# Patient Record
Sex: FEMALE | Race: WHITE | Employment: OTHER | URBAN - METROPOLITAN AREA
[De-identification: names, ages, dates, MRNs, and addresses within clinical notes are randomized per-mention and may not be internally consistent; named-entity substitution may affect disease eponyms.]

---

## 2022-12-30 ENCOUNTER — PROCEDURE VISIT (OUTPATIENT)
Dept: FAMILY MEDICINE CLINIC | Age: 67
End: 2022-12-30

## 2022-12-30 VITALS
HEART RATE: 94 BPM | WEIGHT: 194.6 LBS | BODY MASS INDEX: 36.74 KG/M2 | DIASTOLIC BLOOD PRESSURE: 82 MMHG | SYSTOLIC BLOOD PRESSURE: 134 MMHG | HEIGHT: 61 IN | OXYGEN SATURATION: 91 % | RESPIRATION RATE: 16 BRPM

## 2022-12-30 DIAGNOSIS — L98.9 SKIN LESION: Primary | ICD-10-CM

## 2022-12-30 RX ORDER — OMEPRAZOLE 40 MG/1
CAPSULE, DELAYED RELEASE ORAL
COMMUNITY

## 2022-12-30 RX ORDER — LOSARTAN POTASSIUM AND HYDROCHLOROTHIAZIDE 12.5; 1 MG/1; MG/1
TABLET ORAL
COMMUNITY

## 2022-12-30 RX ORDER — ATORVASTATIN CALCIUM 40 MG/1
TABLET, FILM COATED ORAL
COMMUNITY

## 2022-12-30 RX ORDER — LEVOTHYROXINE AND LIOTHYRONINE 38; 9 UG/1; UG/1
60 TABLET ORAL DAILY
COMMUNITY

## 2022-12-30 RX ORDER — LEVOTHYROXINE AND LIOTHYRONINE 19; 4.5 UG/1; UG/1
TABLET ORAL
COMMUNITY
Start: 2017-07-26

## 2022-12-30 ASSESSMENT — ENCOUNTER SYMPTOMS
RECTAL PAIN: 0
COUGH: 0
COLOR CHANGE: 1
ABDOMINAL PAIN: 1
ABDOMINAL DISTENTION: 0
VOMITING: 0
FACIAL SWELLING: 0

## 2022-12-30 ASSESSMENT — PATIENT HEALTH QUESTIONNAIRE - PHQ9
SUM OF ALL RESPONSES TO PHQ9 QUESTIONS 1 & 2: 0
6. FEELING BAD ABOUT YOURSELF - OR THAT YOU ARE A FAILURE OR HAVE LET YOURSELF OR YOUR FAMILY DOWN: 0
7. TROUBLE CONCENTRATING ON THINGS, SUCH AS READING THE NEWSPAPER OR WATCHING TELEVISION: 0
SUM OF ALL RESPONSES TO PHQ QUESTIONS 1-9: 0
2. FEELING DOWN, DEPRESSED OR HOPELESS: 0
10. IF YOU CHECKED OFF ANY PROBLEMS, HOW DIFFICULT HAVE THESE PROBLEMS MADE IT FOR YOU TO DO YOUR WORK, TAKE CARE OF THINGS AT HOME, OR GET ALONG WITH OTHER PEOPLE: 0
SUM OF ALL RESPONSES TO PHQ QUESTIONS 1-9: 0
1. LITTLE INTEREST OR PLEASURE IN DOING THINGS: 0
3. TROUBLE FALLING OR STAYING ASLEEP: 0
4. FEELING TIRED OR HAVING LITTLE ENERGY: 0
SUM OF ALL RESPONSES TO PHQ QUESTIONS 1-9: 0
8. MOVING OR SPEAKING SO SLOWLY THAT OTHER PEOPLE COULD HAVE NOTICED. OR THE OPPOSITE, BEING SO FIGETY OR RESTLESS THAT YOU HAVE BEEN MOVING AROUND A LOT MORE THAN USUAL: 0
9. THOUGHTS THAT YOU WOULD BE BETTER OFF DEAD, OR OF HURTING YOURSELF: 0
5. POOR APPETITE OR OVEREATING: 0
SUM OF ALL RESPONSES TO PHQ QUESTIONS 1-9: 0

## 2022-12-30 NOTE — PROGRESS NOTES
12/30/2022    This is a 79 y.o. female who presents for  Chief Complaint   Patient presents with    Procedure     Pt is here today for a skin biopsy        HPI:     Patient is here presents to the office as a new patient for an abnormal skin lesion. She states that she noticed a skin lesion on her top of her head that is changed in shape and color over the last few weeks. No past medical history on file. No past surgical history on file. Social History     Socioeconomic History    Marital status:      Spouse name: Not on file    Number of children: Not on file    Years of education: Not on file    Highest education level: Not on file   Occupational History    Not on file   Tobacco Use    Smoking status: Never    Smokeless tobacco: Never   Vaping Use    Vaping Use: Never used   Substance and Sexual Activity    Alcohol use: Not Currently    Drug use: Never    Sexual activity: Not on file   Other Topics Concern    Not on file   Social History Narrative    Not on file     Social Determinants of Health     Financial Resource Strain: Not on file   Food Insecurity: Not on file   Transportation Needs: Not on file   Physical Activity: Not on file   Stress: Not on file   Social Connections: Not on file   Intimate Partner Violence: Not on file   Housing Stability: Not on file       No family history on file.     Current Outpatient Medications   Medication Sig Dispense Refill    atorvastatin (LIPITOR) 40 MG tablet atorvastatin 40 mg tablet      thyroid (ARMOUR) 30 MG tablet Take one tablet on Monday, Weednesday, and Friday      losartan-hydroCHLOROthiazide (HYZAAR) 100-12.5 MG per tablet losartan 100 mg-hydrochlorothiazide 12.5 mg tablet      omeprazole (PRILOSEC) 40 MG delayed release capsule omeprazole 40 mg capsule,delayed release      thyroid (ARMOUR) 60 MG tablet Take 60 mg by mouth daily 1 tablet on Sunday, Tuesday, Thursday and Saturday      metoprolol tartrate (LOPRESSOR) 25 MG tablet Take 25 mg by mouth 2 times daily       No current facility-administered medications for this visit. There is no immunization history on file for this patient. Allergies   Allergen Reactions    Hydrocodone     Propoxyphene     Penicillin G Rash       Review of Systems   Constitutional:  Negative for activity change, appetite change, chills, diaphoresis, fatigue and fever. HENT:  Negative for facial swelling. Respiratory:  Negative for cough. Cardiovascular:  Negative for chest pain and leg swelling. Gastrointestinal:  Positive for abdominal pain. Negative for abdominal distention, rectal pain and vomiting. Endocrine: Negative for cold intolerance, heat intolerance, polydipsia, polyphagia and polyuria. Genitourinary:  Negative for difficulty urinating and dyspareunia. Skin:  Positive for color change. Allergic/Immunologic: Negative for environmental allergies and food allergies. Neurological:  Negative for dizziness, facial asymmetry and light-headedness. Psychiatric/Behavioral:  Negative for behavioral problems and confusion. /82 (Site: Right Upper Arm, Position: Sitting)   Pulse 94   Resp 16   Ht 5' 1\" (1.549 m)   Wt 194 lb 9.6 oz (88.3 kg)   SpO2 91%   BMI 36.77 kg/m²     Physical Exam  Constitutional:       General: She is not in acute distress. Appearance: Normal appearance. HENT:      Right Ear: Tympanic membrane normal.      Left Ear: Tympanic membrane normal.   Eyes:      Pupils: Pupils are equal, round, and reactive to light. Cardiovascular:      Rate and Rhythm: Normal rate and regular rhythm. Pulmonary:      Effort: Pulmonary effort is normal.      Breath sounds: Normal breath sounds. Musculoskeletal:         General: Normal range of motion. Skin:     General: Skin is warm. Comments: 2 cm x 2 cm area round somewhat irregular lesion patient reports that is changed in shape and color. Neurological:      General: No focal deficit present. Mental Status: She is alert. Psychiatric:         Mood and Affect: Mood normal.         Behavior: Behavior normal.         Judgment: Judgment normal.       Shave Biopsy Procedure Note    Pre-operative Diagnosis: Actinic keratosis    Post-operative Diagnosis: same    Locations:middle  top of the scalp    Indications: growth, color change, inflammation, irritation, bleeding    Anesthesia: Lidocaine 1% without epinephrine     Procedure Details   History of allergy to iodine: no  History of allergy to lidocaine: no    Patient informed of the risks (including bleeding and infection) and benefits of the   procedure and Verbal informed consent obtained. The lesion and surrounding area were given a sterile prep using alcohol and draped in the usual sterile fashion. A razor was used to shave an area of skin approximately 1cm by 1cm. Hemostasis achieved with pressure . Antibiotic ointment and a sterile dressing applied. The specimen was sent for pathologic examination. The patient tolerated the procedure well. Findings:  Per above    Condition:  Stable    Complications:  none. Plan:  1. Instructed to keep the wound dry and covered for 24-48h and clean thereafter. 2. Warning signs of infection were reviewed. 3. Recommended that the patient use OTC analgesics as needed for pain. Wound care discussed in detail. Keep area dry for 24-48 hours. Cleanse wound BID with warm water and unfragranced soap. Apply topical triple antibiotics and clean dressing with every wound cleaning. Can leave open to the air once area is covered with a scab. Can apply Vaseline nightly to assist with the healing process. While assessing care for this patient, I have reviewed all pertinent lab work/imaging/ specialist notes and care in reference to those problems addressed above in detail. Appropriate medical decision making was based on this.      Please note that portions of this note may have been completed with a voice recognition program. Efforts were made to edit the dictations but occasionally words are mis-transcribed. No follow-ups on file.

## 2023-02-13 ENCOUNTER — HOSPITAL ENCOUNTER (INPATIENT)
Age: 68
LOS: 9 days | Discharge: HOME HEALTH | DRG: 987 | End: 2023-02-22
Attending: EMERGENCY MEDICINE | Admitting: INTERNAL MEDICINE
Payer: COMMERCIAL

## 2023-02-13 ENCOUNTER — OFFICE VISIT (OUTPATIENT)
Dept: FAMILY MEDICINE CLINIC | Age: 68
End: 2023-02-13

## 2023-02-13 ENCOUNTER — APPOINTMENT (OUTPATIENT)
Dept: GENERAL RADIOLOGY | Age: 68
DRG: 987 | End: 2023-02-13
Payer: COMMERCIAL

## 2023-02-13 ENCOUNTER — APPOINTMENT (OUTPATIENT)
Dept: VASCULAR LAB | Age: 68
DRG: 987 | End: 2023-02-13
Payer: COMMERCIAL

## 2023-02-13 ENCOUNTER — APPOINTMENT (OUTPATIENT)
Dept: CT IMAGING | Age: 68
DRG: 987 | End: 2023-02-13
Payer: COMMERCIAL

## 2023-02-13 VITALS
BODY MASS INDEX: 36.21 KG/M2 | SYSTOLIC BLOOD PRESSURE: 100 MMHG | DIASTOLIC BLOOD PRESSURE: 70 MMHG | HEART RATE: 115 BPM | HEIGHT: 61 IN | RESPIRATION RATE: 16 BRPM | OXYGEN SATURATION: 88 % | WEIGHT: 191.8 LBS

## 2023-02-13 DIAGNOSIS — R79.89 ELEVATED BRAIN NATRIURETIC PEPTIDE (BNP) LEVEL: ICD-10-CM

## 2023-02-13 DIAGNOSIS — I31.39 PERICARDIAL EFFUSION: ICD-10-CM

## 2023-02-13 DIAGNOSIS — I26.99 BILATERAL PULMONARY EMBOLISM (HCC): Primary | ICD-10-CM

## 2023-02-13 DIAGNOSIS — R91.8 LUNG MASS: ICD-10-CM

## 2023-02-13 DIAGNOSIS — R06.02 SHORTNESS OF BREATH: ICD-10-CM

## 2023-02-13 DIAGNOSIS — K76.9 LIVER LESION: ICD-10-CM

## 2023-02-13 DIAGNOSIS — R09.02 HYPOXIA: ICD-10-CM

## 2023-02-13 DIAGNOSIS — J96.01 ACUTE RESPIRATORY FAILURE WITH HYPOXIA (HCC): Primary | ICD-10-CM

## 2023-02-13 DIAGNOSIS — U07.1 COVID: ICD-10-CM

## 2023-02-13 DIAGNOSIS — J90 PLEURAL EFFUSION: ICD-10-CM

## 2023-02-13 DIAGNOSIS — I82.4Y2 ACUTE DEEP VEIN THROMBOSIS (DVT) OF PROXIMAL VEIN OF LEFT LOWER EXTREMITY (HCC): ICD-10-CM

## 2023-02-13 DIAGNOSIS — R77.8 ELEVATED TROPONIN: ICD-10-CM

## 2023-02-13 PROBLEM — J96.00 ACUTE RESPIRATORY FAILURE (HCC): Status: ACTIVE | Noted: 2023-02-13

## 2023-02-13 LAB
A/G RATIO: 1.3 (ref 1.1–2.2)
ALBUMIN SERPL-MCNC: 4 G/DL (ref 3.4–5)
ALP BLD-CCNC: 351 U/L (ref 40–129)
ALT SERPL-CCNC: 21 U/L (ref 10–40)
ANION GAP SERPL CALCULATED.3IONS-SCNC: 16 MMOL/L (ref 3–16)
ANTI-XA UNFRAC HEPARIN: 0.5 IU/ML (ref 0.3–0.7)
APTT: 26.5 SEC (ref 23–34.3)
AST SERPL-CCNC: 34 U/L (ref 15–37)
BASE EXCESS VENOUS: 1.2 MMOL/L (ref -3–3)
BASOPHILS ABSOLUTE: 0 K/UL (ref 0–0.2)
BASOPHILS RELATIVE PERCENT: 0.2 %
BILIRUB SERPL-MCNC: 0.9 MG/DL (ref 0–1)
BUN BLDV-MCNC: 22 MG/DL (ref 7–20)
CALCIUM SERPL-MCNC: 10.4 MG/DL (ref 8.3–10.6)
CARBOXYHEMOGLOBIN: 1.5 % (ref 0–1.5)
CHLORIDE BLD-SCNC: 101 MMOL/L (ref 99–110)
CO2: 24 MMOL/L (ref 21–32)
CREAT SERPL-MCNC: 0.8 MG/DL (ref 0.6–1.2)
EKG ATRIAL RATE: 106 BPM
EKG DIAGNOSIS: NORMAL
EKG P AXIS: 61 DEGREES
EKG P-R INTERVAL: 146 MS
EKG Q-T INTERVAL: 344 MS
EKG QRS DURATION: 86 MS
EKG QTC CALCULATION (BAZETT): 456 MS
EKG R AXIS: 55 DEGREES
EKG T AXIS: 58 DEGREES
EKG VENTRICULAR RATE: 106 BPM
EOSINOPHILS ABSOLUTE: 0 K/UL (ref 0–0.6)
EOSINOPHILS RELATIVE PERCENT: 0.1 %
GFR SERPL CREATININE-BSD FRML MDRD: >60 ML/MIN/{1.73_M2}
GLUCOSE BLD-MCNC: 131 MG/DL (ref 70–99)
HCO3 VENOUS: 25.1 MMOL/L (ref 23–29)
HCT VFR BLD CALC: 39.7 % (ref 36–48)
HEMOGLOBIN: 12.9 G/DL (ref 12–16)
INFLUENZA A: NOT DETECTED
INFLUENZA B: NOT DETECTED
LACTIC ACID: 3.7 MMOL/L (ref 0.4–2)
LYMPHOCYTES ABSOLUTE: 0.8 K/UL (ref 1–5.1)
LYMPHOCYTES RELATIVE PERCENT: 4.7 %
MCH RBC QN AUTO: 27.5 PG (ref 26–34)
MCHC RBC AUTO-ENTMCNC: 32.4 G/DL (ref 31–36)
MCV RBC AUTO: 84.6 FL (ref 80–100)
METHEMOGLOBIN VENOUS: 0.4 %
MONOCYTES ABSOLUTE: 0.6 K/UL (ref 0–1.3)
MONOCYTES RELATIVE PERCENT: 3.2 %
NEUTROPHILS ABSOLUTE: 16.4 K/UL (ref 1.7–7.7)
NEUTROPHILS RELATIVE PERCENT: 91.8 %
O2 SAT, VEN: 76 %
O2 THERAPY: ABNORMAL
PCO2, VEN: 37.7 MMHG (ref 40–50)
PDW BLD-RTO: 17.3 % (ref 12.4–15.4)
PH VENOUS: 7.44 (ref 7.35–7.45)
PLATELET # BLD: 133 K/UL (ref 135–450)
PMV BLD AUTO: 8.9 FL (ref 5–10.5)
PO2, VEN: 41.3 MMHG (ref 25–40)
POTASSIUM SERPL-SCNC: 3.3 MMOL/L (ref 3.5–5.1)
PRO-BNP: 2645 PG/ML (ref 0–124)
RBC # BLD: 4.69 M/UL (ref 4–5.2)
SARS-COV-2 RNA, RT PCR: DETECTED
SODIUM BLD-SCNC: 141 MMOL/L (ref 136–145)
SPECIMEN STATUS: NORMAL
SPECIMEN STATUS: NORMAL
TCO2 CALC VENOUS: 26 MMOL/L
TOTAL PROTEIN: 7.2 G/DL (ref 6.4–8.2)
TROPONIN: 0.05 NG/ML
WBC # BLD: 17.8 K/UL (ref 4–11)

## 2023-02-13 PROCEDURE — 2580000003 HC RX 258: Performed by: INTERNAL MEDICINE

## 2023-02-13 PROCEDURE — 6360000004 HC RX CONTRAST MEDICATION: Performed by: PHYSICIAN ASSISTANT

## 2023-02-13 PROCEDURE — 71260 CT THORAX DX C+: CPT | Performed by: PHYSICIAN ASSISTANT

## 2023-02-13 PROCEDURE — 96374 THER/PROPH/DIAG INJ IV PUSH: CPT

## 2023-02-13 PROCEDURE — 93971 EXTREMITY STUDY: CPT

## 2023-02-13 PROCEDURE — 80053 COMPREHEN METABOLIC PANEL: CPT

## 2023-02-13 PROCEDURE — 2580000003 HC RX 258: Performed by: PHYSICIAN ASSISTANT

## 2023-02-13 PROCEDURE — 2500000003 HC RX 250 WO HCPCS: Performed by: PHYSICIAN ASSISTANT

## 2023-02-13 PROCEDURE — 71045 X-RAY EXAM CHEST 1 VIEW: CPT

## 2023-02-13 PROCEDURE — 83605 ASSAY OF LACTIC ACID: CPT

## 2023-02-13 PROCEDURE — 82803 BLOOD GASES ANY COMBINATION: CPT

## 2023-02-13 PROCEDURE — 6360000002 HC RX W HCPCS: Performed by: PHYSICIAN ASSISTANT

## 2023-02-13 PROCEDURE — 94761 N-INVAS EAR/PLS OXIMETRY MLT: CPT

## 2023-02-13 PROCEDURE — 84484 ASSAY OF TROPONIN QUANT: CPT

## 2023-02-13 PROCEDURE — 87040 BLOOD CULTURE FOR BACTERIA: CPT

## 2023-02-13 PROCEDURE — 85730 THROMBOPLASTIN TIME PARTIAL: CPT

## 2023-02-13 PROCEDURE — 1200000000 HC SEMI PRIVATE

## 2023-02-13 PROCEDURE — 6370000000 HC RX 637 (ALT 250 FOR IP): Performed by: PHYSICIAN ASSISTANT

## 2023-02-13 PROCEDURE — 85025 COMPLETE CBC W/AUTO DIFF WBC: CPT

## 2023-02-13 PROCEDURE — 85520 HEPARIN ASSAY: CPT

## 2023-02-13 PROCEDURE — 99285 EMERGENCY DEPT VISIT HI MDM: CPT

## 2023-02-13 PROCEDURE — 93010 ELECTROCARDIOGRAM REPORT: CPT | Performed by: INTERNAL MEDICINE

## 2023-02-13 PROCEDURE — 6370000000 HC RX 637 (ALT 250 FOR IP): Performed by: INTERNAL MEDICINE

## 2023-02-13 PROCEDURE — 83880 ASSAY OF NATRIURETIC PEPTIDE: CPT

## 2023-02-13 PROCEDURE — 87636 SARSCOV2 & INF A&B AMP PRB: CPT

## 2023-02-13 PROCEDURE — 2700000000 HC OXYGEN THERAPY PER DAY

## 2023-02-13 PROCEDURE — 93005 ELECTROCARDIOGRAM TRACING: CPT | Performed by: PHYSICIAN ASSISTANT

## 2023-02-13 RX ORDER — PANTOPRAZOLE SODIUM 40 MG/1
40 TABLET, DELAYED RELEASE ORAL EVERY MORNING
Status: DISCONTINUED | OUTPATIENT
Start: 2023-02-14 | End: 2023-02-14

## 2023-02-13 RX ORDER — ASPIRIN 81 MG/1
324 TABLET, CHEWABLE ORAL ONCE
Status: COMPLETED | OUTPATIENT
Start: 2023-02-13 | End: 2023-02-13

## 2023-02-13 RX ORDER — MELATONIN 5 MG
5 TABLET,CHEWABLE ORAL NIGHTLY
COMMUNITY

## 2023-02-13 RX ORDER — SODIUM CHLORIDE 9 MG/ML
INJECTION, SOLUTION INTRAVENOUS PRN
Status: DISCONTINUED | OUTPATIENT
Start: 2023-02-13 | End: 2023-02-22 | Stop reason: HOSPADM

## 2023-02-13 RX ORDER — SODIUM CHLORIDE 0.9 % (FLUSH) 0.9 %
5-40 SYRINGE (ML) INJECTION EVERY 12 HOURS SCHEDULED
Status: DISCONTINUED | OUTPATIENT
Start: 2023-02-13 | End: 2023-02-22 | Stop reason: HOSPADM

## 2023-02-13 RX ORDER — IPRATROPIUM BROMIDE AND ALBUTEROL SULFATE 2.5; .5 MG/3ML; MG/3ML
1 SOLUTION RESPIRATORY (INHALATION) EVERY 4 HOURS PRN
Status: DISCONTINUED | OUTPATIENT
Start: 2023-02-13 | End: 2023-02-22 | Stop reason: HOSPADM

## 2023-02-13 RX ORDER — ATORVASTATIN CALCIUM 40 MG/1
40 TABLET, FILM COATED ORAL NIGHTLY
Status: DISCONTINUED | OUTPATIENT
Start: 2023-02-13 | End: 2023-02-22 | Stop reason: HOSPADM

## 2023-02-13 RX ORDER — SODIUM CHLORIDE 9 MG/ML
1000 INJECTION, SOLUTION INTRAVENOUS CONTINUOUS
Status: DISCONTINUED | OUTPATIENT
Start: 2023-02-13 | End: 2023-02-22

## 2023-02-13 RX ORDER — ACETAMINOPHEN 650 MG/1
650 SUPPOSITORY RECTAL EVERY 6 HOURS PRN
Status: DISCONTINUED | OUTPATIENT
Start: 2023-02-13 | End: 2023-02-22 | Stop reason: HOSPADM

## 2023-02-13 RX ORDER — ACETAMINOPHEN 325 MG/1
650 TABLET ORAL EVERY 6 HOURS PRN
Status: DISCONTINUED | OUTPATIENT
Start: 2023-02-13 | End: 2023-02-22 | Stop reason: HOSPADM

## 2023-02-13 RX ORDER — POLYETHYLENE GLYCOL 3350 17 G/17G
17 POWDER, FOR SOLUTION ORAL DAILY PRN
Status: DISCONTINUED | OUTPATIENT
Start: 2023-02-13 | End: 2023-02-22 | Stop reason: HOSPADM

## 2023-02-13 RX ORDER — ONDANSETRON 2 MG/ML
4 INJECTION INTRAMUSCULAR; INTRAVENOUS EVERY 6 HOURS PRN
Status: DISCONTINUED | OUTPATIENT
Start: 2023-02-13 | End: 2023-02-22 | Stop reason: HOSPADM

## 2023-02-13 RX ORDER — LEVOTHYROXINE AND LIOTHYRONINE 19; 4.5 UG/1; UG/1
30 TABLET ORAL
Status: DISCONTINUED | OUTPATIENT
Start: 2023-02-15 | End: 2023-02-22 | Stop reason: HOSPADM

## 2023-02-13 RX ORDER — LOSARTAN POTASSIUM AND HYDROCHLOROTHIAZIDE 12.5; 1 MG/1; MG/1
0.5 TABLET ORAL 2 TIMES DAILY
Status: CANCELLED | OUTPATIENT
Start: 2023-02-14

## 2023-02-13 RX ORDER — LEVOTHYROXINE AND LIOTHYRONINE 19; 4.5 UG/1; UG/1
60 TABLET ORAL
Status: DISCONTINUED | OUTPATIENT
Start: 2023-02-14 | End: 2023-02-22 | Stop reason: HOSPADM

## 2023-02-13 RX ORDER — HEPARIN SODIUM 10000 [USP'U]/100ML
1380 INJECTION, SOLUTION INTRAVENOUS CONTINUOUS
Status: DISCONTINUED | OUTPATIENT
Start: 2023-02-13 | End: 2023-02-22

## 2023-02-13 RX ORDER — SODIUM CHLORIDE 0.9 % (FLUSH) 0.9 %
5-40 SYRINGE (ML) INJECTION PRN
Status: DISCONTINUED | OUTPATIENT
Start: 2023-02-13 | End: 2023-02-22 | Stop reason: HOSPADM

## 2023-02-13 RX ORDER — MAGNESIUM L-LACTATE 84 MG
TABLET, EXTENDED RELEASE ORAL NIGHTLY
COMMUNITY

## 2023-02-13 RX ORDER — HEPARIN SODIUM 1000 [USP'U]/ML
2500 INJECTION, SOLUTION INTRAVENOUS; SUBCUTANEOUS PRN
Status: DISCONTINUED | OUTPATIENT
Start: 2023-02-13 | End: 2023-02-22 | Stop reason: HOSPADM

## 2023-02-13 RX ORDER — HEPARIN SODIUM 1000 [USP'U]/ML
5000 INJECTION, SOLUTION INTRAVENOUS; SUBCUTANEOUS ONCE
Status: COMPLETED | OUTPATIENT
Start: 2023-02-13 | End: 2023-02-13

## 2023-02-13 RX ORDER — HEPARIN SODIUM 1000 [USP'U]/ML
5000 INJECTION, SOLUTION INTRAVENOUS; SUBCUTANEOUS PRN
Status: DISCONTINUED | OUTPATIENT
Start: 2023-02-13 | End: 2023-02-22 | Stop reason: HOSPADM

## 2023-02-13 RX ORDER — BIOTIN 1 MG
10000 TABLET ORAL NIGHTLY
COMMUNITY

## 2023-02-13 RX ORDER — ONDANSETRON 4 MG/1
4 TABLET, ORALLY DISINTEGRATING ORAL EVERY 8 HOURS PRN
Status: DISCONTINUED | OUTPATIENT
Start: 2023-02-13 | End: 2023-02-22 | Stop reason: HOSPADM

## 2023-02-13 RX ADMIN — HEPARIN SODIUM 5000 UNITS: 1000 INJECTION INTRAVENOUS; SUBCUTANEOUS at 13:03

## 2023-02-13 RX ADMIN — HEPARIN SODIUM 1130 UNITS/HR: 10000 INJECTION, SOLUTION INTRAVENOUS at 13:03

## 2023-02-13 RX ADMIN — SODIUM CHLORIDE, PRESERVATIVE FREE 10 ML: 5 INJECTION INTRAVENOUS at 22:18

## 2023-02-13 RX ADMIN — ATORVASTATIN CALCIUM 40 MG: 40 TABLET, FILM COATED ORAL at 22:18

## 2023-02-13 RX ADMIN — CEFTRIAXONE SODIUM 1000 MG: 1 INJECTION, POWDER, FOR SOLUTION INTRAMUSCULAR; INTRAVENOUS at 15:08

## 2023-02-13 RX ADMIN — ASPIRIN 81 MG 324 MG: 81 TABLET ORAL at 13:04

## 2023-02-13 RX ADMIN — AZITHROMYCIN MONOHYDRATE 500 MG: 500 INJECTION, POWDER, LYOPHILIZED, FOR SOLUTION INTRAVENOUS at 15:48

## 2023-02-13 RX ADMIN — SODIUM CHLORIDE 1000 ML: 9 INJECTION, SOLUTION INTRAVENOUS at 15:06

## 2023-02-13 RX ADMIN — IOPAMIDOL 75 ML: 755 INJECTION, SOLUTION INTRAVENOUS at 12:40

## 2023-02-13 ASSESSMENT — PATIENT HEALTH QUESTIONNAIRE - PHQ9
8. MOVING OR SPEAKING SO SLOWLY THAT OTHER PEOPLE COULD HAVE NOTICED. OR THE OPPOSITE, BEING SO FIGETY OR RESTLESS THAT YOU HAVE BEEN MOVING AROUND A LOT MORE THAN USUAL: 1
5. POOR APPETITE OR OVEREATING: 0
7. TROUBLE CONCENTRATING ON THINGS, SUCH AS READING THE NEWSPAPER OR WATCHING TELEVISION: 1
3. TROUBLE FALLING OR STAYING ASLEEP: 1
SUM OF ALL RESPONSES TO PHQ QUESTIONS 1-9: 10
SUM OF ALL RESPONSES TO PHQ QUESTIONS 1-9: 10
1. LITTLE INTEREST OR PLEASURE IN DOING THINGS: 2
4. FEELING TIRED OR HAVING LITTLE ENERGY: 3
6. FEELING BAD ABOUT YOURSELF - OR THAT YOU ARE A FAILURE OR HAVE LET YOURSELF OR YOUR FAMILY DOWN: 0
9. THOUGHTS THAT YOU WOULD BE BETTER OFF DEAD, OR OF HURTING YOURSELF: 0
SUM OF ALL RESPONSES TO PHQ QUESTIONS 1-9: 10
2. FEELING DOWN, DEPRESSED OR HOPELESS: 2
SUM OF ALL RESPONSES TO PHQ QUESTIONS 1-9: 10
10. IF YOU CHECKED OFF ANY PROBLEMS, HOW DIFFICULT HAVE THESE PROBLEMS MADE IT FOR YOU TO DO YOUR WORK, TAKE CARE OF THINGS AT HOME, OR GET ALONG WITH OTHER PEOPLE: 1
SUM OF ALL RESPONSES TO PHQ9 QUESTIONS 1 & 2: 4

## 2023-02-13 ASSESSMENT — COLUMBIA-SUICIDE SEVERITY RATING SCALE - C-SSRS
2. HAVE YOU ACTUALLY HAD ANY THOUGHTS OF KILLING YOURSELF?: NO
1. WITHIN THE PAST MONTH, HAVE YOU WISHED YOU WERE DEAD OR WISHED YOU COULD GO TO SLEEP AND NOT WAKE UP?: NO
6. HAVE YOU EVER DONE ANYTHING, STARTED TO DO ANYTHING, OR PREPARED TO DO ANYTHING TO END YOUR LIFE?: NO

## 2023-02-13 ASSESSMENT — ENCOUNTER SYMPTOMS
SHORTNESS OF BREATH: 1
COLOR CHANGE: 0
BLOOD IN STOOL: 0
WHEEZING: 0
DIARRHEA: 0
SORE THROAT: 0
COUGH: 1
CONSTIPATION: 0
ABDOMINAL PAIN: 0

## 2023-02-13 ASSESSMENT — PAIN - FUNCTIONAL ASSESSMENT: PAIN_FUNCTIONAL_ASSESSMENT: NONE - DENIES PAIN

## 2023-02-13 NOTE — CONSULTS
Pharmacy to Manage Heparin Infusion per Hospital Policy    Dx: (VTE/DVT/PE)  Pt wt = 63 kg (adjusted)  Baseline aPTT = pending    Heparin (weight-based) Infusion: VTE/DVT/PE  Heparin 80 units/kg IVP bolus (max 10,000 units) followed by Heparin infusion at 18 units/kg/hr (recommended max initial rate: 2100 units/hr). Recheck anti-Xa (unless aPTT being used) in 6 hours. Goal anti-Xa 0.3-0.7 IU/mL  Goal aPTT =  seconds. Pharmacy to manage Heparin - contact for questions. Heparin 80 units/kg IV x 1 (max 10,000 units), then 18 units/kg/hr (recommended max  initial rate 2100 units/hr). Adjust infusion rate based off anti-Xa results below. anti-Xa < 0.1  Heparin 80 units/kg bolus  Increase infusion by 4 units/kg/hr  anti-Xa 0.1-0.29    Heparin 40 units/kg bolus Increase infusion by 2 units/kg/hr  anti-Xa 0.3-0.7  No bolus No change   anti-Xa 0.71-0.8    No bolus Decrease infusion by 1 units/kg/hr  anti-Xa 0.81-0.99   No bolus Decrease infusion by 2 units/kg/hr  anti-Xa 1 or more  Hold heparin for 1 hour Decrease infusion by 3 units/kg/hr    Obtain anti-Xa 6 hours after bolus and 6 hours after any dose change until two consecutive therapeutic anti-Xa are achieved- then daily.     Alanna Coreas, PharmD 2/13/2023 12:36 PM      2/13 @ 1933  Anti-Xa - 0.5  Continue heparin infusion at 1130 units/hr  Next anti-xa in 6 hours  Jinny Manzo PharmD 2/13/2023 8:15 PM     ----------------------------------------------------  2/14 0227  High-Dose Heparin Drip  Current Rate: 1130 units/hr  2/14 @ 0136 Anti-Xa Level= 0.40  Plan: Per pharmacy dosing, we will not make any changes at this time and move to daily Anti-Xa level monitoring    Next Anti-Xa Level: 2/15 @ 0600  Beronica Barbosa PharmD 2/14/2023 2:27 AM    ----------------------------------------------------------  2/15 0657  High-Dose Heparin Drip  Current Rate: 1130 units/hr  2/15 @ 0601 Anti-Xa Level= 0.38  Plan: Per pharmacy dosing, we will not make any changes at this time    Next Anti-Xa Level: 2/16 @ 0600  Claude Bowling, PharmD 2/15/2023 6:57 AM    2/16/2023 at 9891  Anti-Xa Unfrac Heparin   0.48   IU/mL  - No change to Heparin at this time, continue Heparin infusion at _1130_ units/hr. - Recheck Anti-Xa daily, next lab 2/17. Javid Awad, PharmD    2/16/2023 8:53 AM      2/17/2023 at 0600  Anti-Xa Unfrac Heparin   0.50   IU/mL  - No change to Heparin at this time, continue Heparin infusion at _1130_ units/hr. - Recheck Anti-Xa daily, next lab 2/18. Javid Awad PharmD    2/17/2023 9:43 AM    2/18 0628  Anti-Xa - 0.55   Continue heparin infusion at 1130 units/hr  Next anti-Xa 2/19  Willard Mejia PharmD 2/18/2023 11:13 AM      2/19 @ 0629  Anti-Xa 0.47  No change to Heparin at this time, continue Heparin infusion at 1130 units/hr. Recheck anti-Xa tomorrow AM    Mary Ellen Diehl PharmD 2/19/2023 8:46 AM    ----------------------------------------------------    2/20/2023  anti-Xa level = < 0.10 IU/mL at 0644  Heparin was stopped at 0300 for liver biopsy  Requested RN to contact Rx when Heparin restarted  Will restart @ same rate of 1130 units/hr & anti-Xa 6 hours from restarting  Tisha Barnes PharmD  2/20/2023 7:57 AM      -------------------------------------------------------    2/20/2023  Heparin restarting now   Restart Heparin infusion rate at 1130 units/hr   Recheck level in 6 hours at 2100  TishaAbdirashid OharaLE  2/20/2023 3:08 PM    2/21 0448  Xa = 0.63  Continue current rate. Next Xa Klever Munoz Pharm D.2/21/2023 5:58 AM    Anti-Xa = 0.16 at 2052. Give 5000units bolus and increase drip to 1380units/hr. Next Anti-Xa at 0400 2/21  Misa Nichols, [address] [place] [address]  2/20/2023 at 9:30 PM  Toma MendenhallD, BCPS   2/21/2023 8:45 AM    --------------------------------------    2/21/2023  anti-Xa level = 0.19 IU/mL at 1027  Per RN notes, \"IV heparin site began leaking\"; which may account for subtherapeutic anti-Xa  Will hold bolus for now  Increase Heparin infusion rate to 1510 units/hr  Recheck level in 6 hours at [address], PharmD  2/21/2023 12:09 PM    2/21 1950  Anti-Xa - 0.89  Decrease heparin infusion to 1380 units/hr  Next anti-Xa at [employer], PharmD 2/21/2023 8:31 PM    2/22 0203  Anti-Xa - 0.52  Rate = 1380 units/hr  Next anti-Xa at 0800  [facility], Pharm D.2/22/2023 3:22 AM    2/22 @ 0816  Anti-Xa > 1.10   Hold Heparin for 1 hour then resume at 1190 units/hr.   Recheck anti-Xa in 6 hours @ Jimmy Gray PharmD 2/22/2023 9:34 AM

## 2023-02-13 NOTE — PROGRESS NOTES
Mathew Nathan (:  1955) is a 79 y.o. female,Established patient, here for evaluation of the following chief complaint(s):  Asthma (Has had shortness of breath since the middle of December, )         ASSESSMENT/PLAN:  1. Acute respiratory failure with hypoxia ([address]  -In office today patient does have some clear acute respiratory distress with some hypoxia. Oxygen was administered in office today. Patient's O2 lowest reading after exertion was in the low 80s. At rest without oxygen was at 86.  2 L of oxygen was administered in office today patient's oxygen after administration of oxygen was 96. Patient was still having labored breathing. Patient did have diminished and reduced lung sounds on the left side. At this time given patient's presentation it is recommended that she be seen in the ER for further evaluation. 2. Shortness of breath  -     EKG 12 lead; Future  -Patient did have some concerning findings on EKG. There were nondiagnostic but without an EKG to compare to his best the patient be seen in the ER to further evaluate reasons for shortness of breath. - Patient also did travel from [facility]) to [address] [place] [address] Dr hamilton. Patient is a likelihood of blood clot patient is at high risk for blood clots following her travel. Subjective   SUBJECTIVE/OBJECTIVE:  HPI  Patient presents the office today for ongoing shortness of breath. Patient endorses that she has not been feeling well since the end of December. Patient states that she has seen her normal PCP who prescribed her prednisone 5 mg for 30 days. She is also been using an inhaler. She often states that she is very short of breath with exertion things have worsened over the last 2 days. Ultimately her daughter picked her up from [place] she is currently staying. Patient is originally from [place]. Over the last few months patient has been on steroids inhalers as well as antibiotics with no relief of any of her symptoms.   Patient presents the office today feeling the worst that she has ever felt. She states that she cannot catch her breath even at rest.  Patient denies any fever COVID or flulike symptoms at this time. Patient does have a history of hypertension and thyroid disease. Patient has no other acute complaints at this time. Review of Systems   HENT:  Negative for congestion and sore throat. Respiratory:  Positive for cough and shortness of breath. Negative for wheezing. Cardiovascular:  Negative for chest pain and leg swelling. Gastrointestinal:  Negative for abdominal pain, blood in stool, constipation and diarrhea. Endocrine: Negative for cold intolerance and heat intolerance. Genitourinary:  Negative for difficulty urinating, hematuria and urgency. Musculoskeletal:  Negative for arthralgias and gait problem. Skin:  Negative for color change. Neurological:  Negative for dizziness, seizures, light-headedness and headaches. Psychiatric/Behavioral:  Negative for agitation and confusion. The patient is not nervous/anxious. Objective   Physical Exam  Vitals reviewed. Constitutional:       General: She is not in acute distress. Appearance: Normal appearance. She is obese. HENT:      Right Ear: Tympanic membrane normal.      Left Ear: Tympanic membrane normal.   Eyes:      Pupils: Pupils are equal, round, and reactive to light. Cardiovascular:      Rate and Rhythm: Normal rate and regular rhythm. Pulmonary:      Effort: Pulmonary effort is normal. Tachypnea present. Breath sounds: Decreased air movement present. Examination of the left-middle field reveals decreased breath sounds. Examination of the left-lower field reveals decreased breath sounds. Decreased breath sounds present. Abdominal:      General: Abdomen is flat. Bowel sounds are normal.      Palpations: Abdomen is soft. Musculoskeletal:         General: Normal range of motion. Skin:     General: Skin is warm. Neurological:      General: No focal deficit present. Mental Status: She is alert. Psychiatric:         Mood and Affect: Mood normal.         Behavior: Behavior normal.         Judgment: Judgment normal.          This note was generated completely or in part utilizing Dragon dictation speech recognition software. Occasionally, words are mistranscribed and despite editing, the text may contain inaccuracies due to incorrect word recognition. If further clarification is needed please contact the office at [phone]. An electronic signature was used to authenticate this note.     --YANN Velasquez - CNP

## 2023-02-13 NOTE — ED PROVIDER NOTES
[facility]  ED  Emergency Department Encounter    Patient Name: Jo Ann Santiago  MRN: [id number]  [place]: 1955  Date of Evaluation: 2/13/2023  Provider: YANN Moeller CNP  Note Started: 10:53 AM EST 2/13/23    CHIEF COMPLAINT  Shortness of Breath (Was sent by PCP for hypoxia. Sent on 2 L (was 88% on room air). Pt 86% room air on arrival. Hx asthma. Has felt worsening SOB since 1/15/2023.)    SHARED SERVICE VISIT  I have seen and evaluated this patient with my supervising physician, Dr. Anat Galvan. HISTORY OF PRESENT ILLNESS  Jo Ann Santiago is a 79 y.o. female who presents to the ED with progressively worsening cough and shortness of breath. Patient brought in by family for evaluation. Patient states that symptoms have been ongoing now for 1 month. Progressively worsening. She has developed cough. Nonproductive. No mopped assist.  Non-smoker. Is on diuretic. Reports 2 to 3 weeks ago had some bruising and swelling in the left leg without pain. Attributed to taking ibuprofen. Patient denies associated chest pain. No pain with deep breaths. Denies headaches or dizziness. No nasal congestion sore throat. No abdominal pain. No nausea or vomiting. No other complaints, modifying factors or associated symptoms. Nursing notes reviewed were all reviewed and agreed with or any disagreements were addressed in the HPI. PMH:  Past Medical History:   Diagnosis Date    Asthma      Surgical History:  History reviewed. No pertinent surgical history.   Family History:  Family History   Problem Relation Age of Onset    Cancer Paternal Grandmother     Cancer Paternal Grandfather      Social History:  Social History     Socioeconomic History    Marital status:      Spouse name: Not on file    Number of children: Not on file    Years of education: Not on file    Highest education level: Not on file   Occupational History    Not on file   Tobacco Use    Smoking status: Never    Smokeless tobacco: Never   Vaping Use    Vaping Use: Never used   Substance and Sexual Activity    Alcohol use: Not Currently    Drug use: Never    Sexual activity: Not on file   Other Topics Concern    Not on file   Social History Narrative    Not on file     Social Determinants of Health     Financial Resource Strain: Not on file   Food Insecurity: Not on file   Transportation Needs: Not on file   Physical Activity: Not on file   Stress: Not on file   Social Connections: Not on file   Intimate Partner Violence: Not on file   Housing Stability: Not on file     Current Medications:  Current Facility-Administered Medications   Medication Dose Route Frequency Provider Last Rate Last Admin    heparin (porcine) injection 5,000 Units  5,000 Units IntraVENous PRN FADIA Hickey        heparin (porcine) injection 2,500 Units  2,500 Units IntraVENous PRN FADIA Hickey        heparin 25,000 unit in sodium chloride 0.45% 250 mL (premix) infusion  1,130 Units/hr IntraVENous Continuous FADIA Hickey 11.3 mL/hr at 02/13/23 1303 1,130 Units/hr at 02/13/23 1303    0.9 % sodium chloride infusion  1,000 mL IntraVENous Continuous FADIA Hickey 100 mL/hr at 02/13/23 1506 1,000 mL at 02/13/23 1506     Current Outpatient Medications   Medication Sig Dispense Refill    atorvastatin (LIPITOR) 40 MG tablet atorvastatin 40 mg tablet      thyroid (ARMOUR) 30 MG tablet Take one tablet on Monday, Weednesday, and Friday      losartan-hydroCHLOROthiazide (HYZAAR) 100-12.5 MG per tablet losartan 100 mg-hydrochlorothiazide 12.5 mg tablet      omeprazole (PRILOSEC) 40 MG delayed release capsule omeprazole 40 mg capsule,delayed release      thyroid (ARMOUR) 60 MG tablet Take 60 mg by mouth daily 1 tablet on Sunday, Tuesday, Thursday and Saturday       Allergies:  Allergies   Allergen Reactions    Hydrocodone     Propoxyphene     Penicillin G Rash     Screenings:     [place] Coma Scale  Eye Opening: Spontaneous  Best Verbal  Response: Oriented  Best Motor Response: Obeys commands  [place] Coma Scale Score: 15           [facility] Assessment  BP: (!) 159/85  Heart Rate: (!) 115          REVIEW OF SYSTEMS  Positives and Pertinent Negatives as per HPI. PHYSICAL EXAM  BP (!) 159/85   Pulse (!) 115   Temp 98.6 °F (37 °C) (Oral)   Resp 24   SpO2 97%     GENERAL APPEARANCE: Awake and alert. Cooperative. No acute distress. HEAD: Normocephalic. Atraumatic. EYES:  EOM's grossly intact. ENT: Mucous membranes are moist.   NECK: Supple. No JVD. No tracheal tenderness or deviation. No crepitus. HEART: Tachycardic. No murmurs, rubs, or gallops. LUNGS: CTA B. No wheezing, rhonchi, rales. Mild conversational dyspnea. Respirations unlabored. Good air exchange. ABDOMEN: Soft. Non-distended. Non-tender. No midline pulsatile mass. EXTREMITIES: No peripheral edema. No unilateral calf pain, redness or swelling. No palpable cords or masses. Moves all extremities equally. All extremities neurovascularly intact. SKIN: Warm and dry. No acute rashes. NEUROLOGICAL: Alert and oriented. No gross facial drooping. Strength 5/5, sensation intact. EKG  When ordered, EKG's are interpreted by the ED Physician in the absence of a Cardiologist.  Please see their note for interpretation of EKG.     LABS  Labs Reviewed   COVID-19 & INFLUENZA COMBO - Abnormal; Notable for the following components:       Result Value    SARS-CoV-2 RNA, RT PCR DETECTED (*)     All other components within normal limits   CBC WITH AUTO DIFFERENTIAL - Abnormal; Notable for the following components:    WBC 17.8 (*)     RDW 17.3 (*)     Platelets 819 (*)     Neutrophils Absolute 16.4 (*)     Lymphocytes Absolute 0.8 (*)     All other components within normal limits   COMPREHENSIVE METABOLIC PANEL - Abnormal; Notable for the following components:    Potassium 3.3 (*)     Glucose 131 (*)     BUN 22 (*)     Alkaline Phosphatase 351 (*)     All other components within normal limits   TROPONIN - Abnormal; Notable for the following components:    Troponin 0.05 (*)     All other components within normal limits   BRAIN NATRIURETIC PEPTIDE - Abnormal; Notable for the following components:    Pro-BNP 2,645 (*)     All other components within normal limits   BLOOD GAS, VENOUS - Abnormal; Notable for the following components:    pCO2, Nader 37.7 (*)     pO2, Nader 41.3 (*)     All other components within normal limits   LACTIC ACID - Abnormal; Notable for the following components:    Lactic Acid 3.7 (*)     All other components within normal limits   CULTURE, BLOOD 2   CULTURE, BLOOD 1   SAMPLE POSSIBLE BLOOD BANK TESTING   APTT   SAMPLE POSSIBLE BLOOD BANK TESTING   ANTI-XA, UNFRACTIONATED HEPARIN     When ordered, only abnormal lab results are displayed. All other labs were within normal range or not returned as of this dictation. RADIOLOGY  Non-plain film images such as CT, U/S, and MRI are read by the radiologist.  Plain radiographic images are visualized and preliminarily interpreted by the ED Provider with the below findings:     Questionable abnormality in left upper lobe. Diffuse infiltrates bilaterally. Interpretation per the Radiologist below, if available at the time of this note:  CT CHEST PULMONARY EMBOLISM W CONTRAST   Final Result   1. Acute bilateral pulmonary embolism is confirmed. 2. No significant right ventricular strain. 3. 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 Pulmonary follow-up is recommended. 4. Mediastinal and bilateral hilar lymphadenopathy. 5. Multiple small pulmonary nodules bilaterally. Pulmonary metastasis cannot   be excluded. 6. Suspected malignant left pleural effusion. 7. Suspected hepatic metastasis. 8. Mosaic attenuation throughout the lungs likely in association with known   pulmonary emboli. 9. Small pericardial effusion noted incidentally.    Findings were discussed with the emergency room clinician at 1:15 pm on   2/13/2023. VL Extremity Venous Left   Final Result      XR CHEST PORTABLE   Final Result   1. Prominence of the left superior mediastinal border. It is unclear if   this represents a paramediastinal mass or lymphadenopathy. Recommend further   evaluation with a CT chest as there are no prior studies for comparison. 2.  Diffuse airspace infiltrates. These are indeterminate this could   represent pneumonia or edema. PROCEDURES  Unless otherwise noted below, none. ED COURSE/DDx/MDM  History obtained from:  None available    Vitals:  Vitals:    02/13/23 1622 02/13/23 1652 02/13/23 1753 02/13/23 1923   BP: (!) 159/84 (!) 146/71 (!) 149/80 (!) 159/85   Pulse: (!) 113 (!) 113 (!) 115 (!) 115   Resp: 17 19 13 24   Temp:       TempSrc:       SpO2: 94% 98% 96% 97%     Patient received following medications in ED:  Medications   heparin (porcine) injection 5,000 Units (has no administration in time range)   heparin (porcine) injection 2,500 Units (has no administration in time range)   heparin 25,000 unit in sodium chloride 0.45% 250 mL (premix) infusion (1,130 Units/hr IntraVENous New Bag 2/13/23 1303)   0.9 % sodium chloride infusion (1,000 mLs IntraVENous New Bag 2/13/23 1506)   aspirin chewable tablet 324 mg (324 mg Oral Given 2/13/23 1304)   iopamidol (ISOVUE-370) 76 % injection 75 mL (75 mLs IntraVENous Given 2/13/23 1240)   heparin (porcine) injection 5,000 Units (5,000 Units IntraVENous Given 2/13/23 1303)   azithromycin (ZITHROMAX) 500 mg in 250 mL addavial (0 mg IntraVENous Stopped 2/13/23 1648)   cefTRIAXone (ROCEPHIN) 1,000 mg in sodium chloride 0.9 % 50 mL IVPB (mini-bag) (0 mg IntraVENous Stopped 2/13/23 1538)     Sepsis Consideration:  Is this patient to be included in the SEP-1 Core Measure due to severe sepsis or septic shock? No   Exclusion criteria - the patient is NOT to be included for SEP-1 Core Measure due to:   Alternative explanation for abnormal labs/vitals that do not relate to sepsis, see MDM for further explanation    Records Reviewed:   Patient    Chronic conditions affecting care:   CHF. has a past medical history of Asthma. Social Determinants:   Patient is visiting from [address] [place]:   Case was discussed with hospitalist, Dr. June Balbuena, regarding admission for bilateral pulmonary emboli and concern for lung mass. Admission orders have been placed    Reassessment:      Patient remains without significant complaints on reevaluation. Tachycardia has been fairly persistent around 1 teens. Blood pressure stable. Patient placed on 2 L nasal cannula for her hypoxia with increased to mid and high 90s. MDM/Disposition Considerations:   Briefly Jennifer Goodrich presents for progressively worsening shortness of breath. Given left leg complaints did obtain ultrasound of the lower extremity consistent with multivessel blood clot. This discovered as patient was on her way to the CT scanner and high-dose heparin ordered. CT does confirm bilateral PEs without saddle emboli or significant heart strain. The left perihilar abnormality noted on chest x-ray suspected to be bronchogenic carcinoma. Bilateral pulmonary nodules and concern for hepatic metastasis also identified. CBC did show a leukocytosis at 17.8. Lactate 3.7. She was initiated on IV infusion. I did at this time avoid a 30 mL/kg IV fluid bolus given questionable pulmonary edema with elevated BNP. Her troponin was 0.05. Patient did test positive for COVID. Her VBG was unremarkable. Despite tachycardia which I feel is secondary to PEs as well as leukocytosis which may also be related to patient's recent COVID  diagnosis as well as concern for cancer while patient does meet criteria for sepsis there is no obvious source. She was initiated on azithromycin and Zosyn for potential postobstructive pneumonia. Blood pressure stable.   Given significant ED findings we are at this time recommending admission for further treatment and work-up. Admission orders placed. Critical Care Time:   60 Minutes of critical care time spent not including separately billable procedures. FINAL IMPRESSION  1. Bilateral pulmonary embolism ([address]    2. Acute deep vein thrombosis (DVT) of proximal vein of left lower extremity (HCC)    3. Lung mass    4. Liver lesion    5. Pericardial effusion    6. Pleural effusion    7. Elevated troponin    8. Elevated brain natriuretic peptide (BNP) level    9. COVID    10. Hypoxia      Based on emergency department evaluation do not feel that additional emergent treatment/work-up indicated at this time. Based on limitations of ongoing treatment and ruling out additional medical conditions from the emergency department, I do feel that admission indicated. Recommendations for admission have been discussed with Meghan Ribeiro and/or surrogate who are in agreement with plan at this time. DISPOSITION:  Patient was admitted to the hospital in stable condition. (Please note that portions of this note were completed with a voice recognition program.  Efforts were made to edit the dictations but occasionally words are mis-transcribed).          Chau Ortiz, [id number]  02/13/23 1956

## 2023-02-13 NOTE — H&P
Hospital Medicine History & Physical      PCP: YANN Rosen - CNP    Date of Admission: 2/13/2023    Date of Service: Pt seen/examined on 2/13/23 and Admitted to Inpatient with expected LOS greater than two midnights due to medical therapy. Chief Complaint:  SOB    History Of Present Illness:     79 y.o. female with hx of asthma who presented to [facility] with worsening sob. Pt recalls developing some sob/wheezing/chest tightness approx 1 month ago. Pt (originally from [place]) was doing [employer] work in [place] She recalls visiting family after [place] for approx 10 days and returned to [place] and recalls getting worse. She went to urgent care and given inhalers. She still worsened and went back to different urgent care and given prednisone(with sight improvement). Son-in-law called pt on 2/11 and noted her to be sob with talking and told pt to come back to [place] for evaluation.  -she saw PCP today and noted sats at 86% on RA. No f/chills and no weight loss/night sweats.    -She recalls some LLE bruising 1.5 weeks ago but 3 weeks ago noted some pain/swelling  -No cp  -she had some nasuea for past 3 days  -she denies viral symptoms (no myalgias/arthralgias/sore throat)    Past Medical History:          Diagnosis Date    Asthma        Past Surgical History:      History reviewed. No pertinent surgical history. Medications Prior to Admission:      Prior to Admission medications    Medication Sig Start Date End Date Taking?  Authorizing Provider   atorvastatin (LIPITOR) 40 MG tablet atorvastatin 40 mg tablet    Historical Provider, MD   thyroid (ARMOUR) 30 MG tablet Take one tablet on Monday, Weednesday, and Friday 7/26/17   Historical Provider, MD   losartan-hydroCHLOROthiazide (HYZAAR) 100-12.5 MG per tablet losartan 100 mg-hydrochlorothiazide 12.5 mg tablet    Historical Provider, MD   omeprazole (PRILOSEC) 40 MG delayed release capsule omeprazole 40 mg capsule,delayed release    Historical Provider, MD   thyroid (ARMOUR) 60 MG tablet Take 60 mg by mouth daily 1 tablet on Sunday, Tuesday, Thursday and Saturday    Historical Provider, MD       Allergies:  Hydrocodone, Propoxyphene, and Penicillin g    Social History:      The patient currently lives independently    TOBACCO:   reports that she has never smoked. She has never used smokeless tobacco.  ETOH:   reports that she does not currently use alcohol. E-cigarette/Vaping       Questions Responses    E-cigarette/Vaping Use Never User    Start Date     Passive Exposure     Quit Date     Counseling Given     Comments               Family History:       Reviewed and negative in regards to presenting illness/complaint. Problem Relation Age of Onset    Cancer Paternal Grandmother     Cancer Paternal Grandfather        REVIEW OF SYSTEMS COMPLETED:   Pertinent positives as noted in the HPI. All other systems reviewed and negative. PHYSICAL EXAM PERFORMED:    BP (!) 159/85   Pulse (!) 115   Temp 98.6 °F (37 °C) (Oral)   Resp 24   SpO2 97%     General appearance:  No apparent distress, appears stated age and cooperative. HEENT:  Normal cephalic, atraumatic without obvious deformity. Pupils equal, round, and reactive to light. Extra ocular muscles intact. Conjunctivae/corneas clear. Neck: Supple, with full range of motion. No jugular venous distention. Trachea midline. Respiratory:  Normal respiratory effort. Clear to auscultation, bilaterally without Rales/Wheezes/Rhonchi. Cardiovascular:  Regular rate and rhythm with normal S1/S2 without murmurs, rubs or gallops. Abdomen: Soft, non-tender, non-distended with normal bowel sounds. Musculoskeletal:  No clubbing, cyanosis or edema bilaterally. Full range of motion without deformity. Skin: Skin color, texture, turgor normal.  No rashes or lesions. Neurologic:  Neurovascularly intact without any focal sensory/motor deficits.  Cranial nerves: II-XII intact, grossly non-focal.  Psychiatric: Alert and oriented, thought content appropriate, normal insight  Capillary Refill: Brisk,3 seconds, normal  Peripheral Pulses: +2 palpable, equal bilaterally       Labs:     Recent Labs     02/13/23  1112   WBC 17.8*   HGB 12.9   HCT 39.7   *     Recent Labs     02/13/23  1112      K 3.3*      CO2 24   BUN 22*   CREATININE 0.8   CALCIUM 10.4     Recent Labs     02/13/23  1112   AST 34   ALT 21   BILITOT 0.9   ALKPHOS 351*     No results for input(s): INR in the last 72 hours. Recent Labs     02/13/23  1112   TROPONINI 0.05*       Urinalysis:    No results found for: Melvena Fridge, BACTERIA, RBCUA, BLOODU, [address], GLUCOSEU    Radiology:     EKG:  I have reviewed the EKG with the following interpretation: sinus tach, rate of 106, nl axis, no gross ischemic changes noted    CT CHEST PULMONARY EMBOLISM W CONTRAST   Final Result   1. Acute bilateral pulmonary embolism is confirmed. 2. No significant right ventricular strain. 3. 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 Pulmonary follow-up is recommended. 4. Mediastinal and bilateral hilar lymphadenopathy. 5. Multiple small pulmonary nodules bilaterally. Pulmonary metastasis cannot   be excluded. 6. Suspected malignant left pleural effusion. 7. Suspected hepatic metastasis. 8. Mosaic attenuation throughout the lungs likely in association with known   pulmonary emboli. 9. Small pericardial effusion noted incidentally. Findings were discussed with the emergency room clinician at 1:15 pm on   2/13/2023. VL Extremity Venous Left   Final Result      XR CHEST PORTABLE   Final Result   1. Prominence of the left superior mediastinal border. It is unclear if   this represents a paramediastinal mass or lymphadenopathy. Recommend further   evaluation with a CT chest as there are no prior studies for comparison. 2.  Diffuse airspace infiltrates.   These are indeterminate this could   represent pneumonia or edema. Consults:    IP CONSULT TO CARDIOLOGY  IP CONSULT TO PULMONOLOGY    ASSESSMENT:    Active Hospital Problems    Diagnosis Date Noted    Acute respiratory failure ([facility] [address] [J96.00] 02/13/2023     Priority: Medium         PLAN:    Acute hypoxic resp failure- likely multifactorial(pna, PE, ?new lung mass)  -tx underlying isuses  -supp ox, weaned as tolerated  -pulm consulted, apprec recs    Possible pna- ?related to COVID  -Iv rocephin/azithro continued  -precautions  -Bc pending  -Check procal    VTE- with new PE and DVT, POA  -heparin ggt started    Elevated BNP/troponin - ?related to above  -Asa given  -Echo  -Cards consulted  -trended angel    Htn-held hctz/arb for now    Hld- on statin    Thyroid- on armour home dosing    Gerd- on ppi      DVT Prophylaxis: heparin ggt  Diet: General, NPO mn  Code Status: FULL    PT/OT Eval Status: not ordered    Dispo - pending workup       Wander Noonan MD    Thank you YANN Acuna - MELANIA for the opportunity to be involved in this patient's care. If you have any questions or concerns please feel free to contact me at [phone].

## 2023-02-13 NOTE — ED PROVIDER NOTES
Emergency Department Attending Provider Note  Location: [facility]  ED  2/13/2023   Note Started: 12:08 PM EST 2/13/23       Patient Identification  Mercedes Lazo is a 79 y.o. female      Marisol Labs was evaluated in the Emergency Department for breath. Patient arrives into the emergency department tachycardic. Mild hypoxia noted. Symptoms have been progressive over the last couple of months. Patient reports dyspnea especially with exertion. No fevers, chills, or sweats noted. Bilateral lower extremity edema reported. Although initial history and physical exam information was obtained by GABY/NPP/MD/DO (who also dictated a record of this visit), I personally saw the patient and performed a substantive portion of the visit including all aspects of the medical decision making. PHYSICAL EXAM:  Heart: Tachycardic. Normal S1-S2. Lungs: Clear to auscultation bilaterally. No wheezes, rales, or rhonchi. Mild tachypnea noted. Abdomen: Soft. Extremities: 2+ bilateral lower extremity edema. EKG Interpretation    Sinus tachycardia with a rate of 106. Normal axis. Normal intervals and durations. No ST or T wave changes appreciated. No acute signs of ischemia. No previous EKG available for review. Patient seen and evaluated. Relevant records reviewed. MDM: Patient presents to the emergency department with shortness of breath over the last 2 months. Patient's labs are concerning for elevated troponin. CT reveals evidence of multiple pulmonary emboli as well as perihilar mass. Patient was started on heparin bolus and drip in the emergency department. Family/patient were informed and patient will be admitted for further evaluation and treatment. CLINICAL IMPRESSION  1. Bilateral pulmonary embolism ([address]    2. Acute deep vein thrombosis (DVT) of proximal vein of left lower extremity (HCC)    3. Lung mass    4. Liver lesion    5. Pericardial effusion    6.  Pleural effusion 7. Elevated troponin    8. Elevated brain natriuretic peptide (BNP) level    9. COVID    10. Hypoxia          I, Linda Francisco DO, am the primary clinician of record. I personally saw the patient and independently provided 35 minutes of non-concurrent critical care out of the total shared critical care time provided. This chart was generated in part by using Dragon Dictation system and may contain errors related to that system including errors in grammar, punctuation, and spelling, as well as words and phrases that may be inappropriate. If there are any questions or concerns please feel free to contact the dictating provider for clarification.      Shyla Ho DO   [employer]       Shyla Ho DO  02/13/23 [id number]

## 2023-02-14 PROBLEM — R77.8 TROPONIN LEVEL ELEVATED: Status: ACTIVE | Noted: 2023-02-14

## 2023-02-14 PROBLEM — I26.99 BILATERAL PULMONARY EMBOLISM (HCC): Status: ACTIVE | Noted: 2023-02-14

## 2023-02-14 PROBLEM — I82.409 DEEP VENOUS THROMBOSIS (HCC): Status: ACTIVE | Noted: 2023-02-14

## 2023-02-14 PROBLEM — I82.4Y2 ACUTE DEEP VEIN THROMBOSIS (DVT) OF PROXIMAL VEIN OF LEFT LOWER EXTREMITY (HCC): Status: ACTIVE | Noted: 2023-02-14

## 2023-02-14 PROBLEM — I31.39 PERICARDIAL EFFUSION: Status: ACTIVE | Noted: 2023-02-14

## 2023-02-14 PROBLEM — J90 PLEURAL EFFUSION: Status: ACTIVE | Noted: 2023-02-14

## 2023-02-14 LAB
ANION GAP SERPL CALCULATED.3IONS-SCNC: 12 MMOL/L (ref 3–16)
ANTI-XA UNFRAC HEPARIN: 0.4 IU/ML (ref 0.3–0.7)
BASOPHILS ABSOLUTE: 0.1 K/UL (ref 0–0.2)
BASOPHILS RELATIVE PERCENT: 0.4 %
BUN BLDV-MCNC: 20 MG/DL (ref 7–20)
CALCIUM SERPL-MCNC: 9.1 MG/DL (ref 8.3–10.6)
CHLORIDE BLD-SCNC: 106 MMOL/L (ref 99–110)
CO2: 23 MMOL/L (ref 21–32)
CREAT SERPL-MCNC: 0.7 MG/DL (ref 0.6–1.2)
EOSINOPHILS ABSOLUTE: 0.3 K/UL (ref 0–0.6)
EOSINOPHILS RELATIVE PERCENT: 1.5 %
GFR SERPL CREATININE-BSD FRML MDRD: >60 ML/MIN/{1.73_M2}
GLUCOSE BLD-MCNC: 97 MG/DL (ref 70–99)
HCT VFR BLD CALC: 34.1 % (ref 36–48)
HEMOGLOBIN: 11.2 G/DL (ref 12–16)
LV EF: 56 %
LVEF MODALITY: NORMAL
LYMPHOCYTES ABSOLUTE: 2.2 K/UL (ref 1–5.1)
LYMPHOCYTES RELATIVE PERCENT: 12.9 %
MAGNESIUM: 2.3 MG/DL (ref 1.8–2.4)
MCH RBC QN AUTO: 27.6 PG (ref 26–34)
MCHC RBC AUTO-ENTMCNC: 32.7 G/DL (ref 31–36)
MCV RBC AUTO: 84.2 FL (ref 80–100)
MONOCYTES ABSOLUTE: 1.1 K/UL (ref 0–1.3)
MONOCYTES RELATIVE PERCENT: 6.3 %
NEUTROPHILS ABSOLUTE: 13.6 K/UL (ref 1.7–7.7)
NEUTROPHILS RELATIVE PERCENT: 78.9 %
PDW BLD-RTO: 17.7 % (ref 12.4–15.4)
PLATELET # BLD: 150 K/UL (ref 135–450)
PMV BLD AUTO: 9.1 FL (ref 5–10.5)
POTASSIUM REFLEX MAGNESIUM: 3.4 MMOL/L (ref 3.5–5.1)
PROCALCITONIN: 0.03 NG/ML (ref 0–0.15)
RBC # BLD: 4.05 M/UL (ref 4–5.2)
SODIUM BLD-SCNC: 141 MMOL/L (ref 136–145)
TROPONIN: 0.07 NG/ML
TROPONIN: 0.08 NG/ML
TROPONIN: 0.08 NG/ML
WBC # BLD: 17.3 K/UL (ref 4–11)

## 2023-02-14 PROCEDURE — 2580000003 HC RX 258: Performed by: INTERNAL MEDICINE

## 2023-02-14 PROCEDURE — 99223 1ST HOSP IP/OBS HIGH 75: CPT | Performed by: INTERNAL MEDICINE

## 2023-02-14 PROCEDURE — 85025 COMPLETE CBC W/AUTO DIFF WBC: CPT

## 2023-02-14 PROCEDURE — 94761 N-INVAS EAR/PLS OXIMETRY MLT: CPT

## 2023-02-14 PROCEDURE — 2700000000 HC OXYGEN THERAPY PER DAY

## 2023-02-14 PROCEDURE — 93306 TTE W/DOPPLER COMPLETE: CPT

## 2023-02-14 PROCEDURE — 1200000000 HC SEMI PRIVATE

## 2023-02-14 PROCEDURE — 80048 BASIC METABOLIC PNL TOTAL CA: CPT

## 2023-02-14 PROCEDURE — 85520 HEPARIN ASSAY: CPT

## 2023-02-14 PROCEDURE — 2500000003 HC RX 250 WO HCPCS: Performed by: INTERNAL MEDICINE

## 2023-02-14 PROCEDURE — 6360000002 HC RX W HCPCS: Performed by: INTERNAL MEDICINE

## 2023-02-14 PROCEDURE — 84484 ASSAY OF TROPONIN QUANT: CPT

## 2023-02-14 PROCEDURE — 83735 ASSAY OF MAGNESIUM: CPT

## 2023-02-14 PROCEDURE — 6370000000 HC RX 637 (ALT 250 FOR IP): Performed by: NURSE PRACTITIONER

## 2023-02-14 PROCEDURE — 6370000000 HC RX 637 (ALT 250 FOR IP): Performed by: INTERNAL MEDICINE

## 2023-02-14 PROCEDURE — 84145 PROCALCITONIN (PCT): CPT

## 2023-02-14 PROCEDURE — 36415 COLL VENOUS BLD VENIPUNCTURE: CPT

## 2023-02-14 RX ORDER — PANTOPRAZOLE SODIUM 40 MG/1
40 TABLET, DELAYED RELEASE ORAL
Status: DISCONTINUED | OUTPATIENT
Start: 2023-02-14 | End: 2023-02-14 | Stop reason: SDUPTHER

## 2023-02-14 RX ORDER — HYDROCHLOROTHIAZIDE 12.5 MG/1
12.5 CAPSULE, GELATIN COATED ORAL DAILY
Status: DISCONTINUED | OUTPATIENT
Start: 2023-02-14 | End: 2023-02-22 | Stop reason: HOSPADM

## 2023-02-14 RX ORDER — ASPIRIN 81 MG/1
81 TABLET, CHEWABLE ORAL DAILY
Status: DISCONTINUED | OUTPATIENT
Start: 2023-02-14 | End: 2023-02-16

## 2023-02-14 RX ORDER — DIPHENHYDRAMINE HCL 25 MG
25 TABLET ORAL EVERY 6 HOURS PRN
Status: DISCONTINUED | OUTPATIENT
Start: 2023-02-14 | End: 2023-02-22 | Stop reason: HOSPADM

## 2023-02-14 RX ORDER — MECOBALAMIN 5000 MCG
5 TABLET,DISINTEGRATING ORAL NIGHTLY
Status: DISCONTINUED | OUTPATIENT
Start: 2023-02-14 | End: 2023-02-19

## 2023-02-14 RX ORDER — DEXAMETHASONE SODIUM PHOSPHATE 4 MG/ML
6 INJECTION, SOLUTION INTRA-ARTICULAR; INTRALESIONAL; INTRAMUSCULAR; INTRAVENOUS; SOFT TISSUE EVERY 24 HOURS
Status: DISCONTINUED | OUTPATIENT
Start: 2023-02-14 | End: 2023-02-18

## 2023-02-14 RX ORDER — PANTOPRAZOLE SODIUM 40 MG/1
40 TABLET, DELAYED RELEASE ORAL
Status: DISCONTINUED | OUTPATIENT
Start: 2023-02-14 | End: 2023-02-22 | Stop reason: HOSPADM

## 2023-02-14 RX ORDER — LOSARTAN POTASSIUM 25 MG/1
50 TABLET ORAL 2 TIMES DAILY
Status: DISCONTINUED | OUTPATIENT
Start: 2023-02-14 | End: 2023-02-22 | Stop reason: HOSPADM

## 2023-02-14 RX ADMIN — CEFTRIAXONE SODIUM 1000 MG: 1 INJECTION, POWDER, FOR SOLUTION INTRAMUSCULAR; INTRAVENOUS at 16:03

## 2023-02-14 RX ADMIN — DEXAMETHASONE SODIUM PHOSPHATE 6 MG: 4 INJECTION, SOLUTION INTRAMUSCULAR; INTRAVENOUS at 08:12

## 2023-02-14 RX ADMIN — THYROID, PORCINE 60 MG: 30 TABLET ORAL at 05:55

## 2023-02-14 RX ADMIN — AZITHROMYCIN MONOHYDRATE 250 MG: 500 INJECTION, POWDER, LYOPHILIZED, FOR SOLUTION INTRAVENOUS at 16:59

## 2023-02-14 RX ADMIN — ATORVASTATIN CALCIUM 40 MG: 40 TABLET, FILM COATED ORAL at 20:58

## 2023-02-14 RX ADMIN — SODIUM CHLORIDE 1000 ML: 9 INJECTION, SOLUTION INTRAVENOUS at 22:49

## 2023-02-14 RX ADMIN — HYDROCHLOROTHIAZIDE 12.5 MG: 12.5 CAPSULE ORAL at 08:12

## 2023-02-14 RX ADMIN — ACETAMINOPHEN 650 MG: 325 TABLET ORAL at 20:58

## 2023-02-14 RX ADMIN — LOSARTAN POTASSIUM 50 MG: 25 TABLET, FILM COATED ORAL at 20:58

## 2023-02-14 RX ADMIN — SODIUM CHLORIDE, PRESERVATIVE FREE 10 ML: 5 INJECTION INTRAVENOUS at 08:13

## 2023-02-14 RX ADMIN — LOSARTAN POTASSIUM 50 MG: 25 TABLET, FILM COATED ORAL at 08:12

## 2023-02-14 RX ADMIN — Medication 5 MG: at 22:49

## 2023-02-14 RX ADMIN — ASPIRIN 81 MG 81 MG: 81 TABLET ORAL at 10:36

## 2023-02-14 RX ADMIN — SODIUM CHLORIDE 1000 ML: 9 INJECTION, SOLUTION INTRAVENOUS at 10:31

## 2023-02-14 RX ADMIN — DIPHENHYDRAMINE HCL 25 MG: 25 TABLET ORAL at 22:49

## 2023-02-14 RX ADMIN — HEPARIN SODIUM 1130 UNITS/HR: 10000 INJECTION, SOLUTION INTRAVENOUS at 12:15

## 2023-02-14 RX ADMIN — PANTOPRAZOLE SODIUM 40 MG: 40 TABLET, DELAYED RELEASE ORAL at 05:55

## 2023-02-14 ASSESSMENT — PAIN SCALES - GENERAL: PAINLEVEL_OUTOF10: 3

## 2023-02-14 ASSESSMENT — PAIN DESCRIPTION - LOCATION: LOCATION: BACK;BUTTOCKS

## 2023-02-14 NOTE — PROGRESS NOTES
02/13/23 2207   RT Protocol   History Pulmonary Disease 1   Respiratory pattern 2   Breath sounds 0   Cough 0   Indications for Bronchodilator Therapy None   Bronchodilator Assessment Score 3

## 2023-02-14 NOTE — CARE COORDINATION
Case Management Assessment  Initial Evaluation    Date/Time of Evaluation: 2/14/2023 10:25 AM  Assessment Completed by: Álvaro Banda RN    If patient is discharged prior to next notation, then this note serves as note for discharge by case management. Patient Name: Yuliet Reason                   YOB: 1955  Diagnosis: Acute respiratory failure ([facility] [address] [J96.00]  Pericardial effusion [I31.39]  Lung mass [R91.8]  Pleural effusion [J90]  Hypoxia [R09.02]  Elevated troponin [R77.8]  Liver lesion [K76.9]  Bilateral pulmonary embolism (HCC) [I26.99]  Elevated brain natriuretic peptide (BNP) level [R79.89]  Acute deep vein thrombosis (DVT) of proximal vein of left lower extremity ([facility] [address] [I82.4Y2]  COVID [U07.1]                   Date / Time: 2/13/2023 10:44 AM    Patient Admission Status: Inpatient   Readmission Risk (Low < 19, Mod (19-27), High > 27): Readmission Risk Score: 13.1    Current PCP: YANN Daniels CNP  PCP verified by CM? Yes    Chart Reviewed: Yes      History Provided by: Patient  Patient Orientation: Alert and Oriented, Person, Place, Situation, Self    Patient Cognition: Alert    Hospitalization in the last 30 days (Readmission):  No    If yes, Readmission Assessment in CM Navigator will be completed. Advance Directives:      Code Status: Full Code   Patient's Primary Decision Maker is: Legal Next of Kin    Primary Decision MakeVernon Riley - Spouse - [phone]    Discharge Planning:    Patient lives with: Spouse/Significant Other, Family Members Type of Home: Apartment  Primary Care Giver: Self  Patient Support Systems include: Spouse/Significant Other, Family Members, Children   Current Financial resources: Medicare  Current community resources: None  Current services prior to admission: None            Current DME:              Type of Home Care services:  None    ADLS  Prior functional level: Independent in ADLs/IADLs  Current functional level:  Independent in ADLs/IADLs    PT AM-PAC:   /24  OT AM-PAC:   /24    Family can provide assistance at DC: Yes  Would you like Case Management to discuss the discharge plan with any other family members/significant others, and if so, who? Yes (Amadeo/)  Plans to Return to Present Housing: Yes  Other Identified Issues/Barriers to RETURNING to current housing: none  Potential Assistance needed at discharge: N/A            Potential DME: Oxygen Therapy (Comment) (currently on 5 liters oxygen)  Patient expects to discharge to: [address] [place] [address] for transportation at discharge:      Financial    Payor: Mrace Hill / Plan: Marce Hill [employer] / Product Type: *No Product type* /     Does insurance require precert for SNF: Yes    Potential assistance Purchasing Medications: Yes  [facility] [id number] Julio Hurtado, [address] [place] [address] [phone] - f [phone] [address] [facility] [address]  Phone: [phone] Fax: [phone]      Notes:    Factors facilitating achievement of predicted outcomes: Family support, Motivated, Cooperative, and Pleasant    Barriers to discharge: Medical complications    Additional Case Management Notes: Acute respiratory failure, COVID. Management per cardiology, pulmonology, HEMONC and IM. IV ABX, IV steroids. Pt from home with  and daughter. IPTA. Currently on 5 liters oxygen, none at baseline. Denies dcp needs. CM will follow for O2 needs.     The Plan for Transition of Care is related to the following treatment goals of Acute respiratory failure (HCC) [J96.00]  Pericardial effusion [I31.39]  Lung mass [R91.8]  Pleural effusion [J90]  Hypoxia [R09.02]  Elevated troponin [R77.8]  Liver lesion [K76.9]  Bilateral pulmonary embolism (HCC) [I26.99]  Elevated brain natriuretic peptide (BNP) level [R79.89]  Acute deep vein thrombosis (DVT) of proximal vein of left lower extremity ([address] [I82.4Y2]  COVID [N73.4]    IF APPLICABLE: The Patient and/or patient representative Vernon Mckeon and her family were provided with a choice of provider and agrees with the discharge plan. Freedom of choice list with basic dialogue that supports the patient's individualized plan of care/goals and shares the quality data associated with the providers was provided to:     Patient Representative Name:       The Patient and/or Patient Representative Agree with the Discharge Plan?       Maria M Parker RN  Case Management Department  Ph: [phone] Fax: [phone]

## 2023-02-14 NOTE — PROGRESS NOTES
Assessment complete and documented. HR elevated 111 bpm.  All other vital signs stable. Pt A&Ox4. Pt denies pain at this time. Safety precautions in place. Bed locked, alarmed, and in lowest position. Call light and beside table within reach. Droplet Plus isolation active. Will continue to monitor.

## 2023-02-14 NOTE — PROGRESS NOTES
TTE shows preserved LVEF. RV systolic function is reduced. There was flattening of the septum in systole, consistent with right ventricular pressure overload. There was inadequate tricuspid valve regurgitation present to estimate systolic pulmonary artery pressure. Pericardial effusion only appeared to be trivial to small in size. Given troponin elevation and reduced RV systolic function on echo, can ask vascular surgery to evaluate for possible EKOS.       Carlos Posada, [address]

## 2023-02-14 NOTE — DISCHARGE INSTRUCTIONS
FOLLOW-UP APPOINTMENTS    Follow-up appointment on 3/22/23 at 11:30 AM with Dr Johanne Hudson. [address]: [phone]. If you are unable to make this appointment, please call to reschedule [phone]. Please arrive 15 minutes early to complete necessary paperwork. Directions to [place] [address] towards [place]. [address] [facility] exit. Right off exit. Cross over [employer]. Right on [address]. Left into hospital. Follow the signs to the emergency room ( turn left toward the Emergency room). Go right at the first stop sign. Just past the Emergency room at the second stop sign turn right and go up the ramp and park on the top level if possible. Go in the glass doors of the [facility] on the top level of the garage [address]. As soon as you get in the door turn left and our office is the one with the glass doors. Heart Failure Resources:    Heart Failure Interactive Workbook:   Go to www.[employer].com/aha-heartfailure for a Free Heart Failure Interactive Workbook provided by [employer]. This interactive workbook will provide information on Healthier Living with Heart Failure. Please copy and paste link into search bar. Use your mouse to scroll through the pages. HF [place] dayanna:   Heart Failure Free smart phone dayanna available for iPhone and Android download. Use your phone to track sodium intake, fluid intake, symptoms, and weight. Low Sodium Diet:  Go to www. [employer]. org website for Lo[employer] which is Low Sodium! [employer] is a dialysis company, but this website offers free seasonal cookbooks. Each quarter, they will release 25-30 new recipes with a breakdown of calories, sodium, and glucose. Recipes:   Go to [email or web address] website for free recipes.

## 2023-02-14 NOTE — CONSULTS
[facility]    Consult Note      Requesting Physician:  Dr. Genny Rubin:  Lung mass      HISTORY OF PRESENT ILLNESS:    Ms. Radha Godoy  is a 79 y.o. female we are seeing in consultation for     ICD-10-CM    1. Bilateral pulmonary embolism ([address]  I26.99       2. Acute deep vein thrombosis (DVT) of proximal vein of left lower extremity (HCC)  I82.4Y2       3. Lung mass  R91.8       4. Liver lesion  K76.9       5. Pericardial effusion  I31.39       6. Pleural effusion  J90       7. Elevated troponin  R77.8       8. Elevated brain natriuretic peptide (BNP) level  R79.89       9. COVID  U07.1       10. Hypoxia  R09.02          This patient was admitted to [facility] on 2/13/2023 with hypoxic resp failure. She is on 5 LPM by NC. Past Medical History:  Past Medical History:   Diagnosis Date    Asthma        Past Surgical History:  History reviewed. No pertinent surgical history.     Current Medications:  Current Facility-Administered Medications   Medication Dose Route Frequency Provider Last Rate Last Admin    losartan (COZAAR) tablet 50 mg  50 mg Oral BID Ahsan Torres MD        And    hydroCHLOROthiazide (MICROZIDE) capsule 12.5 mg  12.5 mg Oral Daily Ahsan Torres MD        pantoprazole (PROTONIX) tablet 40 mg  40 mg Oral QAM AC YANN Joseph - CNP   40 mg at 02/14/23 0555    heparin (porcine) injection 5,000 Units  5,000 Units IntraVENous PRN Ahsan Torres MD        heparin (porcine) injection 2,500 Units  2,500 Units IntraVENous PRN Ahsan Torres MD        heparin 25,000 unit in sodium chloride 0.45% 250 mL (premix) infusion  1,130 Units/hr IntraVENous Continuous Ahsan Torres MD 11.3 mL/hr at 02/13/23 1303 1,130 Units/hr at 02/13/23 1303    0.9 % sodium chloride infusion  1,000 mL IntraVENous Continuous Ahsan Torres  mL/hr at 02/13/23 1506 1,000 mL at 02/13/23 1506    atorvastatin (LIPITOR) tablet 40 mg  40 mg Oral Nightly Carol Maurice MD   40 mg at 02/13/23 2218    [START ON 2/15/2023] thyroid (ARMOUR) tablet 30 mg  30 mg Oral Once per day on Mon Wed Fri Carol Maurice MD        thyroid [facility]) tablet 60 mg  60 mg Oral Once per day on Sun Tue Thu Sat Carol Maurice MD   60 mg at 02/14/23 0136    sodium chloride flush 0.9 % injection 5-40 mL  5-40 mL IntraVENous 2 times per day Carol Maurice MD   10 mL at 02/13/23 2218    sodium chloride flush 0.9 % injection 5-40 mL  5-40 mL IntraVENous PRN Carol Maurice MD        0.9 % sodium chloride infusion   IntraVENous PRN Carol Maurice MD        ondansetron (ZOFRAN-ODT) disintegrating tablet 4 mg  4 mg Oral Q8H PRN Carol Maurice MD        Or    ondansetron [facility]) injection 4 mg  4 mg IntraVENous Q6H PRN Carol Maurice MD        polyethylene glycol (GLYCOLAX) packet 17 g  17 g Oral Daily PRN Carol Maurice MD        acetaminophen (TYLENOL) tablet 650 mg  650 mg Oral Q6H PRN Carol Maurice MD        Or    acetaminophen (TYLENOL) suppository 650 mg  650 mg Rectal Q6H PRN Carol Maurice MD        cefTRIAXone (ROCEPHIN) 1,000 mg in sodium chloride 0.9 % 50 mL IVPB (mini-bag)  1,000 mg IntraVENous Q24H Carol Maurice MD        azithromycin (ZITHROMAX) 250 mg in sodium chloride 0.9 % 250 mL IVPB  250 mg IntraVENous Q24H Carol Maurice MD        perflutren lipid microspheres (DEFINITY) injection 1.5 mL  1.5 mL IntraVENous ONCE PRN Carol Maurice MD        ipratropium-albuterol (DUONEB) nebulizer solution 1 ampule  1 ampule Inhalation Q4H PRN Carol Maurice MD           Allergies:   Allergies   Allergen Reactions    Hydrocodone     Propoxyphene     Penicillin G Rash       Social History:  Social History     Socioeconomic History    Marital status:      Spouse name: Not on file    Number of children: Not on file    Years of education: Not on file Highest education level: Not on file   Occupational History    Not on file   Tobacco Use    Smoking status: Never    Smokeless tobacco: Never   Vaping Use    Vaping Use: Never used   Substance and Sexual Activity    Alcohol use: Not Currently    Drug use: Never    Sexual activity: Not on file   Other Topics Concern    Not on file   Social History Narrative    Not on file     Social Determinants of Health     Financial Resource Strain: Not on file   Food Insecurity: Not on file   Transportation Needs: Not on file   Physical Activity: Not on file   Stress: Not on file   Social Connections: Not on file   Intimate Partner Violence: Not on file   Housing Stability: Not on file          Family History:  Family History   Problem Relation Age of Onset    Cancer Paternal Grandmother     Cancer Paternal Grandfather        REVIEW OF SYSTEMS:      Constitutional: Denies fever, sweats, weight loss  Eyes: No visual changes or diplopia. No scleral icterus. Ent: No headaches, hearing loss or vertigo. No mouth sores or sore throat. Cardiovascular: No chest pain, dyspnea on exertion, palpitations or loss of consciousness. Respiratory: No cough or wheezing, no sputum production. No hemoptysis. Gastrointestinal: No abdominal pain, appetite loss, blood in stools. No change in bowel habits. Genitourinary: No dysuria, trouble voiding, or hematuria. Musculoskeletal: No generalized weakness. No joint complaints. Integumentary: No rash or pruritus. Neurological: No headache, diplopia. No change in gait, balance, or coordination. No paresthesias. Endocrine: No temperature intolerance. No excessive thirst, fluid intake, urination. Hematologic/lymphatic: No abnormal bruising or ecchymosis, blood clots or swollen lymph nodes. Allergic/immunologic: No nasal congestion or hives.         PHYSICAL EXAM:      Vitals:  Patient Vitals for the past 24 hrs:   BP Temp Temp src Pulse Resp SpO2 Height Weight   02/14/23 0520 -- -- -- -- -- -- 5' 1\" (1.549 m) 190 lb 1.6 oz (86.2 kg)   02/14/23 0400 (!) 153/97 98.3 °F (36.8 °C) Oral (!) 110 18 93 % -- --   02/14/23 0027 -- -- -- -- -- -- -- 190 lb 1.6 oz (86.2 kg)   02/13/23 2307 (!) 151/76 97.8 °F (36.6 °C) Oral (!) 111 20 91 % -- --   02/13/23 2215 (!) 152/89 97.8 °F (36.6 °C) Oral (!) 104 22 91 % -- --   02/13/23 1953 (!) 148/78 -- -- (!) 111 21 96 % -- --   02/13/23 1923 (!) 159/85 -- -- (!) 115 24 97 % -- --   02/13/23 1753 (!) 149/80 -- -- (!) 115 13 96 % -- --   02/13/23 1652 (!) 146/71 -- -- (!) 113 19 98 % -- --   02/13/23 1622 (!) 159/84 -- -- (!) 113 17 94 % -- --   02/13/23 1523 132/86 -- -- (!) 115 20 95 % -- --   02/13/23 1452 (!) 108/96 -- -- (!) 116 19 97 % -- --   02/13/23 1401 (!) 143/81 -- -- (!) 115 16 96 % -- --   02/13/23 1323 (!) 150/99 -- -- (!) 108 15 98 % -- --   02/13/23 1153 (!) 149/86 -- -- (!) 110 19 96 % -- --   02/13/23 1123 (!) 149/84 -- -- (!) 111 22 92 % -- --   02/13/23 1040 (!) 146/85 98.6 °F (37 °C) Oral (!) 115 22 93 % -- --       Date 02/14/23 0000 - 02/14/23 2359   Shift 8044-1711 [id number] 2723-4424 24 Hour Total   INTAKE   P.O.(mL/kg/hr) 480   480   Shift Total(mL/kg) 480(5.6)   480(5.6)   OUTPUT   Shift Total(mL/kg)       Weight (kg) 86.2 86.2 86.2 86.2       CONSTITUTIONAL: awake, alert, cooperative, no apparent distress   EYES: pupils equal, round and reactive to light, sclera clear and conjunctiva normal  ENT: Normocephalic, without obvious abnormality, atraumatic  NECK: supple, symmetrical, no jugular venous distension and no carotid bruits   HEMATOLOGIC/LYMPHATIC: no cervical, supraclavicular or axillary lymphadenopathy   LUNGS: no increased work of breathing and clear to auscultation   CARDIOVASCULAR: regular rate and rhythm, normal S1 and S2, no murmur noted  ABDOMEN: normal bowel sounds x 4, soft, non-distended, non-tender, no masses palpated, no hepatosplenomegaly   MUSCULOSKELETAL: full range of motion noted, tone is normal  NEUROLOGIC: awake, alert, oriented to name, place and time. Motor skills grossly intact.   SKIN: Normal skin color, texture, turgor and no jaundice. appears intact   EXTREMITIES: no LE edema       DATA:    PT/INR:    Recent Labs     02/13/23  1112   PROT 7.2     PTT:    Recent Labs     02/13/23  1305   APTT 26.5       CMP:    Recent Labs     02/14/23  0357 02/13/23  1112    141   K 3.4* 3.3*    101   CO2 23 24   BUN 20 22*   PROT  --  7.2     Mg:    Recent Labs     02/14/23  0357   MG 2.30       Lab Results   Component Value Date    CALCIUM 9.1 02/14/2023       CBC:    Recent Labs     02/14/23  0357 02/13/23  1112   WBC 17.3* 17.8*   NEUTROABS 13.6* 16.4*   LYMPHOPCT 12.9 4.7   RBC 4.05 4.69   HGB 11.2* 12.9   HCT 34.1* 39.7   MCV 84.2 84.6   MCH 27.6 27.5   MCHC 32.7 32.4   RDW 17.7* 17.3*    133*        LDH:No results for input(s): LDH in the last 720 hours.      Radiology Review:  VL Extremity Venous Left  Lower Extremities DVT Study     Demographics      Patient Name      YADY TRIPP      Date of Study     02/13/2023          Gender              Female      Patient Number    [id number]          Date of Birth       1955      Visit Number      [id number]           Age                 67 year(s)      Accession Number  [id number]          Room Number         07      Corporate ID      [id number]           Sonographer         Marie Frazier RVS      Ordering          Wilfred Ortiz PA  Interpreting        [facility]   Physician                             Physician           Readers                                                             Dave Vizcaino MD     Procedure    Type of Study:      Veins:Lower Extremities DVT Study, VL EXTREMITY VENOUS DUPLEX LEFT.      Vascular Sonographer Report     Indications for Study:Swelling and Leg pain.    Additional Indications:Patient scanned  bedside in ED with complaints of left  lower extremity swelling for 1 week with shortness of breath. Patient noted  pain behind knee over the past two days. Recent travel where patient was  sedentary for over 8 hours. Venous Duplex Scan: B-mode imaging of the deep and superficial veins, with  compression maneuvers, including color and Doppler spectral waveform analysis. Impressions  Right Impression  There is no evidence of deep venous thrombosis involving the right common  femoral vein. Left Impression  Acute totally occluding deep vein thrombosis involving the left femoral vein  at the distal thigh, popliteal vein, gastrocnemius veins (4 of 4), posterior  tibial, peroneal and soleal veins. No other evidence of deep vein or superficial vein thrombosis involving the  left lower extremity. There is no previous exam for comparison. Conclusions      Summary      Extensive acute deep vein thrombosis involving the left femoral vein,   popliteal vein, gastrocnemius veins, posterior tibial, peroneal and soleal   veins. Signature      ------------------------------------------------------------------   Electronically signed by Wili Pena MD (Interpreting   physician) on 02/13/2023 at 04:27 PM   ------------------------------------------------------------------     Patient Status:ER. Study [facility] [address] [facility] - Vascular Lab. Technical Quality:Adequate visualization.      - Results were reported to:Wilberto Ruvalcaba RN @ 12:23pm on 2/13/23. Velocities are measured in cm/s ; Diameters are measured in mm    Right Lower Extremities DVT Study Measurements  Right 2D Measurements  +--------------+----------+---------------+----------+  ! Location      ! Visualized! Compressibility! Thrombosis! +--------------+----------+---------------+----------+  ! Common Femoral!Yes       ! Yes            ! None      !  +--------------+----------+---------------+----------+    Right Doppler Measurements  +--------------+------+------+------------+  !Location      !Signal!Reflux!Reflux (sec)!  +--------------+------+------+------------+  !Common Femoral!Phasic!No    !            !  +--------------+------+------+------------+    Left Lower Extremities DVT Study Measurements  Left 2D Measurements  +------------------------+----------+---------------+----------+  !Location                !Visualized!Compressibility!Thrombosis!  +------------------------+----------+---------------+----------+  !Sapheno Femoral Junction!Yes       !Yes            !None      !  +------------------------+----------+---------------+----------+  !GSV Thigh               !Yes       !Yes            !None      !  +------------------------+----------+---------------+----------+  !Common Femoral          !Yes       !Yes            !None      !  +------------------------+----------+---------------+----------+  !Femoral                 !Yes       !Yes            !None      !  +------------------------+----------+---------------+----------+  !Prox Femoral            !Yes       !Yes            !None      !  +------------------------+----------+---------------+----------+  !Mid Femoral             !Yes       !Yes            !None      !  +------------------------+----------+---------------+----------+  !Dist Femoral            !Yes       !No             !Acute     !  +------------------------+----------+---------------+----------+  !Deep Femoral            !Yes       !Yes            !None      !  +------------------------+----------+---------------+----------+  !Popliteal               !Yes       !No             !Acute     !  +------------------------+----------+---------------+----------+  !GSV Below Knee          !Yes       !Yes            !None      !  +------------------------+----------+---------------+----------+  !Gastroc                 !Yes       !No             !Acute      !  +------------------------+----------+---------------+----------+  ! Soleal                  !Yes       ! No             !Acute     !  +------------------------+----------+---------------+----------+  ! PTV                     ! Yes       ! No             !Acute     !  +------------------------+----------+---------------+----------+  ! Peroneal                !Yes       ! No             !Acute     !  +------------------------+----------+---------------+----------+  ! GSV Calf                ! Yes       ! Yes            ! None      !  +------------------------+----------+---------------+----------+  ! SSV                     ! Yes       ! Yes            ! None      !  +------------------------+----------+---------------+----------+    Left Doppler Measurements  +------------------------+------+------+------------+  ! Location                ! Signal!Reflux! Reflux (sec)! +------------------------+------+------+------------+  ! Sapheno Femoral Junction! Phasic! No    !            !  +------------------------+------+------+------------+  ! Common Femoral          !Phasic! No    !            !  +------------------------+------+------+------------+  ! Femoral                 !Phasic! No    !            !  +------------------------+------+------+------------+  ! Dist Femoral            !Absent! No    !            !  +------------------------+------+------+------------+  ! Deep Femoral            !Phasic! No    !            !  +------------------------+------+------+------------+  ! Popliteal               !Absent! No    !            !  +------------------------+------+------+------------+  CT CHEST PULMONARY EMBOLISM W CONTRAST  Narrative: EXAMINATION:  CTA OF THE CHEST 2/13/2023 12:19 pm    TECHNIQUE:  CTA of the chest was performed after the administration of intravenous  contrast.  Multiplanar reformatted images are provided for review. MIP  images are provided for review.  Automated exposure control, iterative  reconstruction, and/or weight based adjustment of the mA/kV was utilized to  reduce the radiation dose to as low as reasonably achievable. COMPARISON:  None. HISTORY:  ORDERING SYSTEM PROVIDED HISTORY: sob/tachy/abnl cxr  TECHNOLOGIST PROVIDED HISTORY:  Reason for exam:->sob/tachy/abnl cxr  Decision Support Exception - unselect if not a suspected or confirmed  emergency medical condition->Emergency Medical Condition (MA)  Reason for Exam: chest pain in LT lower anterior chest, SOB,  dry cough ,  dizziness symptoms x 4-6 weeks but gerttting worse. pt did test (+) for  COVID today   pt has had COVID one previous time with no lasting complications  Relevant Medical/Surgical History: HTN,  GB removed    FINDINGS:  Pulmonary Arteries: The study is of good technical quality. Acute bilateral  pulmonary embolism is confirmed. A filling defect is observed within the  left upper lobar pulmonary artery. Filling defect also demonstrated within  posterior segmental branches of the right lower lobar pulmonary artery. Poor  characterization of the right upper lobar pulmonary artery due to dense beam  hardening artifact within the SVC. However, there does appear to be a  filling defect within an anterior segmental branch of the right upper lobar  pulmonary artery as well. RV: LV ratio 1.2. Mediastinum: Mild cardiomegaly. Small pericardial effusion. Normal aorta. Thyroid and esophagus normal.  Extensive mediastinal and hilar  lymphadenopathy. Index right paratracheal lymph node measures 13 mm. Lungs/pleura: The airways are patent. Small left pleural effusion. There is  a mass in the left pulmonary hilum extending into the upper lobe. Best  approximate measurements 3.5 x 3.5 cm. Of note, this mass appears to cross  the major fissure involving the superior segment of the left lower lobe as  well and insinuates within the left pulmonary hilum.   Additional solid  pulmonary nodules are identified in the bilateral lower lobes and right upper  lobe. An index nodule in the right upper lobe measures 7 x 5 mm. Mosaic  pattern of ground-glass opacification in the lungs diffusely. Interlobular  septal thickening is also demonstrated. Upper Abdomen: Multiple low attenuating masses are demonstrated within the  liver. These cannot be characterized on this angiographic study of the  chest.  The largest nodule in the right hepatic lobe measures 3.2 cm. Soft Tissues/Bones: No skeletal abnormalities throughout the chest.  Impression: 1. Acute bilateral pulmonary embolism is confirmed. 2. No significant right ventricular strain. 3. 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 Pulmonary follow-up is recommended. 4. Mediastinal and bilateral hilar lymphadenopathy. 5. Multiple small pulmonary nodules bilaterally. Pulmonary metastasis cannot  be excluded. 6. Suspected malignant left pleural effusion. 7. Suspected hepatic metastasis. 8. Mosaic attenuation throughout the lungs likely in association with known  pulmonary emboli. 9. Small pericardial effusion noted incidentally. Findings were discussed with the emergency room clinician at 1:15 pm on  2/13/2023. XR CHEST PORTABLE  Narrative: EXAMINATION:  ONE XRAY VIEW OF THE CHEST    2/13/2023 10:50 am    COMPARISON:  None. HISTORY:  ORDERING SYSTEM PROVIDED HISTORY: sob  TECHNOLOGIST PROVIDED HISTORY:  Reason for exam:->sob  Reason for Exam: sob    FINDINGS:  Diffuse airspace infiltrates. Prominence of the left superior mediastinal  border. No pneumothorax or pleural effusion  Impression: 1. Prominence of the left superior mediastinal border. It is unclear if  this represents a paramediastinal mass or lymphadenopathy. Recommend further  evaluation with a CT chest as there are no prior studies for comparison. 2.  Diffuse airspace infiltrates. These are indeterminate this could  represent pneumonia or edema.       Problem List  Patient Active Problem List   Diagnosis    Acute respiratory failure (HCC)       IMPRESSION/RECOMMENDATIONS:    1.) Left lung mass  - CTPA, 2/13/2023, showed a 3.5 x 3.5 cm left hilar mass, left pleural effusion, multiple liver mets. Bone mets seemed likely. - Would think we could hold heparin and biopsy the liver once her hypoxemic resp failure is improved. 2.) PE  - CTPA, 2/13/2023, showed bilateral PEs.  - On UFH. Stay on this for now.    3.) SARS-CoV-2  - SARS-CoV-2 PCR positive, 2/13/2023.  - CTPA, 2/13/2023, showed bilateral diffuse patchy GGO.  - She is on 5 LPM by NC.   - Might start Dex. - Ceftriaxone/Azithro for potential bacterial superinfection. - Will discuss with pulmonologist.    4.) Essential hypertension    5.) Hyperlipidemia    6.) Hypothyroidism    Thank you for asking me to see the patient.        Darrick Ortez MD  Please Contact Through [facility]

## 2023-02-14 NOTE — CONSULTS
INPATIENT PULMONARY CRITICAL CARE CONSULT NOTE      Chief Complaint/Referring Provider:  Patient is being seen at the request of Dr. Lidia Fiore MD, for a consultation for: acute resp failure, abn CT findings, assistance with mgmt     Presenting HPI: Umair Mitchell is a very pleasant 80-year-old female living with her  in [place]. The patient is a non-smoker. She used to work as a 's office. The patient has had history of asthma in the distant past, no symptoms for 15 years. She has no other relevant medical or surgical history. The patient and her  were on a medical mission in [facility]. Around mid December she developed upper respiratory symptoms followed by cough and wheezing. She was seen at an urgent care, was not given any steroid, given an inhaler. She felt this was one of her asthma episodes, symptoms are similar to her asthma episodes in the past.  Symptoms gradually resolved. She then came to [place] to see her daughter's family at [facility], was doing reasonably well. On returning to [facility]), she had another episode with shortness of breath, cough and wheezing. This time she was given steroids and bronchodilators but no antibiotics. The patient also had pain and bruising in the left leg that resolved spontaneously a few weeks ago. Over the weekend, patient began to develop respiratory symptoms once again. Her son-in-law, who is a physician, found that she could not complete full sentences while talking on the phone. She also complained of pain behind her left knee and mild swelling of her left leg. He asked her to come down to [place]. Her SaO2 was 86% on room air. She had mild nausea, no other GI or  complaints. Her appetite has been fair, she denied weight loss. She denies symptoms suggestive of IZA. There is a history of cancer on her paternal grandfather's family. She was seen in the ER, evaluated.   COVID test was positive, she has received 2 doses and 2 booster doses of the Moderna COVID-19 vaccine.  She has also received her flu shot and pneumonia vaccine.  The patient was found to have DVT in the left leg, pulmonary embolism, pleural effusion, lung mass, liver lesions.  She was placed on supplemental oxygen, placed on a heparin drip.  We are consulted for the lung mass, as is oncology.  She was seen by Dr. Amadeo Gonzales this morning, has been seen by cardiology as well.           Patient Active Problem List    Diagnosis Date Noted    Acute respiratory failure (HCC) 02/13/2023       Past Medical History:   Diagnosis Date    Asthma         History reviewed. No pertinent surgical history.     Family History   Problem Relation Age of Onset    Cancer Paternal Grandmother     Cancer Paternal Grandfather         Social History     Tobacco Use    Smoking status: Never    Smokeless tobacco: Never   Substance Use Topics    Alcohol use: Not Currently        Allergies   Allergen Reactions    Hydrocodone     Propoxyphene     Penicillin G Rash         Physical Exam:  Blood pressure (!) 153/97, pulse (!) 110, temperature 98.3 °F (36.8 °C), temperature source Oral, resp. rate 18, height 5' 1\" (1.549 m), weight 190 lb 1.6 oz (86.2 kg), SpO2 93 %.'   Constitutional: Very pleasant, overweight, on supplemental oxygen but in no acute distress.   HENT:  Oropharynx is clear and moist.  Class III airway, dental repair  Eyes:  Conjunctivae are normal. Pupils equal, round, and reactive to light. No scleral icterus.  Glasses  Neck: Relatively short neck. No tracheal deviation present. No obvious thyroid mass.   Cardiovascular: Normal rate, regular rhythm, normal heart sounds.  No lower extremity edema.  Pulmonary/Chest: No wheezes.  No rales. No accessory muscle usage or stridor.   Abdominal: Soft, mildly fatty abdominal wall, no distension or tenderness.    Musculoskeletal: No cyanosis. No clubbing. No obvious joint deformity.   Lymphadenopathy: No cervical or supraclavicular  adenopathy. Skin: Skin is warm and dry. No rash or nodules on the exposed extremities. Psychiatric: Normal mood and affect. Behavior is normal.  No anxiety. Neurologic: Alert, awake and oriented. PERRL. Speech fluent. No obvious neurologic deficit, detailed exam not performed        Results:  CBC:   Recent Labs     02/13/23  1112 02/14/23  0357   WBC 17.8* 17.3*   HGB 12.9 11.2*   HCT 39.7 34.1*   MCV 84.6 84.2   * 150     BMP:   Recent Labs     02/13/23  1112 02/14/23  0357    141   K 3.3* 3.4*    106   CO2 24 23   BUN 22* 20   CREATININE 0.8 0.7     LIVER PROFILE:   Recent Labs     02/13/23  1112   AST 34   ALT 21   BILITOT 0.9   ALKPHOS 351*       Recent Labs     02/13/23  1305   APTT 26.5     Imaging:  I have reviewed radiology images personally. CT CHEST PULMONARY EMBOLISM W CONTRAST   Final Result   1. Acute bilateral pulmonary embolism is confirmed. 2. No significant right ventricular strain. 3. 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 Pulmonary follow-up is recommended. 4. Mediastinal and bilateral hilar lymphadenopathy. 5. Multiple small pulmonary nodules bilaterally. Pulmonary metastasis cannot   be excluded. 6. Suspected malignant left pleural effusion. 7. Suspected hepatic metastasis. 8. Mosaic attenuation throughout the lungs likely in association with known   pulmonary emboli. 9. Small pericardial effusion noted incidentally. Findings were discussed with the emergency room clinician at 1:15 pm on   2/13/2023. VL Extremity Venous Left   Final Result      XR CHEST PORTABLE   Final Result   1. Prominence of the left superior mediastinal border. It is unclear if   this represents a paramediastinal mass or lymphadenopathy. Recommend further   evaluation with a CT chest as there are no prior studies for comparison. 2.  Diffuse airspace infiltrates.   These are indeterminate this could represent pneumonia or edema. VL Extremity Venous Left    Result Date: 2/13/2023  Lower Extremities DVT Study  Demographics   Patient Name      Wilder Christianson   Date of Study     02/13/2023          Gender              Female   Patient Number    [id number]          Date of Birth       1955   Visit Number      [id number]           Age                 79 year(s)   Accession Number  [id number]          Room Number         07   Corporate ID      [id number]           [address], [place],                                                            [address] [facility], [facility] . [facility], [facility] [address], [address]  Interpreting        [facility] Vascular  Physician                             Physician           Readers                                                            Benny Barrera MD  Procedure Type of Study:   Veins:Lower Extremities DVT Study, VL EXTREMITY VENOUS DUPLEX LEFT. Vascular Sonographer Report  Indications for Study:Swelling and Leg pain. Additional Indications:Patient scanned bedside in ED with complaints of left lower extremity swelling for 1 week with shortness of breath. Patient noted pain behind knee over the past two days. Recent travel where patient was sedentary for over 8 hours. Venous Duplex Scan: B-mode imaging of the deep and superficial veins, with compression maneuvers, including color and Doppler spectral waveform analysis. Impressions Right Impression There is no evidence of deep venous thrombosis involving the right common femoral vein. Left Impression Acute totally occluding deep vein thrombosis involving the left femoral vein at the distal thigh, popliteal vein, gastrocnemius veins (4 of 4), posterior tibial, peroneal and soleal veins. No other evidence of deep vein or superficial vein thrombosis involving the left lower extremity. There is no previous exam for comparison.  Conclusions   Summary   Extensive acute deep vein thrombosis involving the left femoral vein,  popliteal vein, gastrocnemius veins, posterior tibial, peroneal and soleal  veins. Signature   ------------------------------------------------------------------  Electronically signed by Aleah Serrano MD (Interpreting  physician) on 02/13/2023 at 04:27 PM  ------------------------------------------------------------------  Patient   XR CHEST PORTABLE    Result Date: 2/13/2023  EXAMINATION: ONE XRAY VIEW OF THE CHEST 2/13/2023 10:50 am COMPARISON: None. HISTORY: ORDERING SYSTEM PROVIDED HISTORY: sob TECHNOLOGIST PROVIDED HISTORY: Reason for exam:->sob Reason for Exam: sob FINDINGS: Diffuse airspace infiltrates. Prominence of the left superior mediastinal border. No pneumothorax or pleural effusion     1. Prominence of the left superior mediastinal border. It is unclear if this represents a paramediastinal mass or lymphadenopathy. Recommend further evaluation with a CT chest as there are no prior studies for comparison. 2.  Diffuse airspace infiltrates. These are indeterminate this could represent pneumonia or edema. CT CHEST PULMONARY EMBOLISM W CONTRAST    Result Date: 2/13/2023  EXAMINATION: CTA OF THE CHEST 2/13/2023 12:19 pm TECHNIQUE: CTA of the chest was performed after the administration of intravenous contrast.  Multiplanar reformatted images are provided for review. MIP images are provided for review. Automated exposure control, iterative reconstruction, and/or weight based adjustment of the mA/kV was utilized to reduce the radiation dose to as low as reasonably achievable. COMPARISON: None.  HISTORY: ORDERING SYSTEM PROVIDED HISTORY: sob/tachy/abnl cxr TECHNOLOGIST PROVIDED HISTORY: Reason for exam:->sob/tachy/abnl cxr Decision Support Exception - unselect if not a suspected or confirmed emergency medical condition->Emergency Medical Condition (MA) Reason for Exam: chest pain in LT lower anterior chest, SOB,  dry cough , dizziness symptoms x 4-6 weeks but gerttting worse. pt did test (+) for COVID today   pt has had COVID one previous time with no lasting complications Relevant Medical/Surgical History: HTN,  GB removed FINDINGS: Pulmonary Arteries: The study is of good technical quality. Acute bilateral pulmonary embolism is confirmed. A filling defect is observed within the left upper lobar pulmonary artery. Filling defect also demonstrated within posterior segmental branches of the right lower lobar pulmonary artery. Poor characterization of the right upper lobar pulmonary artery due to dense beam hardening artifact within the SVC. However, there does appear to be a filling defect within an anterior segmental branch of the right upper lobar pulmonary artery as well. RV: LV ratio 1.2. Mediastinum: Mild cardiomegaly. Small pericardial effusion. Normal aorta. Thyroid and esophagus normal.  Extensive mediastinal and hilar lymphadenopathy. Index right paratracheal lymph node measures 13 mm. Lungs/pleura: The airways are patent. Small left pleural effusion. There is a mass in the left pulmonary hilum extending into the upper lobe. Best approximate measurements 3.5 x 3.5 cm. Of note, this mass appears to cross the major fissure involving the superior segment of the left lower lobe as well and insinuates within the left pulmonary hilum. Additional solid pulmonary nodules are identified in the bilateral lower lobes and right upper lobe. An index nodule in the right upper lobe measures 7 x 5 mm. Mosaic pattern of ground-glass opacification in the lungs diffusely. Interlobular septal thickening is also demonstrated. Upper Abdomen: Multiple low attenuating masses are demonstrated within the liver. These cannot be characterized on this angiographic study of the chest.  The largest nodule in the right hepatic lobe measures 3.2 cm.  Soft Tissues/Bones: No skeletal abnormalities throughout the chest.     1. Acute bilateral pulmonary embolism is confirmed. 2. No significant right ventricular strain. 3. Large mass centered in the left pulmonary hilum, suspected to represent bronchogenic carcinoma. Pulmonary follow-up is recommended. 4. Mediastinal and bilateral hilar lymphadenopathy. 5. Multiple small pulmonary nodules bilaterally. Pulmonary metastasis cannot be excluded. 6. Suspected malignant left pleural effusion. 7. Suspected hepatic metastasis. 8. Mosaic attenuation throughout the lungs likely in association with known pulmonary emboli. 9. Small pericardial effusion noted incidentally. Findings were discussed with the emergency room clinician at 1:15 pm on 2/13/2023. Echocardiogram: None in EMR  PFT: None in EMR        Assessment and plan:  Acute respiratory failure, hypoxemic. Probably related to pneumonia process in the lungs, potentially due to COVID-19 infection. Oxygen to keep SaO2 >90%  Pulmonary embolism. On heparin, echocardiogram pending  DVT. Extensive down the left leg. On heparin. No obvious risk factors, could be hypercoagulable from likely underlying malignancy  Lung mass. Proximally with adenopathy, could represent small cell lung cancer. The patient is aware that this is a suspected diagnosis  Hilar and mediastinal adenopathy. Could be a candidate for EBUS, but I believe the liver is a better target for biopsy  Pleural effusion. Likely malignant, small. Not sufficient to yield on cytology to make a definitive diagnosis in many patients  Liver lesions. Likely metastatic. Will discuss with IR about doing a biopsy under ultrasound  Pericardial effusion. Small, unclear etiology  COVID-19 infection, likely COVID 19 pneumonia. Usually mild course after immunization, the older vaccines would not protect against the newer strains. Agree with dexamethasone suggested by Dr. Aaron Bernardo  Lactic acidosis. No suggestion of progressive sepsis  Leukocytosis. Empiric antibiotic for now.   Bacterial pneumonia cannot be ruled out  Normochromic, normocytic anemia. Mild, follow H&H. Hypokalemia. Replace and monitor  Elevated proBNP. Imaging does not suggest CHF  Elevated troponin. Likely nonspecific  DVT prophylaxis. On heparin      I have examined the patient, reviewed labs, images and medical records. My impression and recommendations are as above. Discussed with Dr. Pola Nguyen. We will follow with you, thank you for the consultation.       Electronically signed by:  Nicho Lara MD    2/14/2023    7:46 AM.

## 2023-02-14 NOTE — CONSULTS
[facility]  [phone]      Attending Physician: Veda Howell MD  Reason for Consultation/Chief Complaint: Elevated troponin    Subjective   History of Present Illness:  Farrell Favre is a 79 y.o. female with a history of asthma who presented with shortness of breath. The patient resides in [place] and recently took a long car drive to [facility] to do [email or web address] work. She says that they drove for 4-5 hours at a time before stopping. For the last few weeks, she reports that she has had shortness of breath with relatively minimal exertion. She was recently treated at an urgent care with prednisone and inhalers. However, after this she said that her shortness of breath became more severe. She denies any associated fevers, chills, cough, or chest pain. She says that she had some mild lower extremity edema which has since improved. She has been vaccinated for COVID-19 x4 and is unaware of any recent sick contacts. She notified her son-in-law for worsening symptoms, who is a physician in [facility], and he recommended that she return to the [facility] to be further evaluated. On arrival to the ED, the patient was afebrile and tachycardic with initial heart rate of 115 bpm, and /85. She currently has an oxygen saturation of 93% on 5 L supplemental O2. EKG showed sinus tachycardia with non-specific ST abnormality. Troponin T trended 0.05-->0.08-->0.08-->0. 07. A CTPA was obtained and showed acute bilateral pulmonary emboli. There was also a large mass centered in the left pulmonary hilum, suspected to represent bronchogenic carcinoma with associated mediastinal and bilateral hilar lymphadenopathy. There was additionally a left pleural effusion present and multiple low attenuating masses in the liver, concerning for possible hepatic metastases. Small pericardial effusion was also present.   A lower extremity venous duplex ultrasound showed extensive acute deep venous thrombosis involving the left femoral vein, popliteal vein, gastrocnemius veins, posterior tibial, peroneal, and soleal veins. She additionally tested positive for COVID-19. Past Medical History:   has a past medical history of Asthma. Surgical History:   has no past surgical history on file. Social History:   reports that she has never smoked. She has never used smokeless tobacco. She reports that she does not currently use alcohol. She reports that she does not use drugs. Family History:  family history includes Cancer in her paternal grandfather and paternal grandmother. Home Medications:  Were reviewed and are listed in nursing record and/or below  Prior to Admission medications    Medication Sig Start Date End Date Taking?  Authorizing Provider   Coenzyme Q10-Fish Oil-Vit E (CO-Q 10 OMEGA-3 FISH OIL PO) Take by mouth daily   Yes Historical Provider, MD   CALCIUM LACTATE PO Take 250 mg by mouth in the morning and at bedtime   Yes Historical Provider, MD   Bacillus Coagulans-Inulin (ALIGN PREBIOTIC-PROBIOTIC PO) Take by mouth daily   Yes Historical Provider, MD   TURMERIC PO Take 1,000 mg by mouth daily   Yes Historical Provider, MD   VITAMIN D PO Take 10,000 Int'l Units by mouth daily   Yes Historical Provider, MD   Biotin 1000 MCG TABS Take 10,000 mcg by mouth nightly   Yes Historical Provider, MD   magnesium lactate (MAG-TAB CR) 84 MG (7MEQ) TBCR extended release tablet Take by mouth nightly   Yes Historical Provider, MD   Multiple Vitamins-Minerals (ICAPS AREDS 2 PO) Take 1 tablet by mouth in the morning and at bedtime   Yes Historical Provider, MD   L-Lysine 1000 MG TABS Take by mouth daily   Yes Historical Provider, MD   Melatonin 5 MG CHEW Take 5 mg by mouth nightly   Yes Historical Provider, MD   atorvastatin (LIPITOR) 40 MG tablet atorvastatin 40 mg tablet    Historical Provider, MD   thyroid (ARMOUR) 30 MG tablet Take one tablet on Monday, [address], and Friday 7/26/17   Historical Provider, MD   losartan-hydroCHLOROthiazide (HYZAAR) 100-12.5 MG per tablet Takes half in morning and half at night    Historical Provider, MD   omeprazole (PRILOSEC) 40 MG delayed release capsule omeprazole 40 mg capsule,delayed release    Historical Provider, MD   thyroid (ARMOUR) 60 MG tablet Take 60 mg by mouth daily 1 tablet on Sunday, Tuesday, Thursday and Saturday    Historical Provider, MD        CURRENT Medications:  losartan (COZAAR) tablet 50 mg, BID   And  hydroCHLOROthiazide (MICROZIDE) capsule 12.5 mg, Daily  pantoprazole (PROTONIX) tablet 40 mg, QAM AC  dexamethasone (DECADRON) injection 6 mg, Q24H  heparin (porcine) injection 5,000 Units, PRN  heparin (porcine) injection 2,500 Units, PRN  heparin 25,000 unit in sodium chloride 0.45% 250 mL (premix) infusion, Continuous  0.9 % sodium chloride infusion, Continuous  atorvastatin (LIPITOR) tablet 40 mg, Nightly  [START ON 2/15/2023] thyroid (ARMOUR) tablet 30 mg, Once per day on Mon Wed Fri  thyroid (ARMOUR) tablet 60 mg, Once per day on Sun Tue Thu Sat  sodium chloride flush 0.9 % injection 5-40 mL, 2 times per day  sodium chloride flush 0.9 % injection 5-40 mL, PRN  0.9 % sodium chloride infusion, PRN  ondansetron (ZOFRAN-ODT) disintegrating tablet 4 mg, Q8H PRN   Or  ondansetron (ZOFRAN) injection 4 mg, Q6H PRN  polyethylene glycol (GLYCOLAX) packet 17 g, Daily PRN  acetaminophen (TYLENOL) tablet 650 mg, Q6H PRN   Or  acetaminophen (TYLENOL) suppository 650 mg, Q6H PRN  cefTRIAXone (ROCEPHIN) 1,000 mg in sodium chloride 0.9 % 50 mL IVPB (mini-bag), Q24H  azithromycin (ZITHROMAX) 250 mg in sodium chloride 0.9 % 250 mL IVPB, Q24H  perflutren lipid microspheres (DEFINITY) injection 1.5 mL, ONCE PRN  ipratropium-albuterol (DUONEB) nebulizer solution 1 ampule, Q4H PRN        Allergies:  Hydrocodone, Propoxyphene, and Penicillin g     Review of Systems:   A 14 point review of symptoms was completed. Pertinent positives were identified in the HPI.   All other review of symptoms negative unless otherwise noted below. Objective   PHYSICAL EXAM:    Vitals:    02/14/23 0800   BP: 131/81   Pulse: (!) 111   Resp: 18   Temp: 98.2 °F (36.8 °C)   SpO2: 92%    Weight: 190 lb 1.6 oz (86.2 kg)       General: Adult female wearing nasal cannula. HEENT: Normocephalic, atraumatic, non-icteric, hearing intact, nares normal, mucous membranes moist.  Neck: Supple, trachea midline. No adenopathy. No thyromegaly. No carotid bruits. No JVD. Heart: Mildly tachycardic with regular rhythm. Normal S1 and S2. No murmurs, gallops or rubs. Lungs: Normal respiratory effort. Breath sounds diminished bilaterally. Mild fine crackles present. Abdomen: Soft, non-tender. Normoactive bowel sounds. No masses or organomegaly. Skin: No rashes, wounds, or lesions. Pulses: 2+ and symmetric. Extremities: No clubbing, cyanosis, or edema. Musculoskeletal: Spontaneously moves all four extremities. Psych: Normal mood and affect. Neuro: Alert and oriented to person, place, and time. No focal deficits noted.       Labs   CBC:   Lab Results   Component Value Date/Time    WBC 17.3 02/14/2023 03:57 AM    RBC 4.05 02/14/2023 03:57 AM    HGB 11.2 02/14/2023 03:57 AM    HCT 34.1 02/14/2023 03:57 AM    MCV 84.2 02/14/2023 03:57 AM    RDW 17.7 02/14/2023 03:57 AM     02/14/2023 03:57 AM     CMP:  Lab Results   Component Value Date/Time     02/14/2023 03:57 AM    K 3.4 02/14/2023 03:57 AM     02/14/2023 03:57 AM    CO2 23 02/14/2023 03:57 AM    BUN 20 02/14/2023 03:57 AM    CREATININE 0.7 02/14/2023 03:57 AM    AGRATIO 1.3 02/13/2023 11:12 AM    LABGLOM >60 02/14/2023 03:57 AM    GLUCOSE 97 02/14/2023 03:57 AM    PROT 7.2 02/13/2023 11:12 AM    CALCIUM 9.1 02/14/2023 03:57 AM    BILITOT 0.9 02/13/2023 11:12 AM    ALKPHOS 351 02/13/2023 11:12 AM    AST 34 02/13/2023 11:12 AM    ALT 21 02/13/2023 11:12 AM     PT/INR:  No results found for: PTINR  HgBA1c:No results found for: LABA1C  Lab Results Component Value Date    TROPONINI 0.08 (H) 02/14/2023         Cardiac Data     Last EKG: Sinus tachycardia, non-specific T wave abnormality    Echo: N/A    Stress Test: N/A    Cath: N/A    Other Studies:   Chest X-ray 2/13/23:  1. Prominence of the left superior mediastinal border. It is unclear if   this represents a paramediastinal mass or lymphadenopathy. Recommend further   evaluation with a CT chest as there are no prior studies for comparison. 2.  Diffuse airspace infiltrates. These are indeterminate this could   represent pneumonia or edema. CTPA 2/13/23:  1. Acute bilateral pulmonary embolism is confirmed. 2. No significant right ventricular strain. 3. 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 Pulmonary follow-up is recommended. 4. Mediastinal and bilateral hilar lymphadenopathy. 5. Multiple small pulmonary nodules bilaterally. Pulmonary metastasis cannot   be excluded. 6. Suspected malignant left pleural effusion. 7. Suspected hepatic metastasis. 8. Mosaic attenuation throughout the lungs likely in association with known   pulmonary emboli. 9. Small pericardial effusion noted incidentally. Assessment and Plan      1. Mild troponin T elevation - Suspect troponin T is mildly elevated in setting of acute bilateral PEs and demand ischemia from acute hypoxic respiratory which is again likely attributable to combination of acute PEs, COVID-19 pneumonia, and large lung mass concerning for possible malignancy.   Overall, troponin trend has been relatively flat and there is lower concern for an acute coronary syndrome at this time.  -Agree with plans  to obtain TTE for complete assessment of cardiac structure and function  -Can stop trending troponins  -Continue aspirin 81 mg daily and atorvastatin 40 mg qHS  -If TTE shows normal LV systolic function and wall motion, can hold off on additional inpatient ischemic evaluation at this time and can consider obtaining outpatient nuclear SPECT stress test for further cardiac risk stratification, once patient has had additional time to recover from her multiple other acute medical issues and has undergone additional evaluation for her lung mass. 2. Bilateral pulmonary emboli/DVT  -Can evaluate pulmonary pressures and right heart size/function on TTE above  -Continue IV heparin infusion and can plan to transition to DOAC prior to discharge    3. Pericardial effusion  -Appeared small on CT and will further evaluate on TTE  -Could possibly be malignant in setting of underlying lung mass or possibly inflammatory given recent COVID-19 infection  -No current evidence of tamponade physiology and will continue to monitor    4. Lung mass with possible hepatic mets  -Pulmonology and oncology following  -Planning for eventual liver biopsy    5. Left pleural effusion  -Could possibly be malignant in etiology given above findings  -Would hold off on diuresis for now    6. Acute hypoxic respiratory failure  -In setting of multiple other acute medical issues above  -Wean supplemental O2 as tolerated to maintain goal O2 saturation >90%    7. COVID-19 infection  -Further treatment per primary team        Thank you for allowing us to participate in the care of Loan Hou. Please call me with any questions [phone]. [facility]  [phone] [facility]  [phone] [address] [place] [address]  2/14/2023 8:06 AM      I will address the patient's cardiac risk factors and adjusted pharmacologic treatment as needed. In addition, I have reinforced the need for patient directed risk factor modification. All questions and concerns were addressed to the patient/family. Alternatives to my treatment were discussed. The note was completed using EMR. Every effort was made to ensure accuracy; however, inadvertent computerized transcription errors may be present.

## 2023-02-15 PROBLEM — C78.7 METASTASIS TO LIVER (HCC): Status: ACTIVE | Noted: 2023-02-15

## 2023-02-15 PROBLEM — R91.8 MASS OF LEFT LUNG: Status: ACTIVE | Noted: 2023-02-15

## 2023-02-15 PROBLEM — R59.0 HILAR ADENOPATHY: Status: ACTIVE | Noted: 2023-02-15

## 2023-02-15 PROBLEM — U07.1 PNEUMONIA DUE TO COVID-19 VIRUS: Status: ACTIVE | Noted: 2023-02-15

## 2023-02-15 PROBLEM — R59.0 MEDIASTINAL ADENOPATHY: Status: ACTIVE | Noted: 2023-02-15

## 2023-02-15 PROBLEM — J12.82 PNEUMONIA DUE TO COVID-19 VIRUS: Status: ACTIVE | Noted: 2023-02-15

## 2023-02-15 LAB
ANION GAP SERPL CALCULATED.3IONS-SCNC: 13 MMOL/L (ref 3–16)
ANTI-XA UNFRAC HEPARIN: 0.38 IU/ML (ref 0.3–0.7)
BASOPHILS ABSOLUTE: 0.1 K/UL (ref 0–0.2)
BASOPHILS RELATIVE PERCENT: 0.4 %
BUN BLDV-MCNC: 15 MG/DL (ref 7–20)
C-REACTIVE PROTEIN: 51.2 MG/L (ref 0–5.1)
CALCIUM SERPL-MCNC: 9 MG/DL (ref 8.3–10.6)
CHLORIDE BLD-SCNC: 110 MMOL/L (ref 99–110)
CO2: 21 MMOL/L (ref 21–32)
CREAT SERPL-MCNC: 0.7 MG/DL (ref 0.6–1.2)
EOSINOPHILS ABSOLUTE: 0.2 K/UL (ref 0–0.6)
EOSINOPHILS RELATIVE PERCENT: 1.1 %
GFR SERPL CREATININE-BSD FRML MDRD: >60 ML/MIN/{1.73_M2}
GLUCOSE BLD-MCNC: 100 MG/DL (ref 70–99)
HCT VFR BLD CALC: 31.4 % (ref 36–48)
HEMOGLOBIN: 10.3 G/DL (ref 12–16)
INR BLD: 1.16 (ref 0.87–1.14)
LACTATE DEHYDROGENASE: 548 U/L (ref 100–190)
LYMPHOCYTES ABSOLUTE: 1.8 K/UL (ref 1–5.1)
LYMPHOCYTES RELATIVE PERCENT: 12.7 %
MAGNESIUM: 2.3 MG/DL (ref 1.8–2.4)
MCH RBC QN AUTO: 28.1 PG (ref 26–34)
MCHC RBC AUTO-ENTMCNC: 32.7 G/DL (ref 31–36)
MCV RBC AUTO: 85.7 FL (ref 80–100)
MONOCYTES ABSOLUTE: 0.9 K/UL (ref 0–1.3)
MONOCYTES RELATIVE PERCENT: 6.3 %
NEUTROPHILS ABSOLUTE: 11.2 K/UL (ref 1.7–7.7)
NEUTROPHILS RELATIVE PERCENT: 79.5 %
PDW BLD-RTO: 17.4 % (ref 12.4–15.4)
PLATELET # BLD: 157 K/UL (ref 135–450)
PMV BLD AUTO: 9.5 FL (ref 5–10.5)
POTASSIUM REFLEX MAGNESIUM: 3.3 MMOL/L (ref 3.5–5.1)
PROTHROMBIN TIME: 14.8 SEC (ref 11.7–14.5)
RBC # BLD: 3.66 M/UL (ref 4–5.2)
SEDIMENTATION RATE, ERYTHROCYTE: 4 MM/HR (ref 0–30)
SODIUM BLD-SCNC: 144 MMOL/L (ref 136–145)
WBC # BLD: 14.1 K/UL (ref 4–11)

## 2023-02-15 PROCEDURE — 6370000000 HC RX 637 (ALT 250 FOR IP): Performed by: INTERNAL MEDICINE

## 2023-02-15 PROCEDURE — 36415 COLL VENOUS BLD VENIPUNCTURE: CPT

## 2023-02-15 PROCEDURE — 85025 COMPLETE CBC W/AUTO DIFF WBC: CPT

## 2023-02-15 PROCEDURE — 99232 SBSQ HOSP IP/OBS MODERATE 35: CPT | Performed by: NURSE PRACTITIONER

## 2023-02-15 PROCEDURE — 83735 ASSAY OF MAGNESIUM: CPT

## 2023-02-15 PROCEDURE — 2580000003 HC RX 258: Performed by: INTERNAL MEDICINE

## 2023-02-15 PROCEDURE — 85610 PROTHROMBIN TIME: CPT

## 2023-02-15 PROCEDURE — 85520 HEPARIN ASSAY: CPT

## 2023-02-15 PROCEDURE — 6360000002 HC RX W HCPCS: Performed by: INTERNAL MEDICINE

## 2023-02-15 PROCEDURE — 2500000003 HC RX 250 WO HCPCS: Performed by: INTERNAL MEDICINE

## 2023-02-15 PROCEDURE — 85652 RBC SED RATE AUTOMATED: CPT

## 2023-02-15 PROCEDURE — 6370000000 HC RX 637 (ALT 250 FOR IP): Performed by: NURSE PRACTITIONER

## 2023-02-15 PROCEDURE — 99233 SBSQ HOSP IP/OBS HIGH 50: CPT | Performed by: INTERNAL MEDICINE

## 2023-02-15 PROCEDURE — 1200000000 HC SEMI PRIVATE

## 2023-02-15 PROCEDURE — 86140 C-REACTIVE PROTEIN: CPT

## 2023-02-15 PROCEDURE — 80048 BASIC METABOLIC PNL TOTAL CA: CPT

## 2023-02-15 PROCEDURE — 83615 LACTATE (LD) (LDH) ENZYME: CPT

## 2023-02-15 RX ORDER — TRAMADOL HYDROCHLORIDE 50 MG/1
50 TABLET ORAL EVERY 6 HOURS PRN
Status: DISCONTINUED | OUTPATIENT
Start: 2023-02-15 | End: 2023-02-22 | Stop reason: HOSPADM

## 2023-02-15 RX ORDER — ASCORBIC ACID 500 MG
500 TABLET ORAL DAILY
Status: DISCONTINUED | OUTPATIENT
Start: 2023-02-15 | End: 2023-02-22 | Stop reason: HOSPADM

## 2023-02-15 RX ADMIN — HEPARIN SODIUM 1130 UNITS/HR: 10000 INJECTION, SOLUTION INTRAVENOUS at 10:46

## 2023-02-15 RX ADMIN — TRAMADOL HYDROCHLORIDE 50 MG: 50 TABLET, COATED ORAL at 18:04

## 2023-02-15 RX ADMIN — PANTOPRAZOLE SODIUM 40 MG: 40 TABLET, DELAYED RELEASE ORAL at 05:13

## 2023-02-15 RX ADMIN — Medication 50 MG: at 17:46

## 2023-02-15 RX ADMIN — ACETAMINOPHEN 650 MG: 325 TABLET ORAL at 14:26

## 2023-02-15 RX ADMIN — CEFTRIAXONE SODIUM 1000 MG: 1 INJECTION, POWDER, FOR SOLUTION INTRAMUSCULAR; INTRAVENOUS at 14:29

## 2023-02-15 RX ADMIN — SODIUM CHLORIDE, PRESERVATIVE FREE 10 ML: 5 INJECTION INTRAVENOUS at 07:43

## 2023-02-15 RX ADMIN — SODIUM CHLORIDE 1000 ML: 9 INJECTION, SOLUTION INTRAVENOUS at 18:16

## 2023-02-15 RX ADMIN — DIPHENHYDRAMINE HCL 25 MG: 25 TABLET ORAL at 19:48

## 2023-02-15 RX ADMIN — SODIUM CHLORIDE 1000 ML: 9 INJECTION, SOLUTION INTRAVENOUS at 07:39

## 2023-02-15 RX ADMIN — AZITHROMYCIN MONOHYDRATE 250 MG: 500 INJECTION, POWDER, LYOPHILIZED, FOR SOLUTION INTRAVENOUS at 16:11

## 2023-02-15 RX ADMIN — HYDROCHLOROTHIAZIDE 12.5 MG: 12.5 CAPSULE ORAL at 07:37

## 2023-02-15 RX ADMIN — DEXAMETHASONE SODIUM PHOSPHATE 6 MG: 4 INJECTION, SOLUTION INTRAMUSCULAR; INTRAVENOUS at 07:37

## 2023-02-15 RX ADMIN — THYROID, PORCINE 30 MG: 30 TABLET ORAL at 05:13

## 2023-02-15 RX ADMIN — Medication 5 MG: at 19:48

## 2023-02-15 RX ADMIN — ASPIRIN 81 MG 81 MG: 81 TABLET ORAL at 07:38

## 2023-02-15 RX ADMIN — LOSARTAN POTASSIUM 50 MG: 25 TABLET, FILM COATED ORAL at 07:37

## 2023-02-15 RX ADMIN — LOSARTAN POTASSIUM 50 MG: 25 TABLET, FILM COATED ORAL at 19:48

## 2023-02-15 RX ADMIN — ACETAMINOPHEN 650 MG: 325 TABLET ORAL at 06:14

## 2023-02-15 RX ADMIN — OXYCODONE HYDROCHLORIDE AND ACETAMINOPHEN 500 MG: 500 TABLET ORAL at 13:16

## 2023-02-15 RX ADMIN — ATORVASTATIN CALCIUM 40 MG: 40 TABLET, FILM COATED ORAL at 19:48

## 2023-02-15 RX ADMIN — SODIUM CHLORIDE, PRESERVATIVE FREE 10 ML: 5 INJECTION INTRAVENOUS at 19:49

## 2023-02-15 ASSESSMENT — PAIN SCALES - GENERAL
PAINLEVEL_OUTOF10: 5
PAINLEVEL_OUTOF10: 6
PAINLEVEL_OUTOF10: 6

## 2023-02-15 ASSESSMENT — PAIN DESCRIPTION - LOCATION
LOCATION: RIB CAGE
LOCATION: SHOULDER

## 2023-02-15 ASSESSMENT — PAIN DESCRIPTION - ORIENTATION
ORIENTATION: LEFT
ORIENTATION: RIGHT

## 2023-02-15 NOTE — PROGRESS NOTES
[facility]   Daily Progress Note      Admit Date:  2/13/2023    Reason for follow up visit: Elevated troponin    CC: \" I'm doing okay. Just a lot to take in.\"    80 y/o female with PMH significant for asthma admitted to [facility] after presenting with SOB. The patient resides in [place] and recently took a long car drive to [facility] to do [id number] work. She says that they drove for 4-5 hours at a time before stopping. For the last few weeks, she reports that she has had shortness of breath with relatively minimal exertion. She was recently treated at an urgent care with prednisone and inhalers. However, after this she said that her shortness of breath became more severe. She denies any associated fevers, chills, cough, or chest pain. She says that she had some mild lower extremity edema which has since improved. She has been vaccinated for COVID-19 x4 and is unaware of any recent sick contacts. She notified her son-in-law for worsening symptoms, who is a physician in [facility], and he recommended that she return to the [facility] to be further evaluated. [place] [address] ED she was found to be afebrile and tachycardic and with slightly elevated troponin. CTPA showed acute bilateral pulmonary emboli and a large mass in the L pulmonary hilum. Also noted to have multiple low attenuation masses in the liver. Small pericardial effusion noted. + for COVID 19. Lower extremity US demonstrated extensive acute DVT. CT reviewed by vascular and not felt to be an EKOS candidate. Interval History:  Pt. seen and examined; records reviewed  BP reviewed. Remains on O2 @ 5L  Tentative liver biopsy scheduled for 2/17/23  K+ 3.3  Denies chest pain or SOB    Subjective:  Pt with no acute overnight cardiac events. Denies chest pain, palpitations or dizziness  + SOB: occasional cough  . Review of Systems:   Constitutional: no unanticipated weight loss. There's been no change in energy level, sleep pattern, or activity level.    No fevers, chills. Eyes: No visual changes or diplopia. No scleral icterus. ENT: No Headaches, hearing loss or vertigo. No mouth sores or sore throat. Cardiovascular: as reviewed in HPI + occasional LE edema  Respiratory: + cough and SOB. No hemoptysis. Gastrointestinal: No abdominal pain, appetite loss, blood in stools. No change in bowel or bladder habits. Genitourinary: No dysuria, trouble voiding, or hematuria. Musculoskeletal:  No gait disturbance, no joint complaints. Integumentary: No rash or pruritis. Neurological: No headache, diplopia, change in muscle strength, numbness or tingling. Psychiatric: No anxiety or depression. Endocrine: No temperature intolerance. No excessive thirst, fluid intake, or urination. No tremor. Hematologic/Lymphatic: No abnormal bruising or bleeding, blood clots or swollen lymph nodes. Allergic/Immunologic: No nasal congestion or hives. Objective:   /88   Pulse 97   Temp 97.9 °F (36.6 °C) (Oral)   Resp 16   Ht 5' 1\" (1.549 m)   Wt 194 lb 1.6 oz (88 kg)   SpO2 93%   BMI 36.67 kg/m²     Intake/Output Summary (Last 24 hours) at 2/15/2023 0857  Last data filed at 2/15/2023 0541  Gross per 24 hour   Intake 240 ml   Output 2700 ml   Net -2460 ml     Wt Readings from Last 3 Encounters:   02/15/23 194 lb 1.6 oz (88 kg)   02/13/23 191 lb 12.8 oz (87 kg)   12/30/22 194 lb 9.6 oz (88.3 kg)       Physical Exam:  General: In no acute distress. Awake, alert, and oriented X4. Resting in bed  HEENT: Sclerae anicteric. No xanthelasmas. Normo cephalic  Skin:  Warm and dry. No new appearing rashes or lesions. Neck:  Supple. No JVD or carotid bruit appreciated. Chest: Lungs clear to auscultation. No wheezes/rhonchi/rales  Cardiovascular:  RRR. Normal S1 and S2. No murmur/gallop or rubs   Abdomen:  obese, soft, nontender, +bowel sounds. Extremities:Trace bilateral ankle edema. No clubbing or cyanosis. 2+ bilateral radial/DP/PT pulses.  Cap refill brisk    Medications: losartan  50 mg Oral BID    And    hydroCHLOROthiazide  12.5 mg Oral Daily    pantoprazole  40 mg Oral QAM AC    dexamethasone  6 mg IntraVENous Q24H    aspirin  81 mg Oral Daily    melatonin  5 mg Oral Nightly    atorvastatin  40 mg Oral Nightly    thyroid  30 mg Oral Once per day on Mon Wed Fri    thyroid  60 mg Oral Once per day on Sun Tue Thu Sat    sodium chloride flush  5-40 mL IntraVENous 2 times per day    cefTRIAXone (ROCEPHIN) IV  1,000 mg IntraVENous Q24H    azithromycin  250 mg IntraVENous Q24H      heparin (PORCINE) Infusion 1,130 Units/hr (02/14/23 1215)    sodium chloride 1,000 mL (02/15/23 0739)    sodium chloride       diphenhydrAMINE, heparin (porcine), heparin (porcine), sodium chloride flush, sodium chloride, ondansetron **OR** ondansetron, polyethylene glycol, acetaminophen **OR** acetaminophen, perflutren lipid microspheres, ipratropium-albuterol    Lab Data:  CBC:   Recent Labs     02/13/23  1112 02/14/23  0357 02/15/23  0601   WBC 17.8* 17.3* 14.1*   HGB 12.9 11.2* 10.3*   * 150 157     BMP:    Recent Labs     02/13/23  1112 02/14/23  0357 02/15/23  0601    141 144   K 3.3* 3.4* 3.3*   CO2 24 23 21   BUN 22* 20 15   CREATININE 0.8 0.7 0.7     LIVR:   Recent Labs     02/13/23  1112   AST 34   ALT 21     INR:  No results for input(s): INR in the last 72 hours. APTT:   Recent Labs     02/13/23  1305   APTT 26.5     ECG 2/13/23:  Sinus tachycardia with nonspecific ST-TW abnormalities    Echo 2/13/23:   Left ventricular systolic function is normal with a 3D calculated EF of 56%. The left ventricle is normal in size with mild concentric hypertrophy. Flattened in systole (\"D-shaped septum\") consistent with right ventricular   pressure overload. Normal left ventricular diastolic function. Right ventricular systolic function is reduced. Inadequate tricuspid valve regurgitation to estimate systolic pulmonary   artery pressure.    There is a trivial to small circumferential pericardial effusion noted. 2/13/223 CTPA:  1. Acute bilateral pulmonary embolism is confirmed. 2. No significant right ventricular strain. 3. 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 Pulmonary follow-up is recommended. 4. Mediastinal and bilateral hilar lymphadenopathy. 5. Multiple small pulmonary nodules bilaterally. Pulmonary metastasis cannot   be excluded. 6. Suspected malignant left pleural effusion. 7. Suspected hepatic metastasis. 8. Mosaic attenuation throughout the lungs likely in association with known   pulmonary emboli. 9. Small pericardial effusion noted incidentally. Findings were discussed with the emergency room clinician at 1:15 pm on   2/13/2023.     2/13/23 LE doppler (Venous)  Extensive acute deep vein thrombosis involving the left femoral vein, popliteal vein, gastrocnemius veins, posterior tibial,  peroneal and soleal veins. 2/13/23 CXR:  1. Prominence of the left superior mediastinal border. It is unclear if   this represents a paramediastinal mass or lymphadenopathy. Recommend further   evaluation with a CT chest as there are no prior studies for comparison. 2.  Diffuse airspace infiltrates. These are indeterminate this could   represent pneumonia or edema.      Telemetry: Sinus rhythm  (Personally reviewed)    Assessment/Plan:    1) Elevated troponin  -secondary to acute bilateral PE's and demand ischemia  -relatively flat and doubt an acute coronary syndrome  -continue ASA and statin  -Echo with preserved LV function and wall motion  -no plans for further cardiac evaluation at this time.   -will follow up as an outpatient and consider nuclear stress testing once patient has recovered    2) Bilateral pulmonary emboli/DVT  -remains on heparin  -change to DOAC prior to discharge (once invasive evaluation complete)  -echo with reduced RV function    3) Pericardial effusion  -trivial to small on TTE  -no evidence of tamponade  -possibly malignant in setting of underlying lung mass vs inflammatory given COVID    4) Lung mass with possible hepatic mets  -pulmonology and oncology following  -liver biopsy planned for later this week    5) Acute hypoxic respiratory failure  -continue supplemental O2 as needed to keep O2 > 90%    6) COVID-19 infection  -Rx per Primary team    7) L pleural effusion  -hold off on diuresis  -possibly malignant in etiology    No active cardiac issues at this point. Will no longer follow as an inpatient and can arrange for outpatient follow up in 4-6 weeks (Dr. Fernando Dahl). Thank you for allowing us to participate in Mrs. Cat's care.     Electronically signed by YANN Cardenas CNP on 2/15/2023 at 8:57 AM

## 2023-02-15 NOTE — ONCOLOGY
ONCOLOGY HEMATOLOGY CARE PROGRESS NOTE      SUBJECTIVE:    Afebrile and on 5 LPM by NC. Remains on UFH. Has rib pain along the inframammary fold. Better with APAP. ROS:     Constitutional:  No weight loss, No fever, No chills, No night sweats. Energy level good.   Eyes:  No impairment or change in vision  ENT / Mouth:  No pain, abnormal ulceration, bleeding, nasal drip or change in voice or hearing  Cardiovascular:  No chest pain, palpitations, new edema, or calf discomfort  Respiratory:  No pain, hemoptysis, change to breathing  Breast:  No pain, discharge, change in appearance or texture  Gastrointestinal:  No pain, cramping, jaundice, change to eating and bowel habits  Urinary:  No pain, bleeding or change in continence  Genitalia: No pain, bleeding or discharge  Musculoskeletal:  No redness, pain, edema or weakness  Skin:  No pruritus, rash, change to nodules or lesions  Neurologic:  No discomfort, change in mental status, speech, sensory or motor activity  Psychiatric:  No change in concentration or change to affect or mood  Endocrine:  No hot flashes, increased thirst, or change to urine production  Hematologic: No petechiae, ecchymosis or bleeding  Lymphatic:  No lymphadenopathy or lymphedema  Allergy / Immunologic:  No eczema, hives, frequent or recurrent infections    OBJECTIVE        Physical    VITALS:  Patient Vitals for the past 24 hrs:   BP Temp Temp src Pulse Resp SpO2 Weight   02/15/23 0515 (!) 143/67 97.9 °F (36.6 °C) Oral 97 16 91 % --   02/15/23 0039 -- -- -- -- -- -- 194 lb 1.6 oz (88 kg)   02/14/23 2238 (!) 144/80 97.7 °F (36.5 °C) Oral 100 18 93 % --   02/14/23 2045 122/76 98.1 °F (36.7 °C) Oral (!) 108 18 91 % --   02/14/23 1600 117/76 98.1 °F (36.7 °C) Oral (!) 112 18 91 % --   02/14/23 1200 (!) 161/88 98.2 °F (36.8 °C) Oral (!) 107 18 93 % --   02/14/23 0800 131/81 98.2 °F (36.8 °C) Oral (!) 111 18 92 % --       24HR INTAKE/OUTPUT:    Intake/Output Summary (Last 24 hours) at 2/15/2023 0703  Last data filed at 2/15/2023 0541  Gross per 24 hour   Intake 240 ml   Output 2700 ml   Net -2460 ml       CONSTITUTIONAL: awake, alert, cooperative, no apparent distress   EYES: pupils equal, round and reactive to light, sclera clear and conjunctiva normal  ENT: Normocephalic, without obvious abnormality, atraumatic  NECK: supple, symmetrical, no jugular venous distension and no carotid bruits   HEMATOLOGIC/LYMPHATIC: no cervical, supraclavicular or axillary lymphadenopathy   LUNGS: no increased work of breathing and clear to auscultation   CARDIOVASCULAR: regular rate and rhythm, normal S1 and S2, no murmur noted  ABDOMEN: normal bowel sounds x 4, soft, non-distended, non-tender, no masses palpated, no hepatosplenomgaly   MUSCULOSKELETAL: full range of motion noted, tone is normal  NEUROLOGIC: awake, alert, oriented to name, place and time. Motor skills grossly intact. SKIN: Normal skin color, texture, turgor and no jaundice.  appears intact   EXTREMITIES: no LE edema     DATA:  CBC:    Recent Labs     02/15/23  0601 02/14/23  0357 02/13/23  1112   WBC 14.1* 17.3* 17.8*   NEUTROABS 11.2* 13.6* 16.4*   LYMPHOPCT 12.7 12.9 4.7   RBC 3.66* 4.05 4.69   HGB 10.3* 11.2* 12.9   HCT 31.4* 34.1* 39.7   MCV 85.7 84.2 84.6   MCH 28.1 27.6 27.5   MCHC 32.7 32.7 32.4   RDW 17.4* 17.7* 17.3*    150 133*       PT/INR:    Recent Labs     02/13/23  1112   PROT 7.2     PTT:    Recent Labs     02/13/23  1305   APTT 26.5       CMP:    Recent Labs     02/14/23  0357 02/13/23  1112    141   K 3.4* 3.3*    101   CO2 23 24   GLUCOSE 97 131*   BUN 20 22*   CREATININE 0.7 0.8   LABGLOM >60 >60   CALCIUM 9.1 10.4   PROT  --  7.2   LABALBU  --  4.0   AGRATIO  --  1.3   BILITOT  --  0.9   ALKPHOS  --  351*   ALT  --  21   AST  --  34   MG 2.30  --        Lab Results   Component Value Date    CALCIUM 9.1 02/14/2023       LDH:No results for input(s): LDH in the last 720 hours. Radiology Review:  VL Extremity Venous Left  Lower Extremities DVT Study     Demographics      Patient Name      Sherrie Barnes      Date of Study     02/13/2023          Gender              Female      Patient Number    [id number]          Date of Birth       1955      Visit Number      [id number]           Age                 79 year(s)      Accession Number  [id number]          Room Number         07      Corporate ID      [id number]           [address] [facility]   Physician                             Physician           Readers                                                             Aleah Serrano MD     Procedure    Type of Study:      Veins:Lower Extremities DVT Study, VL EXTREMITY VENOUS DUPLEX LEFT. Vascular Sonographer Report     Indications for Study:Swelling and Leg pain. Additional Indications:Patient scanned bedside in ED with complaints of left  lower extremity swelling for 1 week with shortness of breath. Patient noted  pain behind knee over the past two days. Recent travel where patient was  sedentary for over 8 hours. Venous Duplex Scan: B-mode imaging of the deep and superficial veins, with  compression maneuvers, including color and Doppler spectral waveform analysis. Impressions  Right Impression  There is no evidence of deep venous thrombosis involving the right common  femoral vein. Left Impression  Acute totally occluding deep vein thrombosis involving the left femoral vein  at the distal thigh, popliteal vein, gastrocnemius veins (4 of 4), posterior  tibial, peroneal and soleal veins. No other evidence of deep vein or superficial vein thrombosis involving the  left lower extremity.   There is no previous exam for comparison. Conclusions      Summary      Extensive acute deep vein thrombosis involving the left femoral vein,   popliteal vein, gastrocnemius veins, posterior tibial, peroneal and soleal   veins. Signature      ------------------------------------------------------------------   Electronically signed by Deepa Hill MD (Interpreting   physician) on 02/13/2023 at 04:27 PM   ------------------------------------------------------------------     Patient Status:ER. Study [facility]. Technical Quality:Adequate visualization.      - Results were reported to:Ethan Cheryln Hammans, RN @ 12:23pm on 2/13/23. Velocities are measured in cm/s ; Diameters are measured in mm    Right Lower Extremities DVT Study Measurements  Right 2D Measurements  +--------------+----------+---------------+----------+  ! Location      ! Visualized! Compressibility! Thrombosis! +--------------+----------+---------------+----------+  ! Common Femoral!Yes       ! Yes            ! None      !  +--------------+----------+---------------+----------+    Right Doppler Measurements  +--------------+------+------+------------+  ! Location      ! Signal!Reflux! Reflux (sec)! +--------------+------+------+------------+  ! Common Femoral!Phasic! No    !            !  +--------------+------+------+------------+    Left Lower Extremities DVT Study Measurements  Left 2D Measurements  +------------------------+----------+---------------+----------+  ! Location                ! Visualized! Compressibility! Thrombosis! +------------------------+----------+---------------+----------+  ! Sapheno Femoral Junction! Yes       ! Yes            ! None      !  +------------------------+----------+---------------+----------+  ! GSV Thigh               ! Yes       ! Yes            ! None      !  +------------------------+----------+---------------+----------+  ! Common Femoral          !Yes       ! Yes            ! None !  +------------------------+----------+---------------+----------+  ! Femoral                 !Yes       ! Yes            ! None      !  +------------------------+----------+---------------+----------+  ! Prox Femoral            !Yes       ! Yes            ! None      !  +------------------------+----------+---------------+----------+  ! Mid Femoral             !Yes       ! Yes            ! None      !  +------------------------+----------+---------------+----------+  ! Dist Femoral            !Yes       ! No             !Acute     !  +------------------------+----------+---------------+----------+  ! Deep Femoral            !Yes       ! Yes            ! None      !  +------------------------+----------+---------------+----------+  ! Popliteal               !Yes       ! No             !Acute     !  +------------------------+----------+---------------+----------+  ! GSV Below Knee          ! Yes       ! Yes            ! None      !  +------------------------+----------+---------------+----------+  ! Gastroc                 ! Yes       ! No             !Acute     !  +------------------------+----------+---------------+----------+  ! Soleal                  !Yes       ! No             !Acute     !  +------------------------+----------+---------------+----------+  ! PTV                     ! Yes       ! No             !Acute     !  +------------------------+----------+---------------+----------+  ! Peroneal                !Yes       ! No             !Acute     !  +------------------------+----------+---------------+----------+  ! GSV Calf                ! Yes       ! Yes            ! None      !  +------------------------+----------+---------------+----------+  ! SSV                     ! Yes       ! Yes            ! None      !  +------------------------+----------+---------------+----------+    Left Doppler Measurements  +------------------------+------+------+------------+  ! Location                ! Signal!Reflux! Reflux (sec)!  +------------------------+------+------+------------+  ! Sapheno Femoral Junction! Phasic! No    !            !  +------------------------+------+------+------------+  ! Common Femoral          !Phasic! No    !            !  +------------------------+------+------+------------+  ! Femoral                 !Phasic! No    !            !  +------------------------+------+------+------------+  ! Dist Femoral            !Absent! No    !            !  +------------------------+------+------+------------+  ! Deep Femoral            !Phasic! No    !            !  +------------------------+------+------+------------+  ! Popliteal               !Absent! No    !            !  +------------------------+------+------+------------+  CT CHEST PULMONARY EMBOLISM W CONTRAST  Narrative: EXAMINATION:  CTA OF THE CHEST 2/13/2023 12:19 pm    TECHNIQUE:  CTA of the chest was performed after the administration of intravenous  contrast.  Multiplanar reformatted images are provided for review. MIP  images are provided for review. Automated exposure control, iterative  reconstruction, and/or weight based adjustment of the mA/kV was utilized to  reduce the radiation dose to as low as reasonably achievable. COMPARISON:  None. HISTORY:  ORDERING SYSTEM PROVIDED HISTORY: sob/tachy/abnl cxr  TECHNOLOGIST PROVIDED HISTORY:  Reason for exam:->sob/tachy/abnl cxr  Decision Support Exception - unselect if not a suspected or confirmed  emergency medical condition->Emergency Medical Condition (MA)  Reason for Exam: chest pain in LT lower anterior chest, SOB,  dry cough ,  dizziness symptoms x 4-6 weeks but gerttting worse. pt did test (+) for  COVID today   pt has had COVID one previous time with no lasting complications  Relevant Medical/Surgical History: HTN,  GB removed    FINDINGS:  Pulmonary Arteries: The study is of good technical quality. Acute bilateral  pulmonary embolism is confirmed.   A filling defect is observed within the  left upper lobar pulmonary artery. Filling defect also demonstrated within  posterior segmental branches of the right lower lobar pulmonary artery. Poor  characterization of the right upper lobar pulmonary artery due to dense beam  hardening artifact within the SVC. However, there does appear to be a  filling defect within an anterior segmental branch of the right upper lobar  pulmonary artery as well. RV: LV ratio 1.2. Mediastinum: Mild cardiomegaly. Small pericardial effusion. Normal aorta. Thyroid and esophagus normal.  Extensive mediastinal and hilar  lymphadenopathy. Index right paratracheal lymph node measures 13 mm. Lungs/pleura: The airways are patent. Small left pleural effusion. There is  a mass in the left pulmonary hilum extending into the upper lobe. Best  approximate measurements 3.5 x 3.5 cm. Of note, this mass appears to cross  the major fissure involving the superior segment of the left lower lobe as  well and insinuates within the left pulmonary hilum. Additional solid  pulmonary nodules are identified in the bilateral lower lobes and right upper  lobe. An index nodule in the right upper lobe measures 7 x 5 mm. Mosaic  pattern of ground-glass opacification in the lungs diffusely. Interlobular  septal thickening is also demonstrated. Upper Abdomen: Multiple low attenuating masses are demonstrated within the  liver. These cannot be characterized on this angiographic study of the  chest.  The largest nodule in the right hepatic lobe measures 3.2 cm. Soft Tissues/Bones: No skeletal abnormalities throughout the chest.  Impression: 1. Acute bilateral pulmonary embolism is confirmed. 2. No significant right ventricular strain. 3. 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 Pulmonary follow-up is recommended. 4. Mediastinal and bilateral hilar lymphadenopathy. 5. Multiple small pulmonary nodules bilaterally.   Pulmonary metastasis cannot  be excluded. 6. Suspected malignant left pleural effusion. 7. Suspected hepatic metastasis. 8. Mosaic attenuation throughout the lungs likely in association with known  pulmonary emboli. 9. Small pericardial effusion noted incidentally. Findings were discussed with the emergency room clinician at 1:15 pm on  2/13/2023. XR CHEST PORTABLE  Narrative: EXAMINATION:  ONE XRAY VIEW OF THE CHEST    2/13/2023 10:50 am    COMPARISON:  None. HISTORY:  ORDERING SYSTEM PROVIDED HISTORY: sob  TECHNOLOGIST PROVIDED HISTORY:  Reason for exam:->sob  Reason for Exam: sob    FINDINGS:  Diffuse airspace infiltrates. Prominence of the left superior mediastinal  border. No pneumothorax or pleural effusion  Impression: 1. Prominence of the left superior mediastinal border. It is unclear if  this represents a paramediastinal mass or lymphadenopathy. Recommend further  evaluation with a CT chest as there are no prior studies for comparison. 2.  Diffuse airspace infiltrates. These are indeterminate this could  represent pneumonia or edema. Problem List  Patient Active Problem List   Diagnosis    Acute respiratory failure (HCC)    Acute deep vein thrombosis (DVT) of proximal vein of left lower extremity (HCC)    Bilateral pulmonary embolism (HCC)    Troponin level elevated    Deep venous thrombosis (HCC)    Pericardial effusion    Pleural effusion       ASSESSMENT AND PLAN:    1.) Left lung mass  - CTPA, 2/13/2023, showed a 3.5 x 3.5 cm left hilar mass, left pleural effusion, multiple liver mets. Bone mets seemed likely. - Would think we could hold heparin and biopsy the liver once her hypoxemic resp failure is improved. - Tentatively scheduled for Friday. 2.) PE  - CTPA, 2/13/2023, showed bilateral PEs.  - On UFH. Stay on this for now.     3.) SARS-CoV-2  - SARS-CoV-2 PCR positive, 2/13/2023.  - CTPA, 2/13/2023, showed bilateral diffuse patchy GGO.  - She is on 5 LPM by NC (stable).    - Started Dex, 2/14/2023. - Ceftriaxone/Azithro for potential bacterial superinfection.   - Will discuss with pulmonologist.     4.) Essential hypertension     5.) Hyperlipidemia     6.) Hypothyroidism        ONCOLOGIC DISPOSITION:      Shruti Anderson MD  Please contact through [address]

## 2023-02-15 NOTE — PROGRESS NOTES
Assessment complete and documented. VSS. Pt A&Ox4. Pt denies pain at this time. Safety precautions in place. Bed locked, alarmed, and in lowest position. Call light and bedside table within reach. Droplet Plus isolation active. Will continue to monitor.

## 2023-02-15 NOTE — ACP (ADVANCE CARE PLANNING)
Advance Care Planning     General Advance Care Planning (ACP) Conversation    Date of Conversation: 2/13/2023  Conducted with: Patient with Decision Making Capacity    Healthcare Decision Maker:    Primary Decision Maker: Tadeo Hernadez - Spouse - [phone]  Click here to complete Healthcare Decision Makers including selection of the Healthcare Decision Maker Relationship (ie \"Primary\"). Today we documented Decision Maker(s) consistent with Legal Next of Kin hierarchy.     Content/Action Overview:  Has NO ACP documents/care preferences - requested patient complete ACP documents  Reviewed DNR/DNI and patient elects Full Code (Attempt Resuscitation)        Length of Voluntary ACP Conversation in minutes:  <16 minutes (Non-Billable)    Analia Medina RN

## 2023-02-15 NOTE — PROGRESS NOTES
Vascular    CTA Chest reviewed- very little thrombus in very distal pulmonary arteries. Doubt this is contributing to shortness of breath. No intervention indicated other than anticoagulation.

## 2023-02-15 NOTE — CARE COORDINATION
Chart reviewed. Acute respiratory failure, COVID, DVT, PE, Left lung mass, multiple liver mets, bone mets possible. Management per cardiology, pulmonology, HEMONC and IM. IV ABX, IV steroids. Plan liver biopsy, tentatively scheduled for Friday. Pt from home with . Lives in [place] and recently went on mission trip to [facility]). Pt and  have being staying here with daughter and son-in-law. IPTA. Currently on 5 liters oxygen, none at baseline. Denies dcp needs. CM will follow for O2 needs.  Analia Aponte RN

## 2023-02-15 NOTE — CONSULTS
Consult placed    Who: Dr. Peg Dean  Date:2/15/2023,  Time:8:10 AM        Electronically signed by Tk Stewart on 2/15/2023 at 8:10 AM

## 2023-02-15 NOTE — PROGRESS NOTES
Hospitalist Progress Note      PCP: YANN Nowak - CNP    Date of Admission: 2/13/2023         Chief Complaint: SOB      Hospital Course: reviewed      Subjective:  still notes sob, on 5-6L and stable from yesterday,  at bedside       Medications:  Reviewed    Infusion Medications    heparin (PORCINE) Infusion 1,130 Units/hr (02/14/23 1215)    sodium chloride 1,000 mL (02/15/23 0751)    sodium chloride       Scheduled Medications    losartan  50 mg Oral BID    And    hydroCHLOROthiazide  12.5 mg Oral Daily    pantoprazole  40 mg Oral QAM AC    dexamethasone  6 mg IntraVENous Q24H    aspirin  81 mg Oral Daily    melatonin  5 mg Oral Nightly    atorvastatin  40 mg Oral Nightly    thyroid  30 mg Oral Once per day on Mon Wed Fri    thyroid  60 mg Oral Once per day on Sun Tue Thu Sat    sodium chloride flush  5-40 mL IntraVENous 2 times per day    cefTRIAXone (ROCEPHIN) IV  1,000 mg IntraVENous Q24H    azithromycin  250 mg IntraVENous Q24H     PRN Meds: diphenhydrAMINE, heparin (porcine), heparin (porcine), sodium chloride flush, sodium chloride, ondansetron **OR** ondansetron, polyethylene glycol, acetaminophen **OR** acetaminophen, perflutren lipid microspheres, ipratropium-albuterol      Intake/Output Summary (Last 24 hours) at 2/15/2023 0928  Last data filed at 2/15/2023 0541  Gross per 24 hour   Intake 240 ml   Output 2700 ml   Net -2460 ml       Physical Exam Performed:    /88   Pulse 97   Temp 97.9 °F (36.6 °C) (Oral)   Resp 16   Ht 5' 1\" (1.549 m)   Wt 194 lb 1.6 oz (88 kg)   SpO2 93%   BMI 36.67 kg/m²     General appearance: No apparent distress, appears stated age and cooperative. HEENT: Pupils equal, round, and reactive to light. Conjunctivae/corneas clear. Neck: Supple, with full range of motion. No jugular venous distention. Trachea midline. Respiratory:  inc'd respiratory effort. Clear to auscultation, bilaterally without Wheezes/Rhonchi.  Noted scattered fine rales seem less today  Cardiovascular: Regular rate and rhythm with normal S1/S2 without murmurs, rubs or gallops. Abdomen: Soft, non-tender, non-distended with normal bowel sounds. Musculoskeletal: No clubbing, cyanosis or edema bilaterally. Full range of motion without deformity. Skin: Skin color, texture, turgor normal.  No rashes or lesions. Neurologic:  Neurovascularly intact without any focal sensory/motor deficits. Cranial nerves: II-XII intact, grossly non-focal.  Psychiatric: Alert and oriented, thought content appropriate, normal insight  Capillary Refill: Brisk, 3 seconds, normal   Peripheral Pulses: +2 palpable, equal bilaterally        Labs:   Recent Labs     02/13/23  1112 02/14/23  0357 02/15/23  0601   WBC 17.8* 17.3* 14.1*   HGB 12.9 11.2* 10.3*   HCT 39.7 34.1* 31.4*   * 150 157     Recent Labs     02/13/23  1112 02/14/23  0357 02/15/23  0601    141 144   K 3.3* 3.4* 3.3*    106 110   CO2 24 23 21   BUN 22* 20 15   CREATININE 0.8 0.7 0.7   CALCIUM 10.4 9.1 9.0     Recent Labs     02/13/23  1112   AST 34   ALT 21   BILITOT 0.9   ALKPHOS 351*     Recent Labs     02/15/23  0601   INR 1.16*     Recent Labs     02/14/23  0136 02/14/23  0357 02/14/23  0836   TROPONINI 0.08* 0.08* 0.07*       Urinalysis:    No results found for: P[place], BACTERIA, RBCUA, BLOODU, SPECGRAV, GLUCOSEU    Radiology:  CT CHEST PULMONARY EMBOLISM W CONTRAST   Final Result   1. Acute bilateral pulmonary embolism is confirmed. 2. No significant right ventricular strain. 3. 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 Pulmonary follow-up is recommended. 4. Mediastinal and bilateral hilar lymphadenopathy. 5. Multiple small pulmonary nodules bilaterally. Pulmonary metastasis cannot   be excluded. 6. Suspected malignant left pleural effusion. 7. Suspected hepatic metastasis.    8. Mosaic attenuation throughout the lungs likely in association with known pulmonary emboli. 9. Small pericardial effusion noted incidentally. Findings were discussed with the emergency room clinician at 1:15 pm on   2/13/2023. VL Extremity Venous Left   Final Result      XR CHEST PORTABLE   Final Result   1. Prominence of the left superior mediastinal border. It is unclear if   this represents a paramediastinal mass or lymphadenopathy. Recommend further   evaluation with a CT chest as there are no prior studies for comparison. 2.  Diffuse airspace infiltrates. These are indeterminate this could   represent pneumonia or edema.          IR GUIDED IVC FILTER PLACEMENT    (Results Pending)       IP CONSULT TO CARDIOLOGY  IP CONSULT TO PULMONOLOGY  IP CONSULT TO ONCOLOGY  IP CONSULT TO VASCULAR SURGERY    Assessment/Plan:    Active Hospital Problems    Diagnosis     Acute deep vein thrombosis (DVT) of proximal vein of left lower extremity (HCC) [I82.4Y2]      Priority: Medium    Bilateral pulmonary embolism (HCC) [I26.99]      Priority: Medium    Troponin level elevated [R77.8]      Priority: Medium    Deep venous thrombosis (HCC) [I82.409]      Priority: Medium    Pericardial effusion [I31.39]      Priority: Medium    Pleural effusion [J90]      Priority: Medium    Acute respiratory failure (HCC) [J96.00]      Priority: Medium       Acute hypoxic resp failure- likely multifactorial(pna, PE, ?new lung mass)  -tx underlying isuses  -supp ox, weaned as tolerated  -pulm consulted, apprec recs   -iv dexamethasone started 2/14 for covid    Abn CT- with lung mass/suspected liver mets  -hemonc consulted/pulm consulted  -IR biopsy planned 2/17     Possible pna- ?related to COVID infection, pt is vaccinated with Moderna(4 shots)  -Iv rocephin/azithro continued  -precautions  -Bc pending  -Checked procal and wnl   -vit c/zinc started  -check vit d level, ldh/crp/esr  -started on iv steroids    VTE- with new PE and DVT, POA  -heparin ggt started  -vasc surg consulted by selena, CTPA reviewed, no intervention needed at this time given PE in distal arteries     Elevated BNP/troponin - likely from rt heart strain, related to above  -Asa given  -Echo done(ef 56%, rv pressure overload, rv systolic fcn dec'd, trival circum pericard effusion)  -Cards consulted  -trended angel     Htn-held hctz/arb for now     Hld- on statin     Thyroid- on armour home dosing     Gerd- on ppi        DVT Prophylaxis: heparin ggt  Diet: ADULT DIET;  Regular  Code Status: Full Code  PT/OT Eval Status: not ordered    Dispo - pending IR biopsy 2/17, pulm/onc recs    Appropriate for A1 Discharge Unit: No      Abdirashid Kumar MD

## 2023-02-15 NOTE — PROGRESS NOTES
INPATIENT PULMONARY CRITICAL CARE PROGRESS NOTE      Reason for visit: Acute hypoxemic respiratory failure, COVID-19 infection with likely COVID-19 pneumonia, left lung mass, mediastinal and hilar adenopathy, left pleural effusion, extensive left leg DVT, PE.    SUBJECTIVE: The patient is feeling slightly better, her  is at her bedside feeding her. She remains afebrile and hemodynamically stable. She is in no respiratory distress. She is on oxygen at 5 LPM with SaO2 93%. She still complains of left-sided pleuritic chest pain, shortness of breath is better. Appetite is diminished. She denies other complaints. Physical Exam:  Blood pressure 137/88, pulse 97, temperature 97.9 °F (36.6 °C), temperature source Oral, resp. rate 16, height 5' 1\" (1.549 m), weight 194 lb 1.6 oz (88 kg), SpO2 93 %.'   Constitutional: Very pleasant, overweight, on supplemental oxygen but in no acute distress. HENT:  Oropharynx is clear and moist.  Class III airway, dental repair  Eyes:  Conjunctivae are normal. No scleral icterus. Glasses  Neck: Relatively short neck. No JVD  Cardiovascular: Sinus tachycardia, normal heart sounds. No lower extremity edema. Pulmonary/Chest: No wheezes. No rales. No accessory muscle usage or stridor. Abdominal: Deferred. Musculoskeletal: No cyanosis. No clubbing. No obvious joint deformity. Lymphadenopathy: Deferred    Skin: Skin is warm and dry. No rash or nodules on the exposed extremities. Psychiatric: Normal mood and affect. Behavior is normal.  No anxiety. Neurologic: Alert, awake and oriented. PERRL. Speech fluent.   No obvious neurologic deficit, detailed exam not performed       Results:  CBC:   Recent Labs     02/13/23  1112 02/14/23  0357 02/15/23  0601   WBC 17.8* 17.3* 14.1*   HGB 12.9 11.2* 10.3*   HCT 39.7 34.1* 31.4*   MCV 84.6 84.2 85.7   * 150 157     BMP:   Recent Labs     02/13/23  1112 02/14/23  0357 02/15/23  0601    141 144   K 3.3* 3.4* 3.3*  106 110   CO2 24 23 21   BUN 22* 20 15   CREATININE 0.8 0.7 0.7     LIVER PROFILE:   Recent Labs     02/13/23  1112   AST 34   ALT 21   BILITOT 0.9   ALKPHOS 351*     PT/INR:   Recent Labs     02/15/23  0601   PROTIME 14.8*   INR 1.16*     APTT:   Recent Labs     02/13/23  1305   APTT 26.5       Imaging:  I have reviewed radiology images personally. CT CHEST PULMONARY EMBOLISM W CONTRAST   Final Result   1. Acute bilateral pulmonary embolism is confirmed. 2. No significant right ventricular strain. 3. 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 Pulmonary follow-up is recommended. 4. Mediastinal and bilateral hilar lymphadenopathy. 5. Multiple small pulmonary nodules bilaterally. Pulmonary metastasis cannot   be excluded. 6. Suspected malignant left pleural effusion. 7. Suspected hepatic metastasis. 8. Mosaic attenuation throughout the lungs likely in association with known   pulmonary emboli. 9. Small pericardial effusion noted incidentally. Findings were discussed with the emergency room clinician at 1:15 pm on   2/13/2023. VL Extremity Venous Left   Final Result      XR CHEST PORTABLE   Final Result   1. Prominence of the left superior mediastinal border. It is unclear if   this represents a paramediastinal mass or lymphadenopathy. Recommend further   evaluation with a CT chest as there are no prior studies for comparison. 2.  Diffuse airspace infiltrates. These are indeterminate this could   represent pneumonia or edema.          IR GUIDED IVC FILTER PLACEMENT    (Results Pending)     VL Extremity Venous Left    Result Date: 2/13/2023  Lower Extremities DVT Study  Demographics   Patient Name      Rand Hardy   Date of Study     02/13/2023          Gender              Female   Patient Number    [id number]          Date of Birth       1955   Visit Number      [id number]           Age                 79 year(s)   Accession Number  [id number]          Room Number         07   Corporate ID      [id number]           [address],                                                            [address]  Interpreting        [facility]  Physician                             Physician           Readers                                                            Acacia Ocampo MD  Procedure Type of Study:   Veins:Lower Extremities DVT Study, VL EXTREMITY VENOUS DUPLEX LEFT. Vascular Sonographer Report  Indications for Study:Swelling and Leg pain. Additional Indications:Patient scanned bedside in ED with complaints of left lower extremity swelling for 1 week with shortness of breath. Patient noted pain behind knee over the past two days. Recent travel where patient was sedentary for over 8 hours. Venous Duplex Scan: B-mode imaging of the deep and superficial veins, with compression maneuvers, including color and Doppler spectral waveform analysis. Impressions Right Impression There is no evidence of deep venous thrombosis involving the right common femoral vein. Left Impression Acute totally occluding deep vein thrombosis involving the left femoral vein at the distal thigh, popliteal vein, gastrocnemius veins (4 of 4), posterior tibial, peroneal and soleal veins. No other evidence of deep vein or superficial vein thrombosis involving the left lower extremity. There is no previous exam for comparison. Conclusions   Summary   Extensive acute deep vein thrombosis involving the left femoral vein,  popliteal vein, gastrocnemius veins, posterior tibial, peroneal and soleal  veins.    Signature   ------------------------------------------------------------------  Electronically signed by Acacia Ocampo MD (Interpreting  physician) on 02/13/2023 at 04:27 PM ------------------------------------------------------------------  Patient   XR CHEST PORTABLE    Result Date: 2/13/2023  EXAMINATION: ONE XRAY VIEW OF THE CHEST 2/13/2023 10:50 am COMPARISON: None. HISTORY: ORDERING SYSTEM PROVIDED HISTORY: sob TECHNOLOGIST PROVIDED HISTORY: Reason for exam:->sob Reason for Exam: sob FINDINGS: Diffuse airspace infiltrates. Prominence of the left superior mediastinal border. No pneumothorax or pleural effusion     1. Prominence of the left superior mediastinal border. It is unclear if this represents a paramediastinal mass or lymphadenopathy. Recommend further evaluation with a CT chest as there are no prior studies for comparison. 2.  Diffuse airspace infiltrates. These are indeterminate this could represent pneumonia or edema. CT CHEST PULMONARY EMBOLISM W CONTRAST    Result Date: 2/13/2023  EXAMINATION: CTA OF THE CHEST 2/13/2023 12:19 pm TECHNIQUE: CTA of the chest was performed after the administration of intravenous contrast.  Multiplanar reformatted images are provided for review. MIP images are provided for review. Automated exposure control, iterative reconstruction, and/or weight based adjustment of the mA/kV was utilized to reduce the radiation dose to as low as reasonably achievable. COMPARISON: None. HISTORY: ORDERING SYSTEM PROVIDED HISTORY: sob/tachy/abnl cxr TECHNOLOGIST PROVIDED HISTORY: Reason for exam:->sob/tachy/abnl cxr Decision Support Exception - unselect if not a suspected or confirmed emergency medical condition->Emergency Medical Condition (MA) Reason for Exam: chest pain in LT lower anterior chest, SOB,  dry cough , dizziness symptoms x 4-6 weeks but gerttting worse. pt did test (+) for COVID today   pt has had COVID one previous time with no lasting complications Relevant Medical/Surgical History: HTN,  GB removed FINDINGS: Pulmonary Arteries: The study is of good technical quality. Acute bilateral pulmonary embolism is confirmed.   A filling defect is observed within the left upper lobar pulmonary artery. Filling defect also demonstrated within posterior segmental branches of the right lower lobar pulmonary artery. Poor characterization of the right upper lobar pulmonary artery due to dense beam hardening artifact within the SVC. However, there does appear to be a filling defect within an anterior segmental branch of the right upper lobar pulmonary artery as well. RV: LV ratio 1.2. Mediastinum: Mild cardiomegaly. Small pericardial effusion. Normal aorta. Thyroid and esophagus normal.  Extensive mediastinal and hilar lymphadenopathy. Index right paratracheal lymph node measures 13 mm. Lungs/pleura: The airways are patent. Small left pleural effusion. There is a mass in the left pulmonary hilum extending into the upper lobe. Best approximate measurements 3.5 x 3.5 cm. Of note, this mass appears to cross the major fissure involving the superior segment of the left lower lobe as well and insinuates within the left pulmonary hilum. Additional solid pulmonary nodules are identified in the bilateral lower lobes and right upper lobe. An index nodule in the right upper lobe measures 7 x 5 mm. Mosaic pattern of ground-glass opacification in the lungs diffusely. Interlobular septal thickening is also demonstrated. Upper Abdomen: Multiple low attenuating masses are demonstrated within the liver. These cannot be characterized on this angiographic study of the chest.  The largest nodule in the right hepatic lobe measures 3.2 cm. Soft Tissues/Bones: No skeletal abnormalities throughout the chest.     1. Acute bilateral pulmonary embolism is confirmed. 2. No significant right ventricular strain. 3. Large mass centered in the left pulmonary hilum, suspected to represent bronchogenic carcinoma. Pulmonary follow-up is recommended. 4. Mediastinal and bilateral hilar lymphadenopathy. 5. Multiple small pulmonary nodules bilaterally.   Pulmonary metastasis cannot be excluded. 6. Suspected malignant left pleural effusion. 7. Suspected hepatic metastasis. 8. Mosaic attenuation throughout the lungs likely in association with known pulmonary emboli. 9. Small pericardial effusion noted incidentally. Findings were discussed with the emergency room clinician at 1:15 pm on 2/13/2023. Assessment and plan:  Acute respiratory failure, hypoxemic. Probably related to pneumonia process in the lungs, potentially due to COVID-19 infection. Oxygen to keep SaO2 >90%  Pulmonary embolism. On heparin, echocardiogram with septal flattening suggestive of RV pressure overload  DVT. Extensive down the left leg. On heparin. No obvious risk factors, could be hypercoagulable from likely underlying malignancy  Lung mass. Proximal mass with adenopathy, could represent small cell lung cancer. The patient is aware that this is a suspected diagnosis, was upset after discussed by Dr. Mason Hair this morning. She will be released with the patient and her , explained that this most likely is lung cancer  Hilar and mediastinal adenopathy. Could be a candidate for EBUS, but I believe the liver is a better target for biopsy  Pleural effusion. Likely malignant, small. Not sufficient to yield on cytology to make a definitive diagnosis in many patients  Liver lesions. Likely metastatic. Will discuss with IR about doing a biopsy under ultrasound  Pericardial effusion. Small, unclear etiology  COVID-19 infection, likely COVID 19 pneumonia. Usually mild course after immunization, the older vaccines would not protect against the newer strains. Agree with dexamethasone suggested by Dr. Mason Hair  Lactic acidosis. No suggestion of progressive sepsis  Leukocytosis. Empiric antibiotic for now. Bacterial pneumonia cannot be ruled out  Normochromic, normocytic anemia. Mild, follow H&H. Hypokalemia. Replace and monitor  Elevated proBNP. Imaging does not suggest CHF  Elevated troponin.   Likely nonspecific  DVT prophylaxis. On heparin      Discussed with patient, her  and nursing. Discussed. Oxygen Dr. Jana Nguyen. We had talked about placing cardiac removal IVC filter while anticoagulation was on hold for the liver biopsy. I received a call from IR that holding heparin for a few hours would be acceptable, she does not need an IVC filter. Patient and , Dr. Jana Nguyen informed.       Electronically signed by:  Jyoti Calabrese MD    2/15/2023    10:18 AM.

## 2023-02-16 ENCOUNTER — APPOINTMENT (OUTPATIENT)
Dept: GENERAL RADIOLOGY | Age: 68
DRG: 987 | End: 2023-02-16
Payer: COMMERCIAL

## 2023-02-16 LAB
ANION GAP SERPL CALCULATED.3IONS-SCNC: 11 MMOL/L (ref 3–16)
ANTI-XA UNFRAC HEPARIN: 0.48 IU/ML (ref 0.3–0.7)
BANDED NEUTROPHILS RELATIVE PERCENT: 2 % (ref 0–7)
BASOPHILS ABSOLUTE: 0 K/UL (ref 0–0.2)
BASOPHILS RELATIVE PERCENT: 0 %
BUN BLDV-MCNC: 17 MG/DL (ref 7–20)
CALCIUM SERPL-MCNC: 9.1 MG/DL (ref 8.3–10.6)
CHLORIDE BLD-SCNC: 112 MMOL/L (ref 99–110)
CO2: 22 MMOL/L (ref 21–32)
CREAT SERPL-MCNC: 0.7 MG/DL (ref 0.6–1.2)
EOSINOPHILS ABSOLUTE: 0.3 K/UL (ref 0–0.6)
EOSINOPHILS RELATIVE PERCENT: 2 %
GFR SERPL CREATININE-BSD FRML MDRD: >60 ML/MIN/{1.73_M2}
GLUCOSE BLD-MCNC: 96 MG/DL (ref 70–99)
HCT VFR BLD CALC: 31.3 % (ref 36–48)
HEMOGLOBIN: 10.3 G/DL (ref 12–16)
LYMPHOCYTES ABSOLUTE: 2.7 K/UL (ref 1–5.1)
LYMPHOCYTES RELATIVE PERCENT: 19 %
MAGNESIUM: 2.1 MG/DL (ref 1.8–2.4)
MCH RBC QN AUTO: 28.1 PG (ref 26–34)
MCHC RBC AUTO-ENTMCNC: 32.9 G/DL (ref 31–36)
MCV RBC AUTO: 85.4 FL (ref 80–100)
METAMYELOCYTES RELATIVE PERCENT: 2 %
MONOCYTES ABSOLUTE: 0.3 K/UL (ref 0–1.3)
MONOCYTES RELATIVE PERCENT: 2 %
NEUTROPHILS ABSOLUTE: 10.9 K/UL (ref 1.7–7.7)
NEUTROPHILS RELATIVE PERCENT: 73 %
NUCLEATED RED BLOOD CELLS: 1 /100 WBC
PDW BLD-RTO: 17.2 % (ref 12.4–15.4)
PLATELET # BLD: 186 K/UL (ref 135–450)
PLATELET SLIDE REVIEW: ADEQUATE
PMV BLD AUTO: 8.9 FL (ref 5–10.5)
POTASSIUM REFLEX MAGNESIUM: 3.5 MMOL/L (ref 3.5–5.1)
RBC # BLD: 3.66 M/UL (ref 4–5.2)
SLIDE REVIEW: ABNORMAL
SODIUM BLD-SCNC: 145 MMOL/L (ref 136–145)
VITAMIN D 25-HYDROXY: 41.2 NG/ML
WBC # BLD: 14.2 K/UL (ref 4–11)

## 2023-02-16 PROCEDURE — 83735 ASSAY OF MAGNESIUM: CPT

## 2023-02-16 PROCEDURE — 6370000000 HC RX 637 (ALT 250 FOR IP): Performed by: INTERNAL MEDICINE

## 2023-02-16 PROCEDURE — 6360000002 HC RX W HCPCS: Performed by: INTERNAL MEDICINE

## 2023-02-16 PROCEDURE — 71045 X-RAY EXAM CHEST 1 VIEW: CPT

## 2023-02-16 PROCEDURE — 82306 VITAMIN D 25 HYDROXY: CPT

## 2023-02-16 PROCEDURE — 36415 COLL VENOUS BLD VENIPUNCTURE: CPT

## 2023-02-16 PROCEDURE — 94761 N-INVAS EAR/PLS OXIMETRY MLT: CPT

## 2023-02-16 PROCEDURE — 80048 BASIC METABOLIC PNL TOTAL CA: CPT

## 2023-02-16 PROCEDURE — 1200000000 HC SEMI PRIVATE

## 2023-02-16 PROCEDURE — 85520 HEPARIN ASSAY: CPT

## 2023-02-16 PROCEDURE — 6370000000 HC RX 637 (ALT 250 FOR IP): Performed by: NURSE PRACTITIONER

## 2023-02-16 PROCEDURE — 2580000003 HC RX 258: Performed by: INTERNAL MEDICINE

## 2023-02-16 PROCEDURE — 85025 COMPLETE CBC W/AUTO DIFF WBC: CPT

## 2023-02-16 PROCEDURE — 2700000000 HC OXYGEN THERAPY PER DAY

## 2023-02-16 PROCEDURE — 99232 SBSQ HOSP IP/OBS MODERATE 35: CPT | Performed by: INTERNAL MEDICINE

## 2023-02-16 RX ORDER — AZITHROMYCIN 250 MG/1
250 TABLET, FILM COATED ORAL DAILY
Status: DISCONTINUED | OUTPATIENT
Start: 2023-02-16 | End: 2023-02-18

## 2023-02-16 RX ORDER — DOCUSATE SODIUM 100 MG/1
100 CAPSULE, LIQUID FILLED ORAL DAILY
Status: DISCONTINUED | OUTPATIENT
Start: 2023-02-16 | End: 2023-02-22 | Stop reason: HOSPADM

## 2023-02-16 RX ADMIN — HYDROCHLOROTHIAZIDE 12.5 MG: 12.5 CAPSULE ORAL at 08:57

## 2023-02-16 RX ADMIN — TRAMADOL HYDROCHLORIDE 50 MG: 50 TABLET, COATED ORAL at 06:18

## 2023-02-16 RX ADMIN — SODIUM CHLORIDE, PRESERVATIVE FREE 10 ML: 5 INJECTION INTRAVENOUS at 09:00

## 2023-02-16 RX ADMIN — TRAMADOL HYDROCHLORIDE 50 MG: 50 TABLET, COATED ORAL at 00:50

## 2023-02-16 RX ADMIN — TRAMADOL HYDROCHLORIDE 50 MG: 50 TABLET, COATED ORAL at 12:35

## 2023-02-16 RX ADMIN — SODIUM CHLORIDE 1000 ML: 9 INJECTION, SOLUTION INTRAVENOUS at 15:59

## 2023-02-16 RX ADMIN — Medication 50 MG: at 20:10

## 2023-02-16 RX ADMIN — Medication 5 MG: at 20:06

## 2023-02-16 RX ADMIN — DOCUSATE SODIUM 100 MG: 100 CAPSULE, LIQUID FILLED ORAL at 15:53

## 2023-02-16 RX ADMIN — ASPIRIN 81 MG 81 MG: 81 TABLET ORAL at 08:57

## 2023-02-16 RX ADMIN — CEFTRIAXONE SODIUM 1000 MG: 1 INJECTION, POWDER, FOR SOLUTION INTRAMUSCULAR; INTRAVENOUS at 15:53

## 2023-02-16 RX ADMIN — LOSARTAN POTASSIUM 50 MG: 25 TABLET, FILM COATED ORAL at 08:57

## 2023-02-16 RX ADMIN — LOSARTAN POTASSIUM 50 MG: 25 TABLET, FILM COATED ORAL at 20:06

## 2023-02-16 RX ADMIN — OXYCODONE HYDROCHLORIDE AND ACETAMINOPHEN 500 MG: 500 TABLET ORAL at 08:57

## 2023-02-16 RX ADMIN — ATORVASTATIN CALCIUM 40 MG: 40 TABLET, FILM COATED ORAL at 20:06

## 2023-02-16 RX ADMIN — ACETAMINOPHEN 650 MG: 325 TABLET ORAL at 10:48

## 2023-02-16 RX ADMIN — DEXAMETHASONE SODIUM PHOSPHATE 6 MG: 4 INJECTION, SOLUTION INTRAMUSCULAR; INTRAVENOUS at 10:06

## 2023-02-16 RX ADMIN — DIPHENHYDRAMINE HCL 25 MG: 25 TABLET ORAL at 20:07

## 2023-02-16 RX ADMIN — THYROID, PORCINE 60 MG: 30 TABLET ORAL at 08:57

## 2023-02-16 RX ADMIN — PANTOPRAZOLE SODIUM 40 MG: 40 TABLET, DELAYED RELEASE ORAL at 06:18

## 2023-02-16 RX ADMIN — AZITHROMYCIN MONOHYDRATE 250 MG: 250 TABLET ORAL at 15:53

## 2023-02-16 RX ADMIN — DIPHENHYDRAMINE HCL 25 MG: 25 TABLET ORAL at 00:50

## 2023-02-16 ASSESSMENT — PAIN DESCRIPTION - LOCATION
LOCATION: SHOULDER
LOCATION: CHEST;SHOULDER
LOCATION: SHOULDER

## 2023-02-16 ASSESSMENT — PAIN SCALES - GENERAL
PAINLEVEL_OUTOF10: 9
PAINLEVEL_OUTOF10: 4
PAINLEVEL_OUTOF10: 6
PAINLEVEL_OUTOF10: 7
PAINLEVEL_OUTOF10: 5

## 2023-02-16 ASSESSMENT — PAIN - FUNCTIONAL ASSESSMENT
PAIN_FUNCTIONAL_ASSESSMENT: ACTIVITIES ARE NOT PREVENTED
PAIN_FUNCTIONAL_ASSESSMENT: ACTIVITIES ARE NOT PREVENTED

## 2023-02-16 ASSESSMENT — PAIN DESCRIPTION - ORIENTATION
ORIENTATION: RIGHT
ORIENTATION: RIGHT;LOWER;LEFT

## 2023-02-16 ASSESSMENT — PAIN DESCRIPTION - DESCRIPTORS
DESCRIPTORS: ACHING
DESCRIPTORS: ACHING;SHARP

## 2023-02-16 NOTE — PROGRESS NOTES
Feed every 3-4 hours    Reviewed chart for liver biopsy tomorrow. Pt received ASA this morning. After reviewed with IR doctor. Will not be able to do liver biopsy on Friday. ASA needs held for 3 days.  Nurse made aware

## 2023-02-16 NOTE — PROGRESS NOTES
INPATIENT PULMONARY CRITICAL CARE PROGRESS NOTE      Reason for visit: Acute hypoxemic respiratory failure, COVID-19 infection with likely COVID-19 pneumonia, left lung mass, mediastinal and hilar adenopathy, left pleural effusion, extensive left leg DVT, PE.    SUBJECTIVE: The patient is feeling slightly better with regards to her respiratory status, but complaining of pain in the right scapular region and ongoing left pleuritic chest pain below her left breast. Her  is at her bedside. She remains afebrile and hemodynamically stable. She is in no respiratory distress. She is on oxygen at 5 LPM with SaO2 93%. Appetite is diminished, she has not had a bowel movement. She says she does not have bowel movements every day at home either. She denies other complaints. Physical Exam:  Blood pressure (!) 152/91, pulse 84, temperature 97.9 °F (36.6 °C), temperature source Oral, resp. rate 18, height 5' 1\" (1.549 m), weight 197 lb 4.8 oz (89.5 kg), SpO2 93 %.'   Constitutional: Very pleasant, overweight, on supplemental oxygen but in no acute distress. HENT:  Oropharynx is clear and moist.  Class III airway, dental repair  Eyes:  Conjunctivae are normal. No scleral icterus. Glasses  Neck: Relatively short neck. No JVD  Cardiovascular: Sinus rhythm, normal heart sounds. No lower extremity edema. Pulmonary/Chest: No wheezes. No rales. No accessory muscle usage or stridor. Abdominal: Deferred. Musculoskeletal: No cyanosis. No clubbing. No obvious joint deformity. Lymphadenopathy: Deferred    Skin: Skin is warm and dry. No rash or nodules on the exposed extremities. Psychiatric: Normal mood and affect. Behavior is normal.  No anxiety. Neurologic: Alert, awake and oriented. PERRL. Speech fluent.   No obvious neurologic deficit, detailed exam not performed       Results:  CBC:   Recent Labs     02/14/23  0357 02/15/23  0601 02/16/23  0637   WBC 17.3* 14.1* 14.2*   HGB 11.2* 10.3* 10.3*   HCT 34.1* 31.4* 31.3*   MCV 84.2 85.7 85.4    157 186       BMP:   Recent Labs     02/14/23  0357 02/15/23  0601 02/16/23  0636    144 145   K 3.4* 3.3* 3.5    110 112*   CO2 23 21 22   BUN 20 15 17   CREATININE 0.7 0.7 0.7       PT/INR:   Recent Labs     02/15/23  0601   PROTIME 14.8*   INR 1.16*       APTT:   Recent Labs     02/13/23  1305   APTT 26.5         Imaging:  I have reviewed radiology images personally. CT CHEST PULMONARY EMBOLISM W CONTRAST   Final Result   1. Acute bilateral pulmonary embolism is confirmed. 2. No significant right ventricular strain. 3. 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 Pulmonary follow-up is recommended. 4. Mediastinal and bilateral hilar lymphadenopathy. 5. Multiple small pulmonary nodules bilaterally. Pulmonary metastasis cannot   be excluded. 6. Suspected malignant left pleural effusion. 7. Suspected hepatic metastasis. 8. Mosaic attenuation throughout the lungs likely in association with known   pulmonary emboli. 9. Small pericardial effusion noted incidentally. Findings were discussed with the emergency room clinician at 1:15 pm on   2/13/2023. VL Extremity Venous Left   Final Result      XR CHEST PORTABLE   Final Result   1. Prominence of the left superior mediastinal border. It is unclear if   this represents a paramediastinal mass or lymphadenopathy. Recommend further   evaluation with a CT chest as there are no prior studies for comparison. 2.  Diffuse airspace infiltrates. These are indeterminate this could   represent pneumonia or edema.          US BIOPSY LIVER PERCUTANEOUS    (Results Pending)     VL Extremity Venous Left    Result Date: 2/13/2023  Lower Extremities DVT Study  Demographics   Patient Name      Xena Cueva   Date of Study     02/13/2023          Gender              Female   Patient Number    [id number]          Date of Birth 1955   Visit Number      [id number]           Age                 79 year(s)   Accession Number  [id number]          Room Number         07   Corporate ID      [id number]           [address] [place], [place]   Ordering          Jaylin Huitron, [address]  Interpreting        [facility]  Physician                             Physician           Readers                                                            Nancy Ferrara MD  Procedure Type of Study:   Veins:Lower Extremities DVT Study, VL EXTREMITY VENOUS DUPLEX LEFT. Vascular Sonographer Report  Indications for Study:Swelling and Leg pain. Additional Indications:Patient scanned bedside in ED with complaints of left lower extremity swelling for 1 week with shortness of breath. Patient noted pain behind knee over the past two days. Recent travel where patient was sedentary for over 8 hours. Venous Duplex Scan: B-mode imaging of the deep and superficial veins, with compression maneuvers, including color and Doppler spectral waveform analysis. Impressions Right Impression There is no evidence of deep venous thrombosis involving the right common femoral vein. Left Impression Acute totally occluding deep vein thrombosis involving the left femoral vein at the distal thigh, popliteal vein, gastrocnemius veins (4 of 4), posterior tibial, peroneal and soleal veins. No other evidence of deep vein or superficial vein thrombosis involving the left lower extremity. There is no previous exam for comparison. Conclusions   Summary   Extensive acute deep vein thrombosis involving the left femoral vein,  popliteal vein, gastrocnemius veins, posterior tibial, peroneal and soleal  veins.    Signature   ------------------------------------------------------------------  Electronically signed by Nancy Ferrara MD (Interpreting  physician) on 02/13/2023 at 04:27 PM  ------------------------------------------------------------------  Patient   XR CHEST PORTABLE    Result Date: 2/13/2023  EXAMINATION: ONE XRAY VIEW OF THE CHEST 2/13/2023 10:50 am COMPARISON: None. HISTORY: ORDERING SYSTEM PROVIDED HISTORY: sob TECHNOLOGIST PROVIDED HISTORY: Reason for exam:->sob Reason for Exam: sob FINDINGS: Diffuse airspace infiltrates. Prominence of the left superior mediastinal border. No pneumothorax or pleural effusion     1. Prominence of the left superior mediastinal border. It is unclear if this represents a paramediastinal mass or lymphadenopathy. Recommend further evaluation with a CT chest as there are no prior studies for comparison. 2.  Diffuse airspace infiltrates. These are indeterminate this could represent pneumonia or edema. CT CHEST PULMONARY EMBOLISM W CONTRAST    Result Date: 2/13/2023  EXAMINATION: CTA OF THE CHEST 2/13/2023 12:19 pm TECHNIQUE: CTA of the chest was performed after the administration of intravenous contrast.  Multiplanar reformatted images are provided for review. MIP images are provided for review. Automated exposure control, iterative reconstruction, and/or weight based adjustment of the mA/kV was utilized to reduce the radiation dose to as low as reasonably achievable. COMPARISON: None. HISTORY: ORDERING SYSTEM PROVIDED HISTORY: sob/tachy/abnl cxr TECHNOLOGIST PROVIDED HISTORY: Reason for exam:->sob/tachy/abnl cxr Decision Support Exception - unselect if not a suspected or confirmed emergency medical condition->Emergency Medical Condition (MA) Reason for Exam: chest pain in LT lower anterior chest, SOB,  dry cough , dizziness symptoms x 4-6 weeks but gerttting worse. pt did test (+) for COVID today   pt has had COVID one previous time with no lasting complications Relevant Medical/Surgical History: HTN,  GB removed FINDINGS: Pulmonary Arteries: The study is of good technical quality.   Acute bilateral pulmonary embolism is confirmed. A filling defect is observed within the left upper lobar pulmonary artery. Filling defect also demonstrated within posterior segmental branches of the right lower lobar pulmonary artery. Poor characterization of the right upper lobar pulmonary artery due to dense beam hardening artifact within the SVC. However, there does appear to be a filling defect within an anterior segmental branch of the right upper lobar pulmonary artery as well. RV: LV ratio 1.2. Mediastinum: Mild cardiomegaly. Small pericardial effusion. Normal aorta. Thyroid and esophagus normal.  Extensive mediastinal and hilar lymphadenopathy. Index right paratracheal lymph node measures 13 mm. Lungs/pleura: The airways are patent. Small left pleural effusion. There is a mass in the left pulmonary hilum extending into the upper lobe. Best approximate measurements 3.5 x 3.5 cm. Of note, this mass appears to cross the major fissure involving the superior segment of the left lower lobe as well and insinuates within the left pulmonary hilum. Additional solid pulmonary nodules are identified in the bilateral lower lobes and right upper lobe. An index nodule in the right upper lobe measures 7 x 5 mm. Mosaic pattern of ground-glass opacification in the lungs diffusely. Interlobular septal thickening is also demonstrated. Upper Abdomen: Multiple low attenuating masses are demonstrated within the liver. These cannot be characterized on this angiographic study of the chest.  The largest nodule in the right hepatic lobe measures 3.2 cm. Soft Tissues/Bones: No skeletal abnormalities throughout the chest.     1. Acute bilateral pulmonary embolism is confirmed. 2. No significant right ventricular strain. 3. Large mass centered in the left pulmonary hilum, suspected to represent bronchogenic carcinoma. Pulmonary follow-up is recommended. 4. Mediastinal and bilateral hilar lymphadenopathy. 5. Multiple small pulmonary nodules bilaterally. Pulmonary metastasis cannot be excluded. 6. Suspected malignant left pleural effusion. 7. Suspected hepatic metastasis. 8. Mosaic attenuation throughout the lungs likely in association with known pulmonary emboli. 9. Small pericardial effusion noted incidentally. Findings were discussed with the emergency room clinician at 1:15 pm on 2/13/2023. Assessment and plan:  Acute respiratory failure, hypoxemic. Probably related to pneumonia process in the lungs, potentially due to COVID-19 infection. Oxygen to keep SaO2 >90%  Pulmonary embolism. On heparin, echocardiogram with septal flattening suggestive of RV pressure overload  DVT. Extensive clot in the left leg. On heparin. No obvious risk factors, could be hypercoagulable from likely underlying malignancy  Lung mass. Proximal mass with adenopathy, could represent small cell lung cancer. The patient is aware that this is a suspected diagnosis, as is her . Hilar and mediastinal adenopathy. Could be a candidate for EBUS, but I believe the liver is a better target for biopsy  Pleural effusion. Likely malignant, small. Not sufficient to yield on cytology to make a definitive diagnosis in many patients. Will obtain CXR since she has ongoing pleuritic chest pain. If increased effusion, can attempt bedside thoracentesis after holding heparin  Liver lesions. Likely metastatic. IR planning biopsy of the liver lesion under ultrasound  Pericardial effusion. Small, unclear etiology  COVID-19 infection, likely COVID 19 pneumonia. Usually mild course after immunization, the older vaccines would not protect against the newer strains. On dexamethasone   Leukocytosis. Empiric antibiotic for now. Bacterial pneumonia cannot be ruled out. Slightly lower. Change azithromycin to oral  Normochromic, normocytic anemia. Mild, follow H&H. Stable for now  Hypokalemia. Replace and monitor  Elevated proBNP. Imaging does not suggest CHF  Elevated troponin. Likely nonspecific  DVT prophylaxis.  On heparin      Discussed with patient, her  and nursing.  Discussed with Dr. Maxwell.      Addendum: I was informed by Dr. Maxwell that liver lesion biopsy would not be done tomorrow as she is on aspirin.  Planned for Monday    Electronically signed by:  Kaylin Bear MD    2/16/2023    11:20 AM.

## 2023-02-16 NOTE — ONCOLOGY
ONCOLOGY HEMATOLOGY CARE PROGRESS NOTE      SUBJECTIVE:    Afebrile and on 5 LPM by NC. Remains on UF. A/O x3. Denies needs. Seems to be breathing more comfortably. ROS:     Constitutional:  No weight loss, No fever, No chills, No night sweats. Energy level good.   Eyes:  No impairment or change in vision  ENT / Mouth:  No pain, abnormal ulceration, bleeding, nasal drip or change in voice or hearing  Cardiovascular:  No chest pain, palpitations, new edema, or calf discomfort  Respiratory:  No pain, hemoptysis, change to breathing  Breast:  No pain, discharge, change in appearance or texture  Gastrointestinal:  No pain, cramping, jaundice, change to eating and bowel habits  Urinary:  No pain, bleeding or change in continence  Genitalia: No pain, bleeding or discharge  Musculoskeletal:  No redness, pain, edema or weakness  Skin:  No pruritus, rash, change to nodules or lesions  Neurologic:  No discomfort, change in mental status, speech, sensory or motor activity  Psychiatric:  No change in concentration or change to affect or mood  Endocrine:  No hot flashes, increased thirst, or change to urine production  Hematologic: No petechiae, ecchymosis or bleeding  Lymphatic:  No lymphadenopathy or lymphedema  Allergy / Immunologic:  No eczema, hives, frequent or recurrent infections    OBJECTIVE        Physical    VITALS:  Patient Vitals for the past 24 hrs:   BP Temp Temp src Pulse Resp SpO2 Weight   02/16/23 0456 -- -- -- -- -- -- 197 lb 4.8 oz (89.5 kg)   02/16/23 0415 (!) 151/92 97.9 °F (36.6 °C) Oral 80 18 -- --   02/16/23 0120 -- -- -- -- 18 -- --   02/16/23 0049 (!) 146/85 98 °F (36.7 °C) Oral 84 18 93 % --   02/15/23 2327 (!) 163/91 97.9 °F (36.6 °C) Oral 87 18 94 % --   02/15/23 1946 (!) 157/94 98 °F (36.7 °C) Oral 93 18 -- --   02/15/23 1600 133/82 97.6 °F (36.4 °C) Oral 91 16 94 % --   02/15/23 1130 (!) 143/91 98 °F (36.7 °C) Oral (!) 101 16 93 % --   02/15/23 0730 137/88 97.9 °F (36.6 °C) Oral 97 16 93 % --         24HR INTAKE/OUTPUT:    Intake/Output Summary (Last 24 hours) at 2/16/2023 1978  Last data filed at 2/16/2023 0416  Gross per 24 hour   Intake 3650 ml   Output 5800 ml   Net -2150 ml         CONSTITUTIONAL: awake, alert, cooperative, no apparent distress   EYES: pupils equal, round and reactive to light, sclera clear and conjunctiva normal  ENT: Normocephalic, without obvious abnormality, atraumatic  NECK: supple, symmetrical, no jugular venous distension and no carotid bruits   HEMATOLOGIC/LYMPHATIC: no cervical, supraclavicular or axillary lymphadenopathy   LUNGS: no increased work of breathing and clear to auscultation   CARDIOVASCULAR: regular rate and rhythm, normal S1 and S2, no murmur noted  ABDOMEN: normal bowel sounds x 4, soft, non-distended, non-tender, no masses palpated, no hepatosplenomgaly   MUSCULOSKELETAL: full range of motion noted, tone is normal  NEUROLOGIC: awake, alert, oriented to name, place and time. Motor skills grossly intact. SKIN: Normal skin color, texture, turgor and no jaundice.  appears intact   EXTREMITIES: no LE edema     DATA:  CBC:    Recent Labs     02/15/23  0601 02/14/23  0357 02/13/23  1112   WBC 14.1* 17.3* 17.8*   NEUTROABS 11.2* 13.6* 16.4*   LYMPHOPCT 12.7 12.9 4.7   RBC 3.66* 4.05 4.69   HGB 10.3* 11.2* 12.9   HCT 31.4* 34.1* 39.7   MCV 85.7 84.2 84.6   MCH 28.1 27.6 27.5   MCHC 32.7 32.7 32.4   RDW 17.4* 17.7* 17.3*    150 133*         PT/INR:    Recent Labs     02/15/23  0601 02/13/23  1112   PROT  --  7.2   INR 1.16*  --        PTT:    Recent Labs     02/13/23  1305   APTT 26.5         CMP:    Recent Labs     02/15/23  0601 02/14/23  0357 02/13/23  1112    141 141   K 3.3* 3.4* 3.3*    106 101   CO2 21 23 24   GLUCOSE 100* 97 131*   BUN 15 20 22*   CREATININE 0.7 0.7 0.8   LABGLOM >60 >60 >60   CALCIUM 9.0 9.1 10.4   PROT  --   --  7.2   LABALBU  --   --  4.0   AGRATIO  --   --  1.3   BILITOT --   --  0.9   ALKPHOS  --   --  351*   ALT  --   --  21   AST  --   --  34   MG 2.30 2.30  --          Lab Results   Component Value Date    CALCIUM 9.0 02/15/2023       LDH:  Recent Labs     02/15/23  0601   *       Radiology Review:  VL Extremity Venous Left  Lower Extremities DVT Study     Demographics      Patient Name      YADY TRIPP      Date of Study     02/13/2023          Gender              Female      Patient Number    [id number]          Date of Birth       1955      Visit Number      [id number]           Age                 67 year(s)      Accession Number  [id number]          Room Number         07      Corporate ID      [id number]           Sonographer         Marie Frazier RVS      Ordering          Wilfred Ortiz PA  Interpreting        [facility]   Physician                             Physician           Readers                                                             Dave Vizcaino MD     Procedure    Type of Study:      Veins:Lower Extremities DVT Study, VL EXTREMITY VENOUS DUPLEX LEFT.      Vascular Sonographer Report     Indications for Study:Swelling and Leg pain.    Additional Indications:Patient scanned bedside in ED with complaints of left  lower extremity swelling for 1 week with shortness of breath. Patient noted  pain behind knee over the past two days. Recent travel where patient was  sedentary for over 8 hours.    Venous Duplex Scan: B-mode imaging of the deep and superficial veins, with  compression maneuvers, including color and Doppler spectral waveform analysis.    Impressions  Right Impression  There is no evidence of deep venous thrombosis involving the right common  femoral vein.  Left Impression  Acute totally occluding deep vein thrombosis involving the left femoral vein  at the distal thigh, popliteal vein, gastrocnemius  veins (4 of 4), posterior  tibial, peroneal and soleal veins. No other evidence of deep vein or superficial vein thrombosis involving the  left lower extremity. There is no previous exam for comparison. Conclusions      Summary      Extensive acute deep vein thrombosis involving the left femoral vein,   popliteal vein, gastrocnemius veins, posterior tibial, peroneal and soleal   veins. Signature      ------------------------------------------------------------------   Electronically signed by Hayden Green MD (Interpreting   physician) on 02/13/2023 at 04:27 PM   ------------------------------------------------------------------     Patient Status:ER. Study Shirin Forman 46 - Vascular Lab. Technical Quality:Adequate visualization.      - Results were reported to:Wilberto Lerner RN @ 12:23pm on 2/13/23. Velocities are measured in cm/s ; Diameters are measured in mm    Right Lower Extremities DVT Study Measurements  Right 2D Measurements  +--------------+----------+---------------+----------+  ! Location      ! Visualized! Compressibility! Thrombosis! +--------------+----------+---------------+----------+  ! Common Femoral!Yes       ! Yes            ! None      !  +--------------+----------+---------------+----------+    Right Doppler Measurements  +--------------+------+------+------------+  ! Location      ! Signal!Reflux! Reflux (sec)! +--------------+------+------+------------+  ! Common Femoral!Phasic! No    !            !  +--------------+------+------+------------+    Left Lower Extremities DVT Study Measurements  Left 2D Measurements  +------------------------+----------+---------------+----------+  ! Location                ! Visualized! Compressibility! Thrombosis! +------------------------+----------+---------------+----------+  ! Sapheno Femoral Junction! Yes       ! Yes            ! None      !  +------------------------+----------+---------------+----------+  ! GSV Thigh               ! Yes !Yes            !None      !  +------------------------+----------+---------------+----------+  ! Common Femoral          !Yes       ! Yes            ! None      !  +------------------------+----------+---------------+----------+  ! Femoral                 !Yes       ! Yes            ! None      !  +------------------------+----------+---------------+----------+  ! Prox Femoral            !Yes       ! Yes            ! None      !  +------------------------+----------+---------------+----------+  ! Mid Femoral             !Yes       ! Yes            ! None      !  +------------------------+----------+---------------+----------+  ! Dist Femoral            !Yes       ! No             !Acute     !  +------------------------+----------+---------------+----------+  ! Deep Femoral            !Yes       ! Yes            ! None      !  +------------------------+----------+---------------+----------+  ! Popliteal               !Yes       ! No             !Acute     !  +------------------------+----------+---------------+----------+  ! GSV Below Knee          ! Yes       ! Yes            ! None      !  +------------------------+----------+---------------+----------+  ! Gastroc                 ! Yes       ! No             !Acute     !  +------------------------+----------+---------------+----------+  ! Soleal                  !Yes       ! No             !Acute     !  +------------------------+----------+---------------+----------+  ! PTV                     ! Yes       ! No             !Acute     !  +------------------------+----------+---------------+----------+  ! Peroneal                !Yes       ! No             !Acute     !  +------------------------+----------+---------------+----------+  ! GSV Calf                ! Yes       ! Yes            ! None      !  +------------------------+----------+---------------+----------+  ! SSV                     ! Yes       ! Yes            ! None !  +------------------------+----------+---------------+----------+    Left Doppler Measurements  +------------------------+------+------+------------+  ! Location                ! Signal!Reflux! Reflux (sec)! +------------------------+------+------+------------+  ! Sapheno Femoral Junction! Phasic! No    !            !  +------------------------+------+------+------------+  ! Common Femoral          !Phasic! No    !            !  +------------------------+------+------+------------+  ! Femoral                 !Phasic! No    !            !  +------------------------+------+------+------------+  ! Dist Femoral            !Absent! No    !            !  +------------------------+------+------+------------+  ! Deep Femoral            !Phasic! No    !            !  +------------------------+------+------+------------+  ! Popliteal               !Absent! No    !            !  +------------------------+------+------+------------+  CT CHEST PULMONARY EMBOLISM W CONTRAST  Narrative: EXAMINATION:  CTA OF THE CHEST 2/13/2023 12:19 pm    TECHNIQUE:  CTA of the chest was performed after the administration of intravenous  contrast.  Multiplanar reformatted images are provided for review. MIP  images are provided for review. Automated exposure control, iterative  reconstruction, and/or weight based adjustment of the mA/kV was utilized to  reduce the radiation dose to as low as reasonably achievable. COMPARISON:  None. HISTORY:  ORDERING SYSTEM PROVIDED HISTORY: sob/tachy/abnl cxr  TECHNOLOGIST PROVIDED HISTORY:  Reason for exam:->sob/tachy/abnl cxr  Decision Support Exception - unselect if not a suspected or confirmed  emergency medical condition->Emergency Medical Condition (MA)  Reason for Exam: chest pain in LT lower anterior chest, SOB,  dry cough ,  dizziness symptoms x 4-6 weeks but gerttting worse.   pt did test (+) for  COVID today   pt has had COVID one previous time with no lasting complications  Relevant Medical/Surgical History: HTN,  GB removed    FINDINGS:  Pulmonary Arteries: The study is of good technical quality. Acute bilateral  pulmonary embolism is confirmed. A filling defect is observed within the  left upper lobar pulmonary artery. Filling defect also demonstrated within  posterior segmental branches of the right lower lobar pulmonary artery. Poor  characterization of the right upper lobar pulmonary artery due to dense beam  hardening artifact within the SVC. However, there does appear to be a  filling defect within an anterior segmental branch of the right upper lobar  pulmonary artery as well. RV: LV ratio 1.2. Mediastinum: Mild cardiomegaly. Small pericardial effusion. Normal aorta. Thyroid and esophagus normal.  Extensive mediastinal and hilar  lymphadenopathy. Index right paratracheal lymph node measures 13 mm. Lungs/pleura: The airways are patent. Small left pleural effusion. There is  a mass in the left pulmonary hilum extending into the upper lobe. Best  approximate measurements 3.5 x 3.5 cm. Of note, this mass appears to cross  the major fissure involving the superior segment of the left lower lobe as  well and insinuates within the left pulmonary hilum. Additional solid  pulmonary nodules are identified in the bilateral lower lobes and right upper  lobe. An index nodule in the right upper lobe measures 7 x 5 mm. Mosaic  pattern of ground-glass opacification in the lungs diffusely. Interlobular  septal thickening is also demonstrated. Upper Abdomen: Multiple low attenuating masses are demonstrated within the  liver. These cannot be characterized on this angiographic study of the  chest.  The largest nodule in the right hepatic lobe measures 3.2 cm. Soft Tissues/Bones: No skeletal abnormalities throughout the chest.  Impression: 1. Acute bilateral pulmonary embolism is confirmed. 2. No significant right ventricular strain.   3. 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 Pulmonary follow-up is recommended. 4. Mediastinal and bilateral hilar lymphadenopathy. 5. Multiple small pulmonary nodules bilaterally. Pulmonary metastasis cannot  be excluded. 6. Suspected malignant left pleural effusion. 7. Suspected hepatic metastasis. 8. Mosaic attenuation throughout the lungs likely in association with known  pulmonary emboli. 9. Small pericardial effusion noted incidentally. Findings were discussed with the emergency room clinician at 1:15 pm on  2/13/2023. XR CHEST PORTABLE  Narrative: EXAMINATION:  ONE XRAY VIEW OF THE CHEST    2/13/2023 10:50 am    COMPARISON:  None. HISTORY:  ORDERING SYSTEM PROVIDED HISTORY: sob  TECHNOLOGIST PROVIDED HISTORY:  Reason for exam:->sob  Reason for Exam: sob    FINDINGS:  Diffuse airspace infiltrates. Prominence of the left superior mediastinal  border. No pneumothorax or pleural effusion  Impression: 1. Prominence of the left superior mediastinal border. It is unclear if  this represents a paramediastinal mass or lymphadenopathy. Recommend further  evaluation with a CT chest as there are no prior studies for comparison. 2.  Diffuse airspace infiltrates. These are indeterminate this could  represent pneumonia or edema. Problem List  Patient Active Problem List   Diagnosis    Acute respiratory failure (HCC)    Acute deep vein thrombosis (DVT) of proximal vein of left lower extremity (HCC)    Bilateral pulmonary embolism (HCC)    Elevated troponin    Deep venous thrombosis (HCC)    Pericardial effusion    Pleural effusion on left    Pneumonia due to COVID-19 virus    Lung mass    Mediastinal adenopathy    Hilar adenopathy    Metastasis to liver [facility])       ASSESSMENT AND PLAN:    1.) Left lung mass  - CTPA, 2/13/2023, showed a 3.5 x 3.5 cm left hilar mass, left pleural effusion, multiple liver mets. Bone mets seemed likely.   - Would think we could hold heparin and biopsy the liver once her hypoxemic resp failure is improved. - Tentatively scheduled for Friday. NPO after MN. Stop UFH 4 hours prior to biopsy. Resume 4 hours after biopsy.  - PET/CT as an outpatient. MRI brain over the weekend or early next week. - Caris tumor profiling will be requested after the biopsy has been obtained. 2.) PE  - CTPA, 2/13/2023, showed bilateral PEs.  - On UFH. Stay on this for now.     3.) SARS-CoV-2  - SARS-CoV-2 PCR positive, 2/13/2023.  - CTPA, 2/13/2023, showed bilateral diffuse patchy GGO.  - She is on 5 LPM by NC (stable). - Started Dex, 2/14/2023. - Ceftriaxone/Azithro for potential bacterial superinfection.   - Will discuss with pulmonologist.     4.) Essential hypertension     5.) Hyperlipidemia     6.) Hypothyroidism        ONCOLOGIC DISPOSITION:      Caron Primrose, MD  Please contact through [address]

## 2023-02-16 NOTE — PROGRESS NOTES
Hospitalist Progress Note      PCP: YANN Santillan - [place]    Date of Admission: 2/13/2023       Chief Complaint: SOB      Hospital Course: reviewed      Subjective:  denies sob currently, family at bedside, remains on 5-6L oxygen       Medications:  Reviewed    Infusion Medications    heparin (PORCINE) Infusion 1,130 Units/hr (02/15/23 1046)    sodium chloride 1,000 mL (02/15/23 1816)    sodium chloride       Scheduled Medications    ascorbic acid  500 mg Oral Daily    zinc sulfate  50 mg Oral QPM    losartan  50 mg Oral BID    And    hydroCHLOROthiazide  12.5 mg Oral Daily    pantoprazole  40 mg Oral QAM AC    dexamethasone  6 mg IntraVENous Q24H    melatonin  5 mg Oral Nightly    atorvastatin  40 mg Oral Nightly    thyroid  30 mg Oral Once per day on Mon Wed Fri    thyroid  60 mg Oral Once per day on Sun Tue Thu Sat    sodium chloride flush  5-40 mL IntraVENous 2 times per day    cefTRIAXone (ROCEPHIN) IV  1,000 mg IntraVENous Q24H    azithromycin  250 mg IntraVENous Q24H     PRN Meds: traMADol, diphenhydrAMINE, heparin (porcine), heparin (porcine), sodium chloride flush, sodium chloride, ondansetron **OR** ondansetron, polyethylene glycol, acetaminophen **OR** acetaminophen, perflutren lipid microspheres, ipratropium-albuterol      Intake/Output Summary (Last 24 hours) at 2/16/2023 1039  Last data filed at 2/16/2023 0416  Gross per 24 hour   Intake 3650 ml   Output 5800 ml   Net -2150 ml       Physical Exam Performed:    BP (!) 152/91   Pulse 84   Temp 97.9 °F (36.6 °C) (Oral)   Resp 18   Ht 5' 1\" (1.549 m)   Wt 197 lb 4.8 oz (89.5 kg)   SpO2 93%   BMI 37.28 kg/m²     General appearance: No apparent distress, appears stated age and cooperative. HEENT: Pupils equal, round, and reactive to light. Conjunctivae/corneas clear. Neck: Supple, with full range of motion. No jugular venous distention. Trachea midline. Respiratory:  inc'd respiratory effort.  Clear to auscultation, bilaterally without Wheezes/Rhonchi. Noted scattered fine rales seem less today  Cardiovascular: Regular rate and rhythm with normal S1/S2 without murmurs, rubs or gallops. Abdomen: Soft, non-tender, non-distended with normal bowel sounds. Musculoskeletal: No clubbing, cyanosis or edema bilaterally. Full range of motion without deformity. Skin: Skin color, texture, turgor normal.  No rashes or lesions. Neurologic:  Neurovascularly intact without any focal sensory/motor deficits. Cranial nerves: II-XII intact, grossly non-focal.  Psychiatric: Alert and oriented, thought content appropriate, normal insight  Capillary Refill: Brisk, 3 seconds, normal   Peripheral Pulses: +2 palpable, equal bilaterally          Labs:   Recent Labs     02/14/23  0357 02/15/23  0601 02/16/23  0637   WBC 17.3* 14.1* 14.2*   HGB 11.2* 10.3* 10.3*   HCT 34.1* 31.4* 31.3*    157 186     Recent Labs     02/14/23  0357 02/15/23  0601 02/16/23  0636    144 145   K 3.4* 3.3* 3.5    110 112*   CO2 23 21 22   BUN 20 15 17   CREATININE 0.7 0.7 0.7   CALCIUM 9.1 9.0 9.1     Recent Labs     02/13/23  1112   AST 34   ALT 21   BILITOT 0.9   ALKPHOS 351*     Recent Labs     02/15/23  0601   INR 1.16*     Recent Labs     02/14/23  0136 02/14/23  0357 02/14/23  0836   TROPONINI 0.08* 0.08* 0.07*       Urinalysis:    No results found for: Charlott Billow, BACTERIA, RBCUA, BLOODU, SPECGRAV, GLUCOSEU    Radiology:  CT CHEST PULMONARY EMBOLISM W CONTRAST   Final Result   1. Acute bilateral pulmonary embolism is confirmed. 2. No significant right ventricular strain. 3. 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 Pulmonary follow-up is recommended. 4. Mediastinal and bilateral hilar lymphadenopathy. 5. Multiple small pulmonary nodules bilaterally. Pulmonary metastasis cannot   be excluded. 6. Suspected malignant left pleural effusion. 7. Suspected hepatic metastasis.    8. Mosaic attenuation throughout the lungs likely in association with known   pulmonary emboli. 9. Small pericardial effusion noted incidentally. Findings were discussed with the emergency room clinician at 1:15 pm on   2/13/2023. VL Extremity Venous Left   Final Result      XR CHEST PORTABLE   Final Result   1. Prominence of the left superior mediastinal border. It is unclear if   this represents a paramediastinal mass or lymphadenopathy. Recommend further   evaluation with a CT chest as there are no prior studies for comparison. 2.  Diffuse airspace infiltrates. These are indeterminate this could   represent pneumonia or edema.          US BIOPSY LIVER PERCUTANEOUS    (Results Pending)       IP CONSULT TO CARDIOLOGY  IP CONSULT TO PULMONOLOGY  IP CONSULT TO ONCOLOGY  IP CONSULT TO VASCULAR SURGERY    Assessment/Plan:    Active Hospital Problems    Diagnosis     Pneumonia due to COVID-19 virus [U07.1, J12.82]      Priority: Medium    Lung mass [R91.8]      Priority: Medium    Mediastinal adenopathy [R59.0]      Priority: Medium    Hilar adenopathy [R59.0]      Priority: Medium    Metastasis to liver [facility]) [C78.7]      Priority: Medium    Acute deep vein thrombosis (DVT) of proximal vein of left lower extremity (HCC) [I82.4Y2]      Priority: Medium    Bilateral pulmonary embolism (HCC) [I26.99]      Priority: Medium    Elevated troponin [R77.8]      Priority: Medium    Deep venous thrombosis (HCC) [I82.409]      Priority: Medium    Pericardial effusion [I31.39]      Priority: Medium    Pleural effusion on left [J90]      Priority: Medium    Acute respiratory failure (HCC) [J96.00]      Priority: Medium          Acute hypoxic resp failure- likely multifactorial(pna, PE, ?new lung mass)  -tx underlying isuses  -supp ox, weaned as tolerated  -pulm consulted, apprec recs   -iv dexamethasone started 2/14 for covid     Abn CT- with lung mass/suspected liver mets  -hemonc consulted/pulm consulted  -IR biopsy planned 2/20 after asa washout       Possible pna- ?related to COVID infection, pt is vaccinated with Moderna(4 shots)  -Iv rocephin/azithro continued  -precautions  -Bc pending  -Checked procal and wnl   -vit c/zinc started  -check vit d level(wnl), ldh/crp/esr  -started on iv steroids     VTE- with new PE and DVT, POA  -heparin ggt started  -vasc surg consulted by cards, CTPA reviewed, no intervention needed at this time given PE in distal arteries     Elevated BNP/troponin - likely from rt heart strain, related to above  -Asa given  -Echo done(ef 56%, rv pressure overload, rv systolic fcn dec'd, trival circum pericard effusion)  -Cards consulted  -trended angel     Htn-held hctz/arb for now     Hld- on statin     Thyroid- on armour home dosing     Gerd- on ppi        DVT Prophylaxis: heparin ggt  Diet: ADULT DIET;  Regular  Diet NPO Exceptions are: Sips of Water with Meds  Code Status: Full Code  PT/OT Eval Status: ordered 2/16 pm    Dispo - stop aspirin, IR to do biopsy Monday    Appropriate for A1 Discharge Unit: Cecilia Tejeda MD

## 2023-02-16 NOTE — PLAN OF CARE
Problem: Discharge Planning  Goal: Discharge to home or other facility with appropriate resources  Flowsheets (Taken 2/16/2023 1135)  Discharge to home or other facility with appropriate resources:   Identify barriers to discharge with patient and caregiver   Identify discharge learning needs (meds, wound care, etc)     Problem: Safety - Adult  Goal: Free from fall injury  Flowsheets (Taken 2/16/2023 1135)  Free From Fall Injury:   Instruct family/caregiver on patient safety   Based on caregiver fall risk screen, instruct family/caregiver to ask for assistance with transferring infant if caregiver noted to have fall risk factors     Problem: Pain  Goal: Verbalizes/displays adequate comfort level or baseline comfort level  Flowsheets (Taken 2/16/2023 1135)  Verbalizes/displays adequate comfort level or baseline comfort level:   Encourage patient to monitor pain and request assistance   Assess pain using appropriate pain scale   Administer analgesics based on type and severity of pain and evaluate response

## 2023-02-17 ENCOUNTER — APPOINTMENT (OUTPATIENT)
Dept: ULTRASOUND IMAGING | Age: 68
DRG: 987 | End: 2023-02-17
Payer: COMMERCIAL

## 2023-02-17 LAB
ANION GAP SERPL CALCULATED.3IONS-SCNC: 11 MMOL/L (ref 3–16)
ANTI-XA UNFRAC HEPARIN: 0.5 IU/ML (ref 0.3–0.7)
BANDED NEUTROPHILS RELATIVE PERCENT: 2 % (ref 0–7)
BASOPHILS ABSOLUTE: 0 K/UL (ref 0–0.2)
BASOPHILS RELATIVE PERCENT: 0 %
BLOOD CULTURE, ROUTINE: NORMAL
BUN BLDV-MCNC: 18 MG/DL (ref 7–20)
CALCIUM SERPL-MCNC: 9.3 MG/DL (ref 8.3–10.6)
CHLORIDE BLD-SCNC: 112 MMOL/L (ref 99–110)
CO2: 22 MMOL/L (ref 21–32)
CREAT SERPL-MCNC: 0.7 MG/DL (ref 0.6–1.2)
CULTURE, BLOOD 2: NORMAL
EOSINOPHILS ABSOLUTE: 0.3 K/UL (ref 0–0.6)
EOSINOPHILS RELATIVE PERCENT: 2 %
GFR SERPL CREATININE-BSD FRML MDRD: >60 ML/MIN/{1.73_M2}
GLUCOSE BLD-MCNC: 94 MG/DL (ref 70–99)
HCT VFR BLD CALC: 34.3 % (ref 36–48)
HEMOGLOBIN: 11 G/DL (ref 12–16)
LYMPHOCYTES ABSOLUTE: 2.3 K/UL (ref 1–5.1)
LYMPHOCYTES RELATIVE PERCENT: 18 %
MCH RBC QN AUTO: 27.4 PG (ref 26–34)
MCHC RBC AUTO-ENTMCNC: 32 G/DL (ref 31–36)
MCV RBC AUTO: 85.9 FL (ref 80–100)
METAMYELOCYTES RELATIVE PERCENT: 3 %
MONOCYTES ABSOLUTE: 0.5 K/UL (ref 0–1.3)
MONOCYTES RELATIVE PERCENT: 4 %
NEUTROPHILS ABSOLUTE: 9.9 K/UL (ref 1.7–7.7)
NEUTROPHILS RELATIVE PERCENT: 71 %
NUCLEATED RED BLOOD CELLS: 1 /100 WBC
PDW BLD-RTO: 17.1 % (ref 12.4–15.4)
PLATELET # BLD: 228 K/UL (ref 135–450)
PLATELET SLIDE REVIEW: ADEQUATE
PMV BLD AUTO: 8.9 FL (ref 5–10.5)
POTASSIUM REFLEX MAGNESIUM: 3.6 MMOL/L (ref 3.5–5.1)
RBC # BLD: 4 M/UL (ref 4–5.2)
SLIDE REVIEW: ABNORMAL
SODIUM BLD-SCNC: 145 MMOL/L (ref 136–145)
WBC # BLD: 13 K/UL (ref 4–11)

## 2023-02-17 PROCEDURE — 2580000003 HC RX 258: Performed by: INTERNAL MEDICINE

## 2023-02-17 PROCEDURE — 94761 N-INVAS EAR/PLS OXIMETRY MLT: CPT

## 2023-02-17 PROCEDURE — 97530 THERAPEUTIC ACTIVITIES: CPT

## 2023-02-17 PROCEDURE — 1200000000 HC SEMI PRIVATE

## 2023-02-17 PROCEDURE — 2700000000 HC OXYGEN THERAPY PER DAY

## 2023-02-17 PROCEDURE — 97166 OT EVAL MOD COMPLEX 45 MIN: CPT

## 2023-02-17 PROCEDURE — 6370000000 HC RX 637 (ALT 250 FOR IP): Performed by: INTERNAL MEDICINE

## 2023-02-17 PROCEDURE — 85025 COMPLETE CBC W/AUTO DIFF WBC: CPT

## 2023-02-17 PROCEDURE — 85520 HEPARIN ASSAY: CPT

## 2023-02-17 PROCEDURE — 6370000000 HC RX 637 (ALT 250 FOR IP): Performed by: NURSE PRACTITIONER

## 2023-02-17 PROCEDURE — 6360000002 HC RX W HCPCS: Performed by: INTERNAL MEDICINE

## 2023-02-17 PROCEDURE — 99232 SBSQ HOSP IP/OBS MODERATE 35: CPT | Performed by: INTERNAL MEDICINE

## 2023-02-17 PROCEDURE — 80048 BASIC METABOLIC PNL TOTAL CA: CPT

## 2023-02-17 PROCEDURE — 99222 1ST HOSP IP/OBS MODERATE 55: CPT | Performed by: INTERNAL MEDICINE

## 2023-02-17 PROCEDURE — 36415 COLL VENOUS BLD VENIPUNCTURE: CPT

## 2023-02-17 PROCEDURE — 97535 SELF CARE MNGMENT TRAINING: CPT

## 2023-02-17 PROCEDURE — 2500000003 HC RX 250 WO HCPCS: Performed by: INTERNAL MEDICINE

## 2023-02-17 RX ORDER — FUROSEMIDE 10 MG/ML
40 INJECTION INTRAMUSCULAR; INTRAVENOUS ONCE
Status: COMPLETED | OUTPATIENT
Start: 2023-02-17 | End: 2023-02-17

## 2023-02-17 RX ADMIN — HYDROCHLOROTHIAZIDE 12.5 MG: 12.5 CAPSULE ORAL at 08:20

## 2023-02-17 RX ADMIN — SODIUM CHLORIDE 1000 ML: 9 INJECTION, SOLUTION INTRAVENOUS at 15:19

## 2023-02-17 RX ADMIN — ATORVASTATIN CALCIUM 40 MG: 40 TABLET, FILM COATED ORAL at 20:17

## 2023-02-17 RX ADMIN — CEFTRIAXONE SODIUM 1000 MG: 1 INJECTION, POWDER, FOR SOLUTION INTRAMUSCULAR; INTRAVENOUS at 15:20

## 2023-02-17 RX ADMIN — HEPARIN SODIUM 1130 UNITS/HR: 10000 INJECTION, SOLUTION INTRAVENOUS at 07:08

## 2023-02-17 RX ADMIN — Medication 50 MG: at 18:19

## 2023-02-17 RX ADMIN — DOCUSATE SODIUM 100 MG: 100 CAPSULE, LIQUID FILLED ORAL at 08:20

## 2023-02-17 RX ADMIN — LOSARTAN POTASSIUM 50 MG: 25 TABLET, FILM COATED ORAL at 20:17

## 2023-02-17 RX ADMIN — SODIUM CHLORIDE 1000 ML: 9 INJECTION, SOLUTION INTRAVENOUS at 14:17

## 2023-02-17 RX ADMIN — LOSARTAN POTASSIUM 50 MG: 25 TABLET, FILM COATED ORAL at 08:20

## 2023-02-17 RX ADMIN — DEXAMETHASONE SODIUM PHOSPHATE 6 MG: 4 INJECTION, SOLUTION INTRAMUSCULAR; INTRAVENOUS at 06:27

## 2023-02-17 RX ADMIN — AZITHROMYCIN MONOHYDRATE 250 MG: 250 TABLET ORAL at 16:10

## 2023-02-17 RX ADMIN — THYROID, PORCINE 30 MG: 30 TABLET ORAL at 06:27

## 2023-02-17 RX ADMIN — PANTOPRAZOLE SODIUM 40 MG: 40 TABLET, DELAYED RELEASE ORAL at 06:27

## 2023-02-17 RX ADMIN — Medication 5 MG: at 20:17

## 2023-02-17 RX ADMIN — OXYCODONE HYDROCHLORIDE AND ACETAMINOPHEN 500 MG: 500 TABLET ORAL at 08:20

## 2023-02-17 RX ADMIN — FUROSEMIDE 40 MG: 10 INJECTION, SOLUTION INTRAMUSCULAR; INTRAVENOUS at 18:19

## 2023-02-17 RX ADMIN — SODIUM CHLORIDE, PRESERVATIVE FREE 10 ML: 5 INJECTION INTRAVENOUS at 08:20

## 2023-02-17 ASSESSMENT — PAIN SCALES - GENERAL
PAINLEVEL_OUTOF10: 3
PAINLEVEL_OUTOF10: 0

## 2023-02-17 NOTE — PROGRESS NOTES
Occupational Therapy  Facility/Department: [facility]  Occupational Therapy Initial Assessment/ Treatment    Name: Zach Lewis  : 1955  MRN: [id number]  Date of Service: 2023    Discharge Recommendations:  Continue to assess pending progress  OT Equipment Recommendations  Equipment Needed:  (CTA)       Patient Diagnosis(es): The primary encounter diagnosis was Bilateral pulmonary embolism ([address] Diagnoses of Acute deep vein thrombosis (DVT) of proximal vein of left lower extremity ([address] Lung mass, Liver lesion, Pericardial effusion, Pleural effusion, Elevated troponin, Elevated brain natriuretic peptide (BNP) level, COVID, and Hypoxia were also pertinent to this visit. Past Medical History:  has a past medical history of Asthma. Past Surgical History:  has no past surgical history on file. Treatment Diagnosis: impaired ADLs      Assessment   Performance deficits / Impairments: Decreased functional mobility ; Decreased strength;Decreased ADL status; Decreased cognition;Decreased balance;Decreased high-level IADLs;Decreased safe awareness;Decreased endurance  Assessment: Pt is a 79 is 78 yo female admitted to [facility] with a dx of ARF, COVID+, DVT, and PE. Pt reports her/spouse are from [place] and are visiting with her daughter/MAC. Pt states daughter lives in a 3 story home with bed/bath on 3rd level. Pt IND with I/ADLs and mobility without AD PTA. Currently pt is CGA for bed mobility and SPT, max A for LB dressing and toileting. Pt limited by decreased activity tolerance and dyspnea with exertion. Pt is presenting below baseline and would benefit from skilled OT services during acute hospital stay to address the above deficits. Will continue to assess discharge recs pending pt's progress during therapy (IPR vs home with 24H+HHOT).   Treatment Diagnosis: impaired ADLs  Prognosis: Fair  Decision Making: Medium Complexity  REQUIRES OT FOLLOW-UP: Yes  Activity Tolerance  Activity Tolerance: Patient Tolerated treatment well  Activity Tolerance Comments: SOB noted after transfers, O2 92%,  bpm after SPT to [facility], RN made aware        Plan   Occupational Therapy Plan  Times Per Week: 3-5x  Current Treatment Recommendations: Strengthening, Balance training, Gait training, Functional mobility training, Stair training, Endurance training, Safety education & training, Equipment evaluation, education, & procurement, Patient/Caregiver education & training, Self-Care / ADL     Restrictions  Restrictions/Precautions  Restrictions/Precautions: General Precautions, Fall Risk  Position Activity Restriction  Other position/activity restrictions: tele, 5L O2    Subjective   General  Patient assessed for rehabilitation services?: Yes  Pt denies pain at rest  Social/Functional History  Social/Functional History  Lives With: Family, Daughter, Spouse (son in law+ 1 small dog)  Type of Home: House  Home Layout: Multi-level, Bed/Bath upstairs, 1/2 bath on main level (bed/bath on 3rd level)  Home Access: Stairs to enter with rails  Entrance Stairs - Number of Steps: 3  Entrance Stairs - Rails: Both (cannot reach both HR at same time; typically uses R HR)  Bathroom Shower/Tub: Tub/Shower unit, Walk-in shower (can use walk in shower down the charles)  [employer]: Handicap height  Bathroom Equipment: Hand-held shower  Bathroom Accessibility: Not accessible  Home Equipment:  (no AD)  Has the patient had two or more falls in the past year or any fall with injury in the past year?: No  ADL Assistance: Independent  Homemaking Assistance: Independent  Ambulation Assistance: Independent  Transfer Assistance: Independent  Active : Yes  Mode of Transportation: [address]: puzzles, word search, participate in missions with the [employer]  Additional Comments: Pt reporting from [place], pt's spouse present and reporting able to provide 24H supv/assist at discharge       Objective   Heart Rate: 91  Heart Rate Source: Monitor  BP: 139/87  BP Location: Left upper arm  BP Method: Automatic  Patient Position: Semi fowlers  MAP (Calculated): 104  Resp: 22  SpO2: 95 %  O2 Device: Nasal cannula (5L)          Observation/Palpation  Posture: Fair  Safety Devices  Type of Devices: Call light within reach; Left in bed;Gait belt;Nurse notified; Patient at risk for falls; Bed alarm in place (RN present in room at EOS.)  Restraints  Restraints Initially in Place: No  Bed Mobility Training  Bed Mobility Training: Yes  Overall Level of Assistance: Contact-guard assistance  Supine to Sit: Contact-guard assistance (HOB elevated, use of BR)  Sit to Supine: Contact-guard assistance (HOB elevated, use of BR)  Scooting: Contact-guard assistance (to EOB)  Balance  Sitting: Intact  Standing: With support (CGA without AD)  Transfer Training  Transfer Training: Yes  Overall Level of Assistance: Contact-guard assistance (no AD)  Sit to Stand: Contact-guard assistance (no AD)  Stand to Sit: Contact-guard assistance (no AD)  Stand Pivot Transfers: Contact-guard assistance (no AD)  Toilet Transfer: Contact-guard assistance (to BSC, no AD)     AROM: Generally decreased, functional  Strength: Generally decreased, functional  Coordination: Generally decreased, functional  Tone: Normal  Sensation: Intact  ADL  Feeding: Setup  LE Dressing: Maximum assistance  LE Dressing Skilled Clinical Factors: breif exchange at [facility], sock management at EOB  Toileting: Maximum assistance  Toileting Skilled Clinical Factors: Assist with all parts at [facility] Impaired  Vision Exceptions: Wears glasses at all times  Hearing  Hearing: Exceptions to [facility]  Hearing Exceptions: Left hearing aid;Hard of hearing/hearing concerns (did not bring hearing aid to hospital)  Orientation  Overall Orientation Status: Within Functional Limits  Orientation Level: Oriented X4                  Education Given To: Patient; Family  Education Provided: Role of Therapy;Plan of Care;Transfer Training; Fall Prevention Strategies; Energy Conservation  Education Provided Comments: Disease Specific Education: Pt educated on importance of OOB mobility, prevention of complications of bedrest, and general safety during hospitalization. Pt verbalized understanding  Education Method: Verbal  Barriers to Learning: None  Education Outcome: Verbalized understanding      AM-PAC Score  AM-PAC Inpatient Daily Activity Raw Score: 14 (02/17/23 1845)  AM-PAC Inpatient ADL T-Scale Score : 33.39 (02/17/23 1845)  ADL Inpatient CMS 0-100% Score: 59.67 (02/17/23 1845)  ADL Inpatient CMS G-Code Modifier : CK (02/17/23 1845)    Goals  Short Term Goals  Time Frame for Short Term Goals: 1 week (2/24) unless noted  Short Term Goal 1: Pt will complete ambulatory transfers with SBA  Short Term Goal 2: Pt will tolerate >3 mins stance at sink during ADLs with SBA  Short Term Goal 3: Pt will complete LB dressing with min A  Short Term Goal 4: Pt will complete x15 BUE exercises for strength and endurance  Patient Goals   Patient goals : \"I want to be able to travel back to [place]\"       Therapy Time   Individual Concurrent Group Co-treatment   Time In 1730         Time Out 1820         Minutes 50         Timed Code Treatment Minutes: 40 Minutes (10 min eval)     If pt is unable to be seen after this session, please let this note serve as discharge summary. Please see case management note for discharge disposition. Thank you.     Jackie Colmenares OT

## 2023-02-17 NOTE — PROGRESS NOTES
Hospitalist Progress Note      PCP: Karissa Salinas APRN - CNP    Date of Admission: 2/13/2023    Chief Complaint: SOB      Hospital Course: reviewed       Subjective:  denies sob currently, family at bedside, remains on 5-6L oxygen , no overnight events      Medications:  Reviewed    Infusion Medications    heparin (PORCINE) Infusion 1,130 Units/hr (02/17/23 0708)    sodium chloride 1,000 mL (02/16/23 7156)    sodium chloride       Scheduled Medications    azithromycin  250 mg Oral Daily    docusate sodium  100 mg Oral Daily    ascorbic acid  500 mg Oral Daily    zinc sulfate  50 mg Oral QPM    losartan  50 mg Oral BID    And    hydroCHLOROthiazide  12.5 mg Oral Daily    pantoprazole  40 mg Oral QAM AC    dexamethasone  6 mg IntraVENous Q24H    melatonin  5 mg Oral Nightly    atorvastatin  40 mg Oral Nightly    thyroid  30 mg Oral Once per day on Mon Wed Fri    thyroid  60 mg Oral Once per day on Sun Tue Thu Sat    sodium chloride flush  5-40 mL IntraVENous 2 times per day    cefTRIAXone (ROCEPHIN) IV  1,000 mg IntraVENous Q24H     PRN Meds: traMADol, diphenhydrAMINE, heparin (porcine), heparin (porcine), sodium chloride flush, sodium chloride, ondansetron **OR** ondansetron, polyethylene glycol, acetaminophen **OR** acetaminophen, perflutren lipid microspheres, ipratropium-albuterol      Intake/Output Summary (Last 24 hours) at 2/17/2023 0906  Last data filed at 2/17/2023 3081  Gross per 24 hour   Intake 1040 ml   Output 2000 ml   Net -960 ml       Physical Exam Performed:    BP (!) 157/91   Pulse 76   Temp 98.1 °F (36.7 °C) (Oral)   Resp 16   Ht 5' 1\" (1.549 m)   Wt 201 lb (91.2 kg)   SpO2 93%   BMI 37.98 kg/m²     General appearance: No apparent distress, appears stated age and cooperative. HEENT: Pupils equal, round, and reactive to light. Conjunctivae/corneas clear. Neck: Supple, with full range of motion. No jugular venous distention. Trachea midline. Respiratory:  inc'd respiratory effort. Clear to auscultation, bilaterally without Wheezes/Rhonchi. Noted scattered fine rales seem less today  Cardiovascular: Regular rate and rhythm with normal S1/S2 without murmurs, rubs or gallops. Abdomen: Soft, non-tender, non-distended with normal bowel sounds. Musculoskeletal: No clubbing, cyanosis or edema bilaterally. Full range of motion without deformity. Skin: Skin color, texture, turgor normal.  No rashes or lesions. Neurologic:  Neurovascularly intact without any focal sensory/motor deficits. Cranial nerves: II-XII intact, grossly non-focal.  Psychiatric: Alert and oriented, thought content appropriate, normal insight  Capillary Refill: Brisk, 3 seconds, normal   Peripheral Pulses: +2 palpable, equal bilaterally          Labs:   Recent Labs     02/15/23  0601 02/16/23  0637 02/17/23  0638   WBC 14.1* 14.2* 13.0*   HGB 10.3* 10.3* 11.0*   HCT 31.4* 31.3* 34.3*    186 228     Recent Labs     02/15/23  0601 02/16/23  0636 02/17/23  0638    145 145   K 3.3* 3.5 3.6    112* 112*   CO2 21 22 22   BUN 15 17 18   CREATININE 0.7 0.7 0.7   CALCIUM 9.0 9.1 9.3     No results for input(s): AST, ALT, BILIDIR, BILITOT, ALKPHOS in the last 72 hours. Recent Labs     02/15/23  0601   INR 1.16*     No results for input(s): [place] in the last 72 hours. Urinalysis:    No results found for: Chick Jeff, BACTERIA, RBCUA, BLOODU, SPECGRAV, [address]    Radiology:  XR CHEST PORTABLE   Final Result   No significant interval change. Patchy bilateral interstitial and airspace   opacities. Left suprahilar opacity. CT CHEST PULMONARY EMBOLISM W CONTRAST   Final Result   1. Acute bilateral pulmonary embolism is confirmed. 2. No significant right ventricular strain. 3. 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 Pulmonary follow-up is recommended. 4. Mediastinal and bilateral hilar lymphadenopathy.    5. Multiple small pulmonary nodules bilaterally. Pulmonary metastasis cannot   be excluded. 6. Suspected malignant left pleural effusion. 7. Suspected hepatic metastasis. 8. Mosaic attenuation throughout the lungs likely in association with known   pulmonary emboli. 9. Small pericardial effusion noted incidentally. Findings were discussed with the emergency room clinician at 1:15 pm on   2/13/2023. VL Extremity Venous Left   Final Result      XR CHEST PORTABLE   Final Result   1. Prominence of the left superior mediastinal border. It is unclear if   this represents a paramediastinal mass or lymphadenopathy. Recommend further   evaluation with a CT chest as there are no prior studies for comparison. 2.  Diffuse airspace infiltrates. These are indeterminate this could   represent pneumonia or edema.          US BIOPSY LIVER PERCUTANEOUS    (Results Pending)       IP CONSULT TO CARDIOLOGY  IP CONSULT TO PULMONOLOGY  IP CONSULT TO ONCOLOGY  IP CONSULT TO VASCULAR SURGERY    Assessment/Plan:    Active Hospital Problems    Diagnosis     Pneumonia due to COVID-19 virus [U07.1, J12.82]      Priority: Medium    Lung mass [R91.8]      Priority: Medium    Mediastinal adenopathy [R59.0]      Priority: Medium    Hilar adenopathy [R59.0]      Priority: Medium    Metastasis to liver [facility]) [C78.7]      Priority: Medium    Acute deep vein thrombosis (DVT) of proximal vein of left lower extremity (HCC) [I82.4Y2]      Priority: Medium    Bilateral pulmonary embolism (HCC) [I26.99]      Priority: Medium    Elevated troponin [R77.8]      Priority: Medium    Deep venous thrombosis (HCC) [I82.409]      Priority: Medium    Pericardial effusion [I31.39]      Priority: Medium    Pleural effusion on left [J90]      Priority: Medium    Acute respiratory failure (HCC) [J96.00]      Priority: Medium          Acute hypoxic resp failure- likely multifactorial(pna, PE, ?new lung mass)  -tx underlying isuses  -supp ox, weaned as tolerated  -pulm consulted, apprec recs   -iv dexamethasone started 2/14 for covid     Abn CT- with lung mass/suspected liver mets  -hemonc consulted/pulm consulted  -IR biopsy planned 2/20 after asa washout        Possible pna- ?related to COVID infection, pt is vaccinated with Moderna(4 shots)  -Iv rocephin/azithro continued  -precautions  -Bc pending  -Checked procal and wnl   -vit c/zinc started  -check vit d level(wnl), ldh/crp/esr  -started on iv steroids   -ID consulted given lack of improvement    VTE- with new PE and DVT, POA  -heparin ggt started  -vasc surg consulted by cards, CTPA reviewed, no intervention needed at this time given PE in distal arteries     Elevated BNP/troponin - likely from rt heart strain, related to above  -Asa given  -Echo done(ef 56%, rv pressure overload, rv systolic fcn dec'd, trival circum pericard effusion)  -Cards consulted  -trended angel     Htn-held hctz/arb for now     Hld- on statin     Thyroid- on armour home dosing     Gerd- on ppi        DVT Prophylaxis: heparin ggt  Diet: ADULT DIET;  Regular  Code Status: Full Code    PT/OT Eval Status: ordered 2/16 pm     Dispo - hold aspirin, IR to do biopsy Monday, ID consulted    Appropriate for A1 Discharge Unit: No      Shazia Adler MD

## 2023-02-17 NOTE — PLAN OF CARE
Patient's EF (Ejection Fraction) is greater than 40%    Heart Failure Medications:  Diuretics[de-identified] Furosemide    (One of the following REQUIRED for EF </= 40%/SYSTOLIC FAILURE but MAY be used in EF% >40%/DIASTOLIC FAILURE)        ACE[de-identified] None        ARB[de-identified] None         ARNI[de-identified] None    (Beta Blockers)  NON- Evidenced Based Beta Blocker (for EF% >40%/DIASTOLIC FAILURE): None    Evidenced Based Beta Blocker::(REQUIRED for EF% <40%/SYSTOLIC FAILURE) None  . .................................................................................................................................................. Patient's weights and intake/output reviewed: Yes    Patient's Last Weight: 201 lbs obtained by standing scale. Difference of 4 lbs more than last documented weight. Intake/Output Summary (Last 24 hours) at 2/17/2023 1848  Last data filed at 2/17/2023 1638  Gross per 24 hour   Intake 1980 ml   Output 3450 ml   Net -1470 ml       Education Booklet Provided: yes    Comorbidities Reviewed Yes    Patient has a past medical history of Asthma. >>For CHF and Comorbidity documentation on Education Time and Topics, please see Education Tab    Progressive Mobility Assessment:  What is this patient's Current Level of Mobility?: Ambulatory- with Assistance  How was this patient Mobilized today?: Edge of Bed and Up to Chair, ambulated 10 ft                 With Whom? PCA, PT, and OT                 Level of Difficulty/Assistance: 1x Assist     Pt resting in bed at this time on  5 L O2. Pt denies shortness of breath. Pt without lower extremity edema.      Patient and/or Family's stated Goal of Care this Admission: increase activity tolerance, better understand heart failure and disease management, be more comfortable, and reduce lower extremity edema prior to discharge        :

## 2023-02-17 NOTE — CARE COORDINATION
Chart reviewed. Acute respiratory failure, COVID, DVT, PE, Left lung mass, multiple liver mets, bone mets possible. Management per cardiology, pulmonology, HEMONC and IM. IV ABX, IV steroids. Plan liver biopsy rescheduled for Monday. Plan for MRI brain over the weekend or early next week. Plan for Pt from home with . Lives in [place] and recently went on [facility]). Pt and  have being staying here with daughter and son-in-law. IPTA. Currently on 5 liters oxygen, none at baseline. Denies dcp needs. CM will follow for O2 needs.  Milagros

## 2023-02-17 NOTE — CONSULTS
Infectious Diseases   Consult Note      Reason for Consult: COVID, suspected lung cancer, hypoxemia   Requesting Physician: Dr An Chau       Date of Admission: 2/13/2023  Subjective:   CHIEF COMPLAINT:    HPI:    Jennifer Goodrich is a 69yoF with history of asthma  She lives in [place] and has been in [place] trip. She began to feel SOB sometime early 12/2022, and presumed it was asthma. Symptoms did not improve with MDI and systemic steroids. She was not coughing much. She did not experience fever, chills, night sweats, weight loss. She really cannot point to a time when her symptoms changed or escalated, more of a steady progression over months. She spoke with her son on 2/11/23 and he noted dyspnea in regular conversation and suggested she be evaluated. ED 2/13/23 for evaluation of these issues. She was tachycardic, hypertensive and hypoxemic on arrival.  WBC was elevated at 17. Lactic was 3.7. COVID RT PCR was positive   CT chest with acute elvis PE, large L hilar mass suspicious for bronchogenic carcinoma, mediastinal and hilar LAD, L effusion and liver mass suspicious for metastatic lesion. Doppler with extensive clot in the LLE    She was admitted for management of above and started on rocephin/azithromycin for possible CAP  Was also started on decadron on 2/14/23   Procal on 2/14/23 wa slow at 0.03     She has been seen by Pulm and Oncology  Tentatively, plan for liver biopsy on Monday    She is consistently AF    On 5L NC which has been fairly stable   She indicates she is feeling much better. She is able to converse without dyspnea. She is not coughing. We are consulted for management of COVID and possible pna. Current abx:  Rocephin 1g q24 s 2/14/23  Azithro 250 po dily, s 2/16  Decadron 6mg IV daily        Past Surgical History:       Diagnosis Date    Asthma      History reviewed. No pertinent surgical history.       Social History:    TOBACCO:   reports that she has never smoked. She has never used smokeless tobacco.  ETOH:   reports that she does not currently use alcohol. There is no history of illicit drug use or other significant epidemiologic exposures.       Family History:       Problem Relation Age of Onset    Cancer Paternal Grandmother     Cancer Paternal Grandfather        Current Medications:    Current Facility-Administered Medications: azithromycin (ZITHROMAX) tablet 250 mg, 250 mg, Oral, Daily  docusate sodium (COLACE) capsule 100 mg, 100 mg, Oral, Daily  ascorbic acid (VITAMIN C) tablet 500 mg, 500 mg, Oral, Daily  zinc sulfate (ZINCATE) capsule 50 mg, 50 mg, Oral, QPM  traMADol (ULTRAM) tablet 50 mg, 50 mg, Oral, Q6H PRN  losartan (COZAAR) tablet 50 mg, 50 mg, Oral, BID **AND** hydroCHLOROthiazide (MICROZIDE) capsule 12.5 mg, 12.5 mg, Oral, Daily  pantoprazole (PROTONIX) tablet 40 mg, 40 mg, Oral, QAM AC  dexamethasone (DECADRON) injection 6 mg, 6 mg, IntraVENous, Q24H  melatonin disintegrating tablet 5 mg, 5 mg, Oral, Nightly  diphenhydrAMINE (BENADRYL) tablet 25 mg, 25 mg, Oral, Q6H PRN  heparin (porcine) injection 5,000 Units, 5,000 Units, IntraVENous, PRN  heparin (porcine) injection 2,500 Units, 2,500 Units, IntraVENous, PRN  heparin 25,000 unit in sodium chloride 0.45% 250 mL (premix) infusion, 1,130 Units/hr, IntraVENous, Continuous  0.9 % sodium chloride infusion, 1,000 mL, IntraVENous, Continuous  atorvastatin (LIPITOR) tablet 40 mg, 40 mg, Oral, Nightly  thyroid (ARMOUR) tablet 30 mg, 30 mg, Oral, Once per day on Mon Wed Fri  thyroid (ARMOUR) tablet 60 mg, 60 mg, Oral, Once per day on Sun Tue Thu Sat  sodium chloride flush 0.9 % injection 5-40 mL, 5-40 mL, IntraVENous, 2 times per day  sodium chloride flush 0.9 % injection 5-40 mL, 5-40 mL, IntraVENous, PRN  0.9 % sodium chloride infusion, , IntraVENous, PRN  ondansetron (ZOFRAN-ODT) disintegrating tablet 4 mg, 4 mg, Oral, Q8H PRN **OR** ondansetron (ZOFRAN) injection 4 mg, 4 mg, IntraVENous, Q6H PRN  polyethylene glycol (GLYCOLAX) packet 17 g, 17 g, Oral, Daily PRN  acetaminophen (TYLENOL) tablet 650 mg, 650 mg, Oral, Q6H PRN **OR** acetaminophen (TYLENOL) suppository 650 mg, 650 mg, Rectal, Q6H PRN  cefTRIAXone (ROCEPHIN) 1,000 mg in sodium chloride 0.9 % 50 mL IVPB (mini-bag), 1,000 mg, IntraVENous, Q24H  perflutren lipid microspheres (DEFINITY) injection 1.5 mL, 1.5 mL, IntraVENous, ONCE PRN  ipratropium-albuterol (DUONEB) nebulizer solution 1 ampule, 1 ampule, Inhalation, Q4H PRN      Allergies   Allergen Reactions    Hydrocodone     Propoxyphene     Penicillin G Rash        REVIEW OF SYSTEMS:    CONSTITUTIONAL:   weight has been stable   No fever, night sweats, anorexia   EYES:  negative for blurred vision, eye discharge, visual disturbance and icterus  HEENT:  negative for hearing loss, tinnitus, ear drainage, sinus pressure, nasal congestion, epistaxis and snoring  RESPIRATORY:  No hemoptysis  CARDIOVASCULAR:  negative for chest pain  GASTROINTESTINAL:  negative for nausea, vomiting, diarrhea, constipation, blood in stool and abdominal pain  GENITOURINARY:  negative for frequency, dysuria, urinary incontinence, decreased urine volume, and hematuria  HEMATOLOGIC/LYMPHATIC:  negative for easy bruising, bleeding and lymphadenopathy  ALLERGIC/IMMUNOLOGIC:  negative for recurrent infections, angioedema, anaphylaxis and drug reactions  ENDOCRINE:  negative for weight changes and diabetic symptoms including polyuria, polydipsia and polyphagia  MUSCULOSKELETAL:  negative for acute joint swelling, decreased range of motion and muscle weakness  NEUROLOGICAL:  negative for headaches, slurred speech, unilateral weakness  PSYCHIATRIC/BEHAVIORAL: negative for hallucinations, behavioral problems, confusion and agitation.        Objective:   PHYSICAL EXAM:      VITALS:  BP (!) 149/86   Pulse 91   Temp 98.1 °F (36.7 °C) (Oral)   Resp 16   Ht 5' 1\" (1.549 m)   Wt 201 lb (91.2 kg)   SpO2 91% BMI 37.98 kg/m²      24HR INTAKE/OUTPUT:    Intake/Output Summary (Last 24 hours) at 2/17/2023 1610  Last data filed at 2/17/2023 1416  Gross per 24 hour   Intake 1740 ml   Output 3300 ml   Net -1560 ml     CONSTITUTIONAL:  Awake, alert, cooperative, no apparent distress, and appears stated age  Speaking in full sentences without difficulty   Well appearing   HEENT: NCAT, PERRL, EOMI. Sclera white, conjunctiva full. OP with moist mucosal membranes, no thrush, tongue protrudes midline  NECK:  Supple, symmetrical, trachea midline, no adenopathy  LUNGS:  no increased work of breathing   CTA elvis, decreased R base, no wheezing or rhonchi   CARDIOVASCULAR:  RRR without murmur  ABDOMEN:  normal bowel sounds, soft, flat, NT   PSYCHIATRIC: Oriented to person place and time. No obvious depression or anxiety. MUSCULOSKELETAL: No obvious misalignment or effusion of the joints. No clubbing, cyanosis of the digits. SKIN:  normal skin color, texture, turgor and no redness, warmth, or swelling.  No palpable nodules or stigmata of embolic phenomenon  NEUROLOGIC: nonfocal exam  ACCESS:  PIV site ok       DATA:    Old records have been reviewed    CBC:  Recent Labs     02/15/23  0601 02/16/23  0637 02/17/23  0638   WBC 14.1* 14.2* 13.0*   RBC 3.66* 3.66* 4.00   HGB 10.3* 10.3* 11.0*   HCT 31.4* 31.3* 34.3*    186 228   MCV 85.7 85.4 85.9   MCH 28.1 28.1 27.4   MCHC 32.7 32.9 32.0   RDW 17.4* 17.2* 17.1*   NRBC  --  1* 1*   BANDSPCT  --  2 2      BMP:  Recent Labs     02/15/23  0601 02/16/23  0636 02/17/23  0638    145 145   K 3.3* 3.5 3.6    112* 112*   CO2 21 22 22   BUN 15 17 18   CREATININE 0.7 0.7 0.7   CALCIUM 9.0 9.1 9.3   GLUCOSE 100* 96 94        CRP   Date Value Ref Range Status   02/15/2023 51.2 (H) 0.0 - 5.1 mg/L Final     Comment:     WR-CRP Reference range:  30D-199Y    <5.1  <30D        Not established    CRP is used in the detection and evaluation of infection,  tissue injury, inflammatory disorders, and associated  disease. Increases in CRP values are non-specific and  should not be interpreted without a complete clinical  evaluation.  reference ranges have not been  established and values should be interpreted within clinical  context and with serial measurements, if clinically  appropriate. Cultures:    Flu PCR neg    COVID RT PCR +   BC x2 neg       Radiology Review:  All pertinent images / reports were reviewed as a part of this visit. GODWIN 23   Summary   Left ventricular systolic function is normal with a 3D calculated EF of 56%. The left ventricle is normal in size with mild concentric hypertrophy. Flattened in systole (\"D-shaped septum\") consistent with right ventricular   pressure overload. Normal left ventricular diastolic function. Right ventricular systolic function is reduced. Inadequate tricuspid valve regurgitation to estimate systolic pulmonary   artery pressure. There is a trivial to small circumferential pericardial effusion noted. CTPA 23  Impression   1. Acute bilateral pulmonary embolism is confirmed. 2. No significant right ventricular strain. 3. 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 Pulmonary follow-up is recommended. 4. Mediastinal and bilateral hilar lymphadenopathy. 5. Multiple small pulmonary nodules bilaterally. Pulmonary metastasis cannot   be excluded. 6. Suspected malignant left pleural effusion. 7. Suspected hepatic metastasis. 8. Mosaic attenuation throughout the lungs likely in association with known   pulmonary emboli. 9. Small pericardial effusion noted incidentally. LE doppler   Impressions   Right Impression   There is no evidence of deep venous thrombosis involving the right common   femoral vein.    Left Impression   Acute totally occluding deep vein thrombosis involving the left femoral vein   at the distal thigh, popliteal vein, gastrocnemius veins (4 of 4), posterior   tibial, peroneal and soleal veins. No other evidence of deep vein or superficial vein thrombosis involving the   left lower extremity. There is no previous exam for comparison. Assessment:     Patient Active Problem List   Diagnosis    Acute respiratory failure (HCC)    Acute deep vein thrombosis (DVT) of proximal vein of left lower extremity (HCC)    Bilateral pulmonary embolism (HCC)    Elevated troponin    Deep venous thrombosis (HCC)    Pericardial effusion    Pleural effusion on left    Pneumonia due to COVID-19 virus    Lung mass    Mediastinal adenopathy    Hilar adenopathy    Metastasis to liver (HCC)       Generally healthy 67yoF with hsitory of asthma    Admitted 2/13/23 with 2 month history of progressive SOB, acute hypoxemic respiratory failure which appears to be multifactorial     COVID-19   Unable to pinpoint specific date of symptom onset, but at least 2/11/23  CXR with very typical findings of viral pneumonitis   She has been getting steroid since 2/14/23 appropriately   She remains hypoxemic but is clinically improved  CRP was moderately elevated at 51 on 2/15/23  -continue decadron, change to oral route, plan to complete 10d course, today is day #4/10  -in the context of other factors contributing to hypoxemia, do not see a role for immunomodulatory therapy at this point  -repeat CRP   -outside the window of benefit from antiviral therapy   -isolation x10d dating from 2/11/23 - through EOD 2/20/23  -DC antibacterials on low suspicion of concomitant bacterial pneumonia     PE/DVT, pleural effusion, suspected bronchogenic lung cancer w liver met contributing the presenting illness and managed by Hospitalist, Pulm, Onc    All above d/w patient  Questions addressed    D/w RNCristina MD, M.D. Thank you for the opportunity to participate in the care of your patient.     Please do not hesitate to contact me:   [phone] office

## 2023-02-17 NOTE — PROGRESS NOTES
Will plan for liver biopsy on Monday 2/20. Spoke to nurse to stop heparin at 0300 on 2/20. NPO at midnight Sunday. ASA appears to have been discontinued. ASA needs held 3 days.

## 2023-02-17 NOTE — PROGRESS NOTES
ONCOLOGY HEMATOLOGY CARE PROGRESS NOTE      SUBJECTIVE:    To Castillo is feeling slightly better today with her breathing. Still remains on 5LPM O2 via NC. Appetite reduced overall. ROS:     Constitutional: Fatigue. No fever, No chills, No night sweats. Eyes:  No impairment or change in vision  ENT / Mouth:  No pain, abnormal ulceration, bleeding, nasal drip or change in voice or hearing  Cardiovascular:  No chest pain, palpitations, new edema, or calf discomfort  Respiratory: left sided pleuritic chest pain stable.  No pain, hemoptysis, change to breathing  Breast:  No pain, discharge, change in appearance or texture  Gastrointestinal:  No pain, cramping, jaundice, change to eating and bowel habits  Urinary:  No pain, bleeding or change in continence  Genitalia: No pain, bleeding or discharge  Musculoskeletal:  No redness, pain, edema or weakness  Skin:  No pruritus, rash, change to nodules or lesions  Neurologic:  No discomfort, change in mental status, speech, sensory or motor activity  Psychiatric:  No change in concentration or change to affect or mood  Endocrine:  No hot flashes, increased thirst, or change to urine production  Hematologic: No petechiae, ecchymosis or bleeding  Lymphatic:  No lymphadenopathy or lymphedema  Allergy / Immunologic:  No eczema, hives, frequent or recurrent infections    OBJECTIVE        Physical    VITALS:  Patient Vitals for the past 24 hrs:   BP Temp Temp src Pulse Resp SpO2 Weight   02/17/23 0817 (!) 157/91 98.1 °F (36.7 °C) Oral 76 16 93 % --   02/17/23 0627 -- -- -- -- -- -- 201 lb (91.2 kg)   02/17/23 0307 (!) 164/96 97.7 °F (36.5 °C) Oral 78 18 93 % --   02/17/23 0003 (!) 157/98 97.6 °F (36.4 °C) Oral 79 18 93 % --   02/16/23 2005 (!) 145/80 97.9 °F (36.6 °C) Oral 86 18 -- --   02/16/23 1305 -- -- -- -- 18 -- --   02/16/23 1235 -- -- -- -- 18 -- --   02/16/23 1136 (!) 151/86 97.7 °F (36.5 °C) Oral 87 18 -- --       24HR INTAKE/OUTPUT:    Intake/Output Summary (Last 24 hours) at 2/17/2023 0911  Last data filed at 2/17/2023 0824  Gross per 24 hour   Intake 1040 ml   Output 2000 ml   Net -960 ml       CONSTITUTIONAL: awake, alert, cooperative, resting comfortably in bed. On 5L O2 via NC.   EYES: pupils equal, round and reactive to light, sclera clear and conjunctiva normal  ENT: Normocephalic, without obvious abnormality, atraumatic  NECK: supple, symmetrical, no jugular venous distension. No accessory muscle use. MUSCULOSKELETAL: full range of motion noted, tone is normal  NEUROLOGIC: awake, alert, oriented to name, place and time. Motor skills grossly intact. SKIN: Normal skin color, texture, turgor and no jaundice.  appears intact   EXTREMITIES: no LE edema     DATA:  CBC:    Recent Labs     02/17/23  0638 02/16/23  0637 02/15/23  0601 02/14/23  0357   WBC 13.0* 14.2* 14.1* 17.3*   NEUTROABS 9.9* 10.9* 11.2* 13.6*   LYMPHOPCT 18.0 19.0 12.7 12.9   RBC 4.00 3.66* 3.66* 4.05   HGB 11.0* 10.3* 10.3* 11.2*   HCT 34.3* 31.3* 31.4* 34.1*   MCV 85.9 85.4 85.7 84.2   MCH 27.4 28.1 28.1 27.6   MCHC 32.0 32.9 32.7 32.7   RDW 17.1* 17.2* 17.4* 17.7*    186 157 150       PT/INR:    Recent Labs     02/15/23  0601 02/13/23  1112   PROT  --  7.2   INR 1.16*  --      PTT:    Recent Labs     02/13/23  1305   APTT 26.5       CMP:    Recent Labs     02/17/23  0638 02/16/23  0636 02/15/23  0601 02/14/23  0357 02/13/23  1112    145 144 141 141   K 3.6 3.5 3.3* 3.4* 3.3*   * 112* 110 106 101   CO2 22 22 21 23 24   GLUCOSE 94 96 100* 97 131*   BUN 18 17 15 20 22*   CREATININE 0.7 0.7 0.7 0.7 0.8   LABGLOM >60 >60 >60 >60 >60   CALCIUM 9.3 9.1 9.0 9.1 10.4   PROT  --   --   --   --  7.2   LABALBU  --   --   --   --  4.0   AGRATIO  --   --   --   --  1.3   BILITOT  --   --   --   --  0.9   ALKPHOS  --   --   --   --  351*   ALT  --   --   --   --  21   AST  --   --   --   --  34   MG  --  2.10 2.30 2.30  --        Lab Results Component Value Date    CALCIUM 9.3 02/17/2023       LDH:  Recent Labs     02/15/23  0601   *       Radiology Review:  XR CHEST PORTABLE  Narrative: EXAMINATION:  ONE XRAY VIEW OF THE CHEST    2/16/2023 12:57 pm    COMPARISON:  02/13/2023    HISTORY:  ORDERING SYSTEM PROVIDED HISTORY: L pleural effusion, pleutici CP, hypoxia. ? Increased effusion  TECHNOLOGIST PROVIDED HISTORY:  Reason for exam:->L pleural effusion, pleutici CP, hypoxia. ? Increased  effusion  Reason for Exam: SOB    FINDINGS:  Heart size is stable. Mediastinal contours are unchanged, including left  suprahilar opacity. Patchy interstitial and airspace opacities in the lungs  are unchanged. No evidence of pneumothorax or sizable pleural effusion. No  free air beneath the diaphragm. Impression: No significant interval change. Patchy bilateral interstitial and airspace  opacities. Left suprahilar opacity. Problem List  Patient Active Problem List   Diagnosis    Acute respiratory failure (HCC)    Acute deep vein thrombosis (DVT) of proximal vein of left lower extremity (HCC)    Bilateral pulmonary embolism (HCC)    Elevated troponin    Deep venous thrombosis (HCC)    Pericardial effusion    Pleural effusion on left    Pneumonia due to COVID-19 virus    Lung mass    Mediastinal adenopathy    Hilar adenopathy    Metastasis to liver [facility])       ASSESSMENT AND PLAN:    1.) Left lung mass  - CTPA, 2/13/2023, showed a 3.5 x 3.5 cm left hilar mass, left pleural effusion, multiple liver mets. Bone mets seemed likely. - Tentatively scheduled for Monday 02/20. Hold aspirin. NPO after MN on sunday. Stop UFH 4 hours prior to biopsy. Resume 4 hours after biopsy.  - PET/CT as an outpatient. MRI brain over the weekend or early next week. - Caris tumor profiling will be requested after the biopsy has been obtained.   - Discussed plans with patient and her      2.) PE  - hypercoagulable provocation from likely underlying malignancy   - CTPA, 2/13/2023, showed bilateral PEs.  - On UFH. Stay on this for now.     3.) SARS-CoV-2  - SARS-CoV-2 PCR positive, 2/13/2023.  - CTPA, 2/13/2023, showed bilateral diffuse patchy GGO.  - She is on 5 LPM by NC (stable). - Started Dex, 2/14/2023. - Ceftriaxone/Azithro for potential bacterial superinfection.      4.) Essential hypertension     5.) Hyperlipidemia     6.) Hypothyroidism    ONCOLOGIC DISPOSITION:      FADIA BARLOW  Please contact through [employer]

## 2023-02-17 NOTE — PROGRESS NOTES
INPATIENT PULMONARY CRITICAL CARE PROGRESS NOTE      Reason for visit: Acute hypoxemic respiratory failure, COVID-19 infection with likely COVID-19 pneumonia, left lung mass, mediastinal and hilar adenopathy, left pleural effusion, extensive left leg DVT, PE.    SUBJECTIVE: The patient is feeling slightly better with regards to her respiratory status, looks better. She remains afebrile and hemodynamically stable. She is in no respiratory distress. She is on oxygen at 5 LPM with SaO2 93%. Appetite is fair, she did have a bowel movement. She denies other complaints. Physical Exam:  Blood pressure (!) 149/86, pulse 91, temperature 98.1 °F (36.7 °C), temperature source Oral, resp. rate 16, height 5' 1\" (1.549 m), weight 201 lb (91.2 kg), SpO2 91 %.'   Constitutional: Very pleasant, overweight, on supplemental oxygen but in no acute distress. HENT:  Oropharynx is clear and moist.  Class III airway, dental repair  Eyes:  Conjunctivae are normal. No scleral icterus. Glasses  Neck: Relatively short neck. No JVD  Cardiovascular: Sinus rhythm, normal heart sounds. No lower extremity edema. Pulmonary/Chest: No wheezes. No rales. No accessory muscle usage or stridor. Abdominal: Deferred. Musculoskeletal: No cyanosis. No clubbing. No obvious joint deformity. Lymphadenopathy: Deferred    Skin: Skin is warm and dry. No rash or nodules on the exposed extremities. Psychiatric: Normal mood and affect. Behavior is normal.  No anxiety. Neurologic: Alert, awake and oriented. PERRL. Speech fluent.   No obvious neurologic deficit, detailed exam not performed       Results:  CBC:   Recent Labs     02/15/23  0601 02/16/23  0637 02/17/23  0638   WBC 14.1* 14.2* 13.0*   HGB 10.3* 10.3* 11.0*   HCT 31.4* 31.3* 34.3*   MCV 85.7 85.4 85.9    186 228     BMP:   Recent Labs     02/15/23  0601 02/16/23  0636 02/17/23  0638    145 145   K 3.3* 3.5 3.6    112* 112*   CO2 21 22 22   BUN 15 17 18 CREATININE 0.7 0.7 0.7     PT/INR:   Recent Labs     02/15/23  0601   PROTIME 14.8*   INR 1.16*     APTT:   No results for input(s): APTT in the last 72 hours. Imaging:  I have reviewed radiology images personally. XR CHEST PORTABLE   Final Result   No significant interval change. Patchy bilateral interstitial and airspace   opacities. Left suprahilar opacity. CT CHEST PULMONARY EMBOLISM W CONTRAST   Final Result   1. Acute bilateral pulmonary embolism is confirmed. 2. No significant right ventricular strain. 3. 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 Pulmonary follow-up is recommended. 4. Mediastinal and bilateral hilar lymphadenopathy. 5. Multiple small pulmonary nodules bilaterally. Pulmonary metastasis cannot   be excluded. 6. Suspected malignant left pleural effusion. 7. Suspected hepatic metastasis. 8. Mosaic attenuation throughout the lungs likely in association with known   pulmonary emboli. 9. Small pericardial effusion noted incidentally. Findings were discussed with the emergency room clinician at 1:15 pm on   2/13/2023. VL Extremity Venous Left   Final Result      XR CHEST PORTABLE   Final Result   1. Prominence of the left superior mediastinal border. It is unclear if   this represents a paramediastinal mass or lymphadenopathy. Recommend further   evaluation with a CT chest as there are no prior studies for comparison. 2.  Diffuse airspace infiltrates. These are indeterminate this could   represent pneumonia or edema.          US BIOPSY LIVER PERCUTANEOUS    (Results Pending)     VL Extremity Venous Left    Result Date: 2/13/2023  Lower Extremities DVT Study  Demographics   Patient Name      David Ulloa   Date of Study     02/13/2023          Gender              Female   Patient Number    [id number]          Date of Birth       1955   Visit Number      [id number]           Age 79 year(s)   Accession Number  [id number]          Room Number         07   Corporate ID      [id number]           [address], [place],                                                            [address], [place]  Interpreting        [facility]  Physician                             Physician           Readers                                                            Sourav Montiel MD  Procedure Type of Study:   Veins:Lower Extremities DVT Study, VL EXTREMITY VENOUS DUPLEX LEFT. Vascular Sonographer Report  Indications for Study:Swelling and Leg pain. Additional Indications:Patient scanned bedside in ED with complaints of left lower extremity swelling for 1 week with shortness of breath. Patient noted pain behind knee over the past two days. Recent travel where patient was sedentary for over 8 hours. Venous Duplex Scan: B-mode imaging of the deep and superficial veins, with compression maneuvers, including color and Doppler spectral waveform analysis. Impressions Right Impression There is no evidence of deep venous thrombosis involving the right common femoral vein. Left Impression Acute totally occluding deep vein thrombosis involving the left femoral vein at the distal thigh, popliteal vein, gastrocnemius veins (4 of 4), posterior tibial, peroneal and soleal veins. No other evidence of deep vein or superficial vein thrombosis involving the left lower extremity. There is no previous exam for comparison. Conclusions   Summary   Extensive acute deep vein thrombosis involving the left femoral vein,  popliteal vein, gastrocnemius veins, posterior tibial, peroneal and soleal  veins.    Signature   ------------------------------------------------------------------  Electronically signed by Sourav Montiel MD (Interpreting  physician) on 02/13/2023 at 04:27 PM ------------------------------------------------------------------  Patient   XR CHEST PORTABLE    Result Date: 2/13/2023  EXAMINATION: ONE XRAY VIEW OF THE CHEST 2/13/2023 10:50 am COMPARISON: None. HISTORY: ORDERING SYSTEM PROVIDED HISTORY: sob TECHNOLOGIST PROVIDED HISTORY: Reason for exam:->sob Reason for Exam: sob FINDINGS: Diffuse airspace infiltrates. Prominence of the left superior mediastinal border. No pneumothorax or pleural effusion     1. Prominence of the left superior mediastinal border. It is unclear if this represents a paramediastinal mass or lymphadenopathy. Recommend further evaluation with a CT chest as there are no prior studies for comparison. 2.  Diffuse airspace infiltrates. These are indeterminate this could represent pneumonia or edema. CT CHEST PULMONARY EMBOLISM W CONTRAST    Result Date: 2/13/2023  EXAMINATION: CTA OF THE CHEST 2/13/2023 12:19 pm TECHNIQUE: CTA of the chest was performed after the administration of intravenous contrast.  Multiplanar reformatted images are provided for review. MIP images are provided for review. Automated exposure control, iterative reconstruction, and/or weight based adjustment of the mA/kV was utilized to reduce the radiation dose to as low as reasonably achievable. COMPARISON: None. HISTORY: ORDERING SYSTEM PROVIDED HISTORY: sob/tachy/abnl cxr TECHNOLOGIST PROVIDED HISTORY: Reason for exam:->sob/tachy/abnl cxr Decision Support Exception - unselect if not a suspected or confirmed emergency medical condition->Emergency Medical Condition (MA) Reason for Exam: chest pain in LT lower anterior chest, SOB,  dry cough , dizziness symptoms x 4-6 weeks but gerttting worse. pt did test (+) for COVID today   pt has had COVID one previous time with no lasting complications Relevant Medical/Surgical History: HTN,  GB removed FINDINGS: Pulmonary Arteries: The study is of good technical quality. Acute bilateral pulmonary embolism is confirmed.   A filling defect is observed within the left upper lobar pulmonary artery. Filling defect also demonstrated within posterior segmental branches of the right lower lobar pulmonary artery. Poor characterization of the right upper lobar pulmonary artery due to dense beam hardening artifact within the SVC. However, there does appear to be a filling defect within an anterior segmental branch of the right upper lobar pulmonary artery as well. RV: LV ratio 1.2. Mediastinum: Mild cardiomegaly. Small pericardial effusion. Normal aorta. Thyroid and esophagus normal.  Extensive mediastinal and hilar lymphadenopathy. Index right paratracheal lymph node measures 13 mm. Lungs/pleura: The airways are patent. Small left pleural effusion. There is a mass in the left pulmonary hilum extending into the upper lobe. Best approximate measurements 3.5 x 3.5 cm. Of note, this mass appears to cross the major fissure involving the superior segment of the left lower lobe as well and insinuates within the left pulmonary hilum. Additional solid pulmonary nodules are identified in the bilateral lower lobes and right upper lobe. An index nodule in the right upper lobe measures 7 x 5 mm. Mosaic pattern of ground-glass opacification in the lungs diffusely. Interlobular septal thickening is also demonstrated. Upper Abdomen: Multiple low attenuating masses are demonstrated within the liver. These cannot be characterized on this angiographic study of the chest.  The largest nodule in the right hepatic lobe measures 3.2 cm. Soft Tissues/Bones: No skeletal abnormalities throughout the chest.     1. Acute bilateral pulmonary embolism is confirmed. 2. No significant right ventricular strain. 3. Large mass centered in the left pulmonary hilum, suspected to represent bronchogenic carcinoma. Pulmonary follow-up is recommended. 4. Mediastinal and bilateral hilar lymphadenopathy. 5. Multiple small pulmonary nodules bilaterally.   Pulmonary metastasis cannot be excluded. 6. Suspected malignant left pleural effusion. 7. Suspected hepatic metastasis. 8. Mosaic attenuation throughout the lungs likely in association with known pulmonary emboli. 9. Small pericardial effusion noted incidentally. Findings were discussed with the emergency room clinician at 1:15 pm on 2/13/2023. Assessment and plan:  Acute respiratory failure, hypoxemic. Probably related to pneumonia process in the lungs, potentially due to COVID-19 infection. Oxygen to keep SaO2 >90%  Pulmonary embolism. On heparin, echocardiogram with septal flattening suggestive of RV pressure overload  DVT. Extensive clot in the left leg. On heparin. No obvious risk factors, could be hypercoagulable from likely underlying malignancy, less likely from COVID-19 infection  Lung mass. Proximal mass with adenopathy, could represent small cell lung cancer. The patient is aware that this is a suspected diagnosis, as is her . Hilar and mediastinal adenopathy. Could be a candidate for EBUS, but I believe the liver is a better target for biopsy. Pleural effusion. Likely malignant, small. Not sufficient to yield on cytology to make a definitive diagnosis in many patients. CXR did not show increase in effusion  Liver lesions. Likely metastatic. IR planning biopsy of the liver lesion under ultrasound on Monday. Nursing aware to hold heparin early Monday a.m. Pericardial effusion. Small, unclear etiology  COVID-19 infection, likely COVID 19 pneumonia. Usually mild course after immunization, the older vaccines would not protect against the newer strains. On dexamethasone. Will consult ID, discussed with Dr. Elizabeth Davis  Leukocytosis. Empiric antibiotic for now. Bacterial pneumonia cannot be ruled out. Slightly lower. Change azithromycin to oral  Normochromic, normocytic anemia. Mild, follow H&H. Stable for now  Hypokalemia. Resolved  Elevated proBNP.  Imaging does not suggest CHF  Elevated troponin. Likely nonspecific  DVT prophylaxis. On heparin      Discussed with patient, Dr. Rene Salinas, Dr. Patience Flores and nursing.         Electronically signed by:  Riki Gonzalez MD    2/17/2023    1:47 PM.

## 2023-02-18 LAB
ANTI-XA UNFRAC HEPARIN: 0.55 IU/ML (ref 0.3–0.7)
C-REACTIVE PROTEIN: 5.8 MG/L (ref 0–5.1)

## 2023-02-18 PROCEDURE — 97162 PT EVAL MOD COMPLEX 30 MIN: CPT

## 2023-02-18 PROCEDURE — 6370000000 HC RX 637 (ALT 250 FOR IP): Performed by: INTERNAL MEDICINE

## 2023-02-18 PROCEDURE — 86140 C-REACTIVE PROTEIN: CPT

## 2023-02-18 PROCEDURE — 2700000000 HC OXYGEN THERAPY PER DAY

## 2023-02-18 PROCEDURE — 94761 N-INVAS EAR/PLS OXIMETRY MLT: CPT

## 2023-02-18 PROCEDURE — 2500000003 HC RX 250 WO HCPCS: Performed by: INTERNAL MEDICINE

## 2023-02-18 PROCEDURE — 2580000003 HC RX 258: Performed by: INTERNAL MEDICINE

## 2023-02-18 PROCEDURE — 6370000000 HC RX 637 (ALT 250 FOR IP): Performed by: NURSE PRACTITIONER

## 2023-02-18 PROCEDURE — 97116 GAIT TRAINING THERAPY: CPT

## 2023-02-18 PROCEDURE — 99232 SBSQ HOSP IP/OBS MODERATE 35: CPT | Performed by: INTERNAL MEDICINE

## 2023-02-18 PROCEDURE — 1200000000 HC SEMI PRIVATE

## 2023-02-18 PROCEDURE — 85520 HEPARIN ASSAY: CPT

## 2023-02-18 PROCEDURE — 36415 COLL VENOUS BLD VENIPUNCTURE: CPT

## 2023-02-18 PROCEDURE — 6360000002 HC RX W HCPCS: Performed by: INTERNAL MEDICINE

## 2023-02-18 PROCEDURE — 97530 THERAPEUTIC ACTIVITIES: CPT

## 2023-02-18 RX ORDER — DEXAMETHASONE 4 MG/1
6 TABLET ORAL DAILY
Status: DISCONTINUED | OUTPATIENT
Start: 2023-02-19 | End: 2023-02-22

## 2023-02-18 RX ORDER — FUROSEMIDE 10 MG/ML
20 INJECTION INTRAMUSCULAR; INTRAVENOUS DAILY
Status: COMPLETED | OUTPATIENT
Start: 2023-02-19 | End: 2023-02-21

## 2023-02-18 RX ADMIN — HYDROCHLOROTHIAZIDE 12.5 MG: 12.5 CAPSULE ORAL at 07:58

## 2023-02-18 RX ADMIN — HEPARIN SODIUM 1130 UNITS/HR: 10000 INJECTION, SOLUTION INTRAVENOUS at 05:45

## 2023-02-18 RX ADMIN — PANTOPRAZOLE SODIUM 40 MG: 40 TABLET, DELAYED RELEASE ORAL at 05:41

## 2023-02-18 RX ADMIN — Medication 10 ML: at 08:05

## 2023-02-18 RX ADMIN — POTASSIUM BICARBONATE 20 MEQ: 782 TABLET, EFFERVESCENT ORAL at 21:39

## 2023-02-18 RX ADMIN — ATORVASTATIN CALCIUM 40 MG: 40 TABLET, FILM COATED ORAL at 20:27

## 2023-02-18 RX ADMIN — Medication 50 MG: at 17:09

## 2023-02-18 RX ADMIN — DEXAMETHASONE SODIUM PHOSPHATE 6 MG: 4 INJECTION, SOLUTION INTRAMUSCULAR; INTRAVENOUS at 08:02

## 2023-02-18 RX ADMIN — SODIUM CHLORIDE, PRESERVATIVE FREE 10 ML: 5 INJECTION INTRAVENOUS at 20:28

## 2023-02-18 RX ADMIN — Medication 5 MG: at 20:27

## 2023-02-18 RX ADMIN — THYROID, PORCINE 60 MG: 30 TABLET ORAL at 05:41

## 2023-02-18 RX ADMIN — OXYCODONE HYDROCHLORIDE AND ACETAMINOPHEN 500 MG: 500 TABLET ORAL at 07:59

## 2023-02-18 RX ADMIN — SODIUM CHLORIDE, PRESERVATIVE FREE 10 ML: 5 INJECTION INTRAVENOUS at 08:08

## 2023-02-18 RX ADMIN — SODIUM CHLORIDE 1000 ML: 9 INJECTION, SOLUTION INTRAVENOUS at 13:11

## 2023-02-18 RX ADMIN — LOSARTAN POTASSIUM 50 MG: 25 TABLET, FILM COATED ORAL at 20:27

## 2023-02-18 RX ADMIN — LOSARTAN POTASSIUM 50 MG: 25 TABLET, FILM COATED ORAL at 08:00

## 2023-02-18 RX ADMIN — DOCUSATE SODIUM 100 MG: 100 CAPSULE, LIQUID FILLED ORAL at 07:58

## 2023-02-18 RX ADMIN — SODIUM CHLORIDE 1000 ML: 9 INJECTION, SOLUTION INTRAVENOUS at 05:42

## 2023-02-18 ASSESSMENT — PAIN SCALES - GENERAL: PAINLEVEL_OUTOF10: 0

## 2023-02-18 NOTE — PLAN OF CARE
Problem: Discharge Planning  Goal: Discharge to home or other facility with appropriate resources  Outcome: Progressing     Problem: Skin/Tissue Integrity  Goal: Absence of new skin breakdown  Description: 1. Monitor for areas of redness and/or skin breakdown  2. Assess vascular access sites hourly  3. Every 4-6 hours minimum:  Change oxygen saturation probe site  4. Every 4-6 hours:  If on nasal continuous positive airway pressure, respiratory therapy assess nares and determine need for appliance change or resting period.   Outcome: Progressing     Problem: Safety - Adult  Goal: Free from fall injury  Outcome: Progressing     Problem: Pain  Goal: Verbalizes/displays adequate comfort level or baseline comfort level  Outcome: Progressing     Problem: Infection - Adult  Goal: Absence of infection at discharge  Outcome: Progressing     Problem: Respiratory - Adult  Goal: Achieves optimal ventilation and oxygenation  2/18/2023 1041 by Chris Barcenas RN  Outcome: Progressing  Outcome: Progressing  Flowsheets (Taken 2/17/2023 8833)  Achieves optimal ventilation and oxygenation:   Assess for changes in respiratory status   Assess for changes in mentation and behavior   Position to facilitate oxygenation and minimize respiratory effort   Oxygen supplementation based on oxygen saturation or arterial blood gases   Encourage broncho-pulmonary hygiene including cough, deep breathe, incentive spirometry   Respiratory therapy support as indicated     Problem: Cardiovascular - Adult  Goal: Maintains optimal cardiac output and hemodynamic stability  Outcome: Progressing  Goal: Absence of cardiac dysrhythmias or at baseline  Outcome: Progressing     Problem: Musculoskeletal - Adult  Goal: Return mobility to safest level of function  Outcome: Progressing  Goal: Return ADL status to a safe level of function  Outcome: Progressing

## 2023-02-18 NOTE — PLAN OF CARE
Problem: Respiratory - Adult  Goal: Achieves optimal ventilation and oxygenation  Outcome: Progressing  Flowsheets (Taken 2/17/2023 2148)  Achieves optimal ventilation and oxygenation:   Assess for changes in respiratory status   Assess for changes in mentation and behavior   Position to facilitate oxygenation and minimize respiratory effort   Oxygen supplementation based on oxygen saturation or arterial blood gases   Encourage broncho-pulmonary hygiene including cough, deep breathe, incentive spirometry   Respiratory therapy support as indicated

## 2023-02-18 NOTE — PROGRESS NOTES
Hospitalist Progress Note      PCP: YANN Fair - CNP    Date of Admission: 2/13/2023    Chief Complaint: SOB      Hospital Course: reviewed       Subjective:  denies sob currently, currently no family at bedside, remains on 5-6L oxygen , no overnight events         Medications:  Reviewed    Infusion Medications    heparin (PORCINE) Infusion 1,130 Units/hr (02/18/23 0545)    sodium chloride 1,000 mL (02/18/23 0542)    sodium chloride       Scheduled Medications    [START ON 2/19/2023] dexamethasone  6 mg Oral Daily    docusate sodium  100 mg Oral Daily    ascorbic acid  500 mg Oral Daily    zinc sulfate  50 mg Oral QPM    losartan  50 mg Oral BID    And    hydroCHLOROthiazide  12.5 mg Oral Daily    pantoprazole  40 mg Oral QAM AC    melatonin  5 mg Oral Nightly    atorvastatin  40 mg Oral Nightly    thyroid  30 mg Oral Once per day on Mon Wed Fri    thyroid  60 mg Oral Once per day on Sun Tue Thu Sat    sodium chloride flush  5-40 mL IntraVENous 2 times per day     PRN Meds: traMADol, diphenhydrAMINE, heparin (porcine), heparin (porcine), sodium chloride flush, sodium chloride, ondansetron **OR** ondansetron, polyethylene glycol, acetaminophen **OR** acetaminophen, perflutren lipid microspheres, ipratropium-albuterol      Intake/Output Summary (Last 24 hours) at 2/18/2023 1046  Last data filed at 2/18/2023 0754  Gross per 24 hour   Intake 3480 ml   Output 6050 ml   Net -2570 ml       Physical Exam Performed:    BP (!) 152/89   Pulse 79   Temp 97.6 °F (36.4 °C) (Oral)   Resp 18   Ht 5' 1\" (1.549 m)   Wt 198 lb 4.8 oz (89.9 kg)   SpO2 94%   BMI 37.47 kg/m²     General appearance: No apparent distress, appears stated age and cooperative. HEENT: Pupils equal, round, and reactive to light. Conjunctivae/corneas clear. Neck: Supple, with full range of motion. No jugular venous distention. Trachea midline. Respiratory:  inc'd respiratory effort.  Clear to auscultation, bilaterally without Wheezes/Rhonchi. Noted scattered fine rales seem less today  Cardiovascular: Regular rate and rhythm with normal S1/S2 without murmurs, rubs or gallops. Abdomen: Soft, non-tender, non-distended with normal bowel sounds. Musculoskeletal: No clubbing, cyanosis or edema bilaterally. Full range of motion without deformity. Skin: Skin color, texture, turgor normal.  No rashes or lesions. Neurologic:  Neurovascularly intact without any focal sensory/motor deficits. Cranial nerves: II-XII intact, grossly non-focal.  Psychiatric: Alert and oriented, thought content appropriate, normal insight  Capillary Refill: Brisk, 3 seconds, normal   Peripheral Pulses: +2 palpable, equal bilaterally          Labs:   Recent Labs     02/16/23  0637 02/17/23  0638   WBC 14.2* 13.0*   HGB 10.3* 11.0*   HCT 31.3* 34.3*    228     Recent Labs     02/16/23  0636 02/17/23  0638    145   K 3.5 3.6   * 112*   CO2 22 22   BUN 17 18   CREATININE 0.7 0.7   CALCIUM 9.1 9.3     No results for input(s): AST, ALT, BILIDIR, BILITOT, ALKPHOS in the last 72 hours. No results for input(s): INR in the last 72 hours. No results for input(s): Marvie Forget in the last 72 hours. Urinalysis:    No results found for: Lamar Furnish, BACTERIA, RBCUA, BLOODU, SPECGRAV, [place] [address]    Radiology:  XR CHEST PORTABLE   Final Result   No significant interval change. Patchy bilateral interstitial and airspace   opacities. Left suprahilar opacity. CT CHEST PULMONARY EMBOLISM W CONTRAST   Final Result   1. Acute bilateral pulmonary embolism is confirmed. 2. No significant right ventricular strain. 3. 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 Pulmonary follow-up is recommended. 4. Mediastinal and bilateral hilar lymphadenopathy. 5. Multiple small pulmonary nodules bilaterally. Pulmonary metastasis cannot   be excluded. 6. Suspected malignant left pleural effusion.    7. Suspected hepatic metastasis. 8. Mosaic attenuation throughout the lungs likely in association with known   pulmonary emboli. 9. Small pericardial effusion noted incidentally. Findings were discussed with the emergency room clinician at 1:15 pm on   2/13/2023. VL Extremity Venous Left   Final Result      XR CHEST PORTABLE   Final Result   1. Prominence of the left superior mediastinal border. It is unclear if   this represents a paramediastinal mass or lymphadenopathy. Recommend further   evaluation with a CT chest as there are no prior studies for comparison. 2.  Diffuse airspace infiltrates. These are indeterminate this could   represent pneumonia or edema.          CT NEEDLE BIOPSY LIVER PERCUTANEOUS    (Results Pending)       IP CONSULT TO CARDIOLOGY  IP CONSULT TO PULMONOLOGY  IP CONSULT TO ONCOLOGY  IP CONSULT TO VASCULAR SURGERY  IP CONSULT TO INFECTIOUS DISEASES    Assessment/Plan:    Active Hospital Problems    Diagnosis     COVID [U07.1]      Priority: Medium    Lung mass [R91.8]      Priority: Medium    Mediastinal adenopathy [R59.0]      Priority: Medium    Hilar adenopathy [R59.0]      Priority: Medium    Metastasis to liver [facility]) [C78.7]      Priority: Medium    Acute deep vein thrombosis (DVT) of proximal vein of left lower extremity (HCC) [I82.4Y2]      Priority: Medium    Bilateral pulmonary embolism (HCC) [I26.99]      Priority: Medium    Elevated troponin [R77.8]      Priority: Medium    Deep venous thrombosis (HCC) [I82.409]      Priority: Medium    Pericardial effusion [I31.39]      Priority: Medium    Pleural effusion [J90]      Priority: Medium    Acute respiratory failure (HCC) [J96.00]      Priority: Medium     Acute hypoxic resp failure- likely multifactorial(pna, PE, ?new lung mass)  -tx underlying isuses  -supp ox, weaned as tolerated  -pulm consulted, apprec recs   -iv dexamethasone started 2/14 for covid     Abn CT- with lung mass/suspected liver mets  -hemonc consulted/pulm consulted  -IR biopsy planned 2/20 after asa washout        Possible pna- ?related to COVID infection, pt is vaccinated with Moderna(4 shots)  -Iv rocephin/azithro continued  -precautions  -Bc pending  -Checked procal and wnl   -vit c/zinc started  -check vit d level(wnl), ldh/crp/esr  -started on iv steroids, end on 2/23 for 10 day course   -ID consulted given lack of improvement, no indication for immunomodulators at this time     VTE- with new PE and DVT, POA  -heparin ggt started  -vasc surg consulted by cards, CTPA reviewed, no intervention needed at this time given PE in distal arteries     Elevated BNP/troponin - likely from rt heart strain, related to above  -Asa given  -Echo done(ef 56%, rv pressure overload, rv systolic fcn dec'd, trival circum pericard effusion)  -Cards consulted  -trended angel     Htn-held hctz/arb for now     Hld- on statin     Thyroid- on armour home dosing     Gerd- on ppi        DVT Prophylaxis: heparin ggt  Diet: ADULT DIET;  Regular  Diet NPO Exceptions are: Sips of Water with Meds  Code Status: Full Code  PT/OT Eval Status: ordered 2/16 pm, rec IP rehab     Dispo - continued hold aspirin, IR to do biopsy Monday,     Appropriate for A1 Discharge Unit: No      Katarina Chu MD

## 2023-02-18 NOTE — PROGRESS NOTES
Patient: Danny Toro MRN: [id number]  Date of  Admission: 2/13/2023   YOB: 1955  Age: 79 y.o.   Sex: female    Unit: [address] SURG  Room/Bed: 6091/1366-24 Admitting Physician: Corin Edwards    Attending Physician:  Shyanne Lafleur MD         Pulmonary Service Note      SUBJECTIVE:    Breathing easier  On heparin drip  No chest pains or palpitations  No nausea or vomiting  Sometimes when she starts coughing she will have some headache  Some sinus congestion    ROS:  A comprehensive review of systems was negative except for: Above    OBJECTIVE    Medications    Continuous Infusions:   heparin (PORCINE) Infusion 1,130 Units/hr (02/18/23 0545)    sodium chloride 1,000 mL (02/18/23 0542)    sodium chloride         Scheduled Meds:   [START ON 2/19/2023] dexamethasone  6 mg Oral Daily    docusate sodium  100 mg Oral Daily    ascorbic acid  500 mg Oral Daily    zinc sulfate  50 mg Oral QPM    losartan  50 mg Oral BID    And    hydroCHLOROthiazide  12.5 mg Oral Daily    pantoprazole  40 mg Oral QAM AC    melatonin  5 mg Oral Nightly    atorvastatin  40 mg Oral Nightly    thyroid  30 mg Oral Once per day on Mon Wed Fri    thyroid  60 mg Oral Once per day on Sun Tue Thu Sat    sodium chloride flush  5-40 mL IntraVENous 2 times per day       PRN Meds:  traMADol, diphenhydrAMINE, heparin (porcine), heparin (porcine), sodium chloride flush, sodium chloride, ondansetron **OR** ondansetron, polyethylene glycol, acetaminophen **OR** acetaminophen, perflutren lipid microspheres, ipratropium-albuterol    Physical    Patient Vitals for the past 24 hrs:   BP Temp Temp src Pulse Resp SpO2 Weight   02/18/23 0754 (!) 152/89 97.6 °F (36.4 °C) Oral 79 18 94 % --   02/18/23 0530 -- -- -- -- -- -- 198 lb 4.8 oz (89.9 kg)   02/18/23 0430 137/79 97.5 °F (36.4 °C) Oral 81 20 94 % --   02/17/23 2330 (!) 160/96 98.1 °F (36.7 °C) Oral 88 20 93 % --   02/17/23 2015 (!) 167/99 98 °F (36.7 °C) Oral 97 22 93 % --   02/17/23 1753 139/87 -- -- 91 -- 95 % --   02/17/23 1612 (!) 156/92 97.8 °F (36.6 °C) Oral 95 22 93 % --   02/17/23 1330 (!) 149/86 98.1 °F (36.7 °C) Oral 91 16 91 % --        Physical Exam  Vitals and nursing note reviewed.   Constitutional:       General: She is not in acute distress.     Appearance: Normal appearance. She is obese.   HENT:      Head: Normocephalic and atraumatic.      Right Ear: External ear normal.      Left Ear: External ear normal.      Nose: Nose normal. No congestion.      Mouth/Throat:      Mouth: Mucous membranes are moist.      Pharynx: Oropharynx is clear.   Eyes:      Extraocular Movements: Extraocular movements intact.      Conjunctiva/sclera: Conjunctivae normal.      Pupils: Pupils are equal, round, and reactive to light.   Cardiovascular:      Rate and Rhythm: Normal rate and regular rhythm.      Pulses: Normal pulses.      Heart sounds: Normal heart sounds. No murmur heard.  Pulmonary:      Effort: Pulmonary effort is normal. No respiratory distress.      Breath sounds: Normal breath sounds. No wheezing or rales.   Abdominal:      General: Abdomen is flat. Bowel sounds are normal. There is no distension.      Palpations: Abdomen is soft. There is no mass.      Tenderness: There is no abdominal tenderness. There is no right CVA tenderness, left CVA tenderness, guarding or rebound.      Hernia: No hernia is present.   Musculoskeletal:         General: No swelling. Normal range of motion.      Cervical back: Normal range of motion. No rigidity.   Skin:     General: Skin is warm and dry.      Coloration: Skin is not jaundiced or pale.   Neurological:      General: No focal deficit present.      Mental Status: She is alert and oriented to person, place, and time. Mental status is at baseline.      Cranial Nerves: No cranial nerve deficit.      Motor: No weakness.   Psychiatric:         Mood and Affect: Mood normal.         Behavior: Behavior normal.         Thought Content: Thought content  normal.         Judgment: Judgment normal.            Weight  Weight change: -2 lb 11.2 oz (-1.225 kg)      Labs:   Recent Labs     02/16/23  0637 02/17/23  0638   WBC 14.2* 13.0*   HGB 10.3* 11.0*   HCT 31.3* 34.3*    228      Recent Labs     02/16/23  0636 02/17/23  0638    145   K 3.5 3.6   * 112*   CO2 22 22   BUN 17 18   GLUCOSE 96 94       Additional labs      Radiology, images personally reviewed. Not indicated        Assessment:     Principal Problem:    Acute respiratory failure ([address]  Active Problems:    Acute deep vein thrombosis (DVT) of proximal vein of left lower extremity (HCC)    Bilateral pulmonary embolism (HCC)    Elevated troponin    Deep venous thrombosis (HCC)    Pericardial effusion    Pleural effusion    COVID    Lung mass    Mediastinal adenopathy    Hilar adenopathy    Metastasis to liver [facility])  Resolved Problems:    * No resolved hospital problems. *      Discussion /Plan:     Respiratory failure hypoxemia  Possibly related to infiltrative process in the lung  Secondary to COVID infection  Wean oxygen as tolerated, now at 5 L/min  Having some sinus congestion  We will add Singulair      COVID  Isolation x10 days dating back from 2/11/2023  No need for antibacterials at this time        Acute pulm embolism  Plan Dopplers of the lower extremity also show DVT   Summary        Extensive acute deep vein thrombosis involving the left femoral vein,    popliteal vein, gastrocnemius veins, posterior tibial, peroneal and soleal    veins. CT scan of the chest done 2/13/2023  Impression:        1. Acute bilateral pulmonary embolism is confirmed. 2. No significant right ventricular strain. 3. 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 Pulmonary follow-up is recommended. 4. Mediastinal and bilateral hilar lymphadenopathy. 5. Multiple small pulmonary nodules bilaterally. Pulmonary metastasis cannot   be excluded. 6. Suspected malignant left pleural effusion. 7. Suspected hepatic metastasis. 8. Mosaic attenuation throughout the lungs likely in association with known   pulmonary emboli. 9. Small pericardial effusion noted incidentally. Findings were discussed with the emergency room clinician at 1:15 pm on   2/13/2023   Continue with  Heparin drip  We will need to stop this 3 to 4 hours prior to the biopsy of the liver which will take place on Monday        Lung mass/mediastinal adenopathy  Multiple possible metastatic lesions  Plan is to have a CT-guided biopsy of the liver to be done next week      Discussed with  at the bedside      Kate Moncada MD    [facility] Pulmonary, Critical Care and Sleep Medicine  [phone]      Please note that some or all of this record was generated using voice recognition software. If there are any questions about the content of this document, please contact the author as some errors in transcription may have occurred.

## 2023-02-18 NOTE — PROGRESS NOTES
Physical Therapy  Facility/Department: [place] [address] - MED SURG  Physical Therapy Initial Assessment    Name: Renan Cruz  : 1955  MRN: [id number]  Date of Service: 2023    Discharge Recommendations:  IP Rehab   PT Equipment Recommendations  Equipment Needed: No      Patient Diagnosis(es): The primary encounter diagnosis was Bilateral pulmonary embolism ([address] Diagnoses of Acute deep vein thrombosis (DVT) of proximal vein of left lower extremity ([address] Lung mass, Liver lesion, Pericardial effusion, Pleural effusion, Elevated troponin, Elevated brain natriuretic peptide (BNP) level, COVID, and Hypoxia were also pertinent to this visit. Past Medical History:  has a past medical history of Asthma. Past Surgical History:  has no past surgical history on file. Assessment   Body Structures, Functions, Activity Limitations Requiring Skilled Therapeutic Intervention: Decreased functional mobility ; Decreased endurance;Decreased strength;Decreased balance  Assessment: pt is 79year old female admitted to [facility] with dx of acute resipratory failure, covid. pt having biopsy of liver mass on Monday. pt lives in [place], visiting family in [place]. prior to admission, ind with no AD, active lifestule and participates in [employer] trips.  pt currently functioning below baseline, requires CGA for bed mobility, CGA transfers, gait up to 9 ft prior to fatigue on 5 Lo2 with use of RW. pt fatigues quickly, desats to mid 80s on 5 Lo2 and HR increases to 120s bpm. due to pt's high PLOF and deconditioning, rec IPR vs home wtih 24/ upon dc. if pt d/c home, would benfit from RW  Treatment Diagnosis: impaired endurance  Therapy Prognosis: Fair  Decision Making: Medium Complexity  Activity Tolerance  Activity Tolerance: Patient limited by endurance     Plan   Physcial Therapy Plan  General Plan: 3-5 times per week  Current Treatment Recommendations: Strengthening, Balance training, Wheelchair mobility training, Gait training, Home exercise program, Safety education & training, Therapeutic activities, Functional mobility training, Stair training, Neuromuscular re-education, Patient/Caregiver education & training, Transfer training, ADL/Self-care training, IADL training, Endurance training, Return to work related activity, Positioning, Equipment evaluation, education, & procurement, Modalities, Pain management, Cognitive/Perceptual training  Safety Devices  Type of Devices: Call light within reach, Left in bed, Gait belt, Nurse notified, Patient at risk for falls, Bed alarm in place  Restraints  Restraints Initially in Place: No     Restrictions  Restrictions/Precautions  Restrictions/Precautions: General Precautions, Fall Risk  Position Activity Restriction  Other position/activity restrictions: tele, 5L O2     Subjective   Pain: Pt denies pain at rest  General  Chart Reviewed: Yes  Patient assessed for rehabilitation services?: Yes  Response To Previous Treatment: Not applicable  Family / Caregiver Present: No  Referring Practitioner: Dominic Greene RN  Referral Date : 02/17/23  Diagnosis: Covid  Follows Commands: Within Functional Limits  General Comment  Comments: Rn cleared pt to participate  Subjective  Subjective: pt pleasant and agreeable, found in bed.  denies pain overall         Social/Functional History  Social/Functional History  Lives With: Family, Daughter, Spouse  Type of Home: House  Home Layout: Multi-level, Bed/Bath upstairs, 1/2 bath on main level  Home Access: Stairs to enter with rails  Entrance Stairs - Number of Steps: 3+1  Entrance Stairs - Rails: Left  Bathroom Shower/Tub: Tub/Shower unit, Walk-in shower  Bathroom Toilet: Handicap height  Bathroom Equipment: Hand-held shower  Bathroom Accessibility: Not accessible  Home Equipment:  (no AD)  Has the patient had two or more falls in the past year or any fall with injury in the past year?: No  ADL Assistance: Independent  Homemaking Assistance: Independent  Ambulation Assistance: Independent  Transfer Assistance: Independent  Active : Yes  Mode of Transportation: [address]: puzzles, word search, participate in missions with the [employer]  Additional Comments: Pt reporting from [place], pt's spouse present and reporting able to provide 24H supv/assist at discharge  Vision/Hearing  Vision  Vision: Impaired  Vision Exceptions: Wears glasses at all times  Hearing  Hearing: Exceptions to [facility]  Hearing Exceptions: Left hearing aid;Hard of hearing/hearing concerns    Cognition   Orientation  Overall Orientation Status: Within Functional Limits  Orientation Level: Oriented X4     Objective   Heart Rate: 100  Heart Rate Source: Monitor  BP: (!) 150/78  BP Location: Left upper arm  BP Method: Automatic  Patient Position: Semi fowlers  MAP (Calculated): 102  Resp: 18  SpO2: 93 %  O2 Device: Nasal cannula  Comment: 5LO2     Observation/Palpation  Posture: Fair           Strength RLE  Strength RLE: WFL  Comment: grossly 4/5  Strength LLE  Strength LLE: WFL  Comment: grossly 4/5           Bed mobility  Supine to Sit: Contact guard assistance  Sit to Supine: Contact guard assistance  Bed Mobility Comments: with bed controls and bed rail with increased time  Transfers  Sit to Stand: Contact guard assistance  Stand to Sit: Contact guard assistance  Comment: sit to stand EOB to RW x 2 reps, commode to RW with CGA  Ambulation  Surface: Level tile  Device: Rolling Walker  Assistance: Contact guard assistance  Quality of Gait: decreased step length/height partial step through patter  Distance: 18 ft (9+9)  Comments: Spo2 decreases to 85% on 5 Lo2 and HR increases to 125 bpm. returns to > 90% and 105 bpm within 1.5 - 2 min of seated rest and PLB.   More Ambulation?: No     Balance  Posture: Fair  Sitting - Static: Good  Sitting - Dynamic: Good  Standing - Static: Fair  Standing - Dynamic: Fair  Comments: pt requires assist to don brief following commode use , BUE support and CGA for balance                AM-PAC Score  AM-PAC Inpatient Mobility Raw Score : 16 (02/18/23 1147)  AM-PAC Inpatient T-Scale Score : 40.78 (02/18/23 1147)  Mobility Inpatient CMS 0-100% Score: 54.16 (02/18/23 1147)  Mobility Inpatient CMS G-Code Modifier : CK (02/18/23 1147)           Goals  Short Term Goals  Time Frame for Short Term Goals: 10 days 2/28  Short Term Goal 1: pt will complete bed mobility with mod ind  Short Term Goal 2: pt will complete functional transfer with mod ind  Short Term Goal 3: pt will ambulate 50 ft with LRAD and supv  Short Term Goal 4: pt will tolerate 3 steps with  1 HR and supv  Short Term Goal 5: pt will tolerate 10-15 reps of BLE seated/supine ther ex by 2/25 to maintain functional strength  Patient Goals   Patient Goals : \"breathe better\"       Education  Patient Education  Education Given To: Patient  Education Provided: Role of Therapy;Plan of Care  Education Method: Verbal  Barriers to Learning: None  Education Outcome: Verbalized understanding      Therapy Time   Individual Concurrent Group Co-treatment   Time In 1018         Time Out 1056         Minutes 38         Timed Code Treatment Minutes: 28 Minutes       Gayle Sanchez, PT

## 2023-02-18 NOTE — PROGRESS NOTES
Pt awake, a/o x4 in bed. Denies pain/discomfort at present time and no s/s of distress observed. Call light within reach.

## 2023-02-19 LAB
ALBUMIN SERPL-MCNC: 2.9 G/DL (ref 3.4–5)
ANION GAP SERPL CALCULATED.3IONS-SCNC: 10 MMOL/L (ref 3–16)
ANTI-XA UNFRAC HEPARIN: 0.47 IU/ML (ref 0.3–0.7)
BUN BLDV-MCNC: 16 MG/DL (ref 7–20)
CALCIUM SERPL-MCNC: 9.4 MG/DL (ref 8.3–10.6)
CHLORIDE BLD-SCNC: 111 MMOL/L (ref 99–110)
CO2: 24 MMOL/L (ref 21–32)
CREAT SERPL-MCNC: 0.7 MG/DL (ref 0.6–1.2)
GFR SERPL CREATININE-BSD FRML MDRD: >60 ML/MIN/{1.73_M2}
GLUCOSE BLD-MCNC: 90 MG/DL (ref 70–99)
MAGNESIUM: 2.1 MG/DL (ref 1.8–2.4)
PHOSPHORUS: 3.4 MG/DL (ref 2.5–4.9)
POTASSIUM SERPL-SCNC: 3.7 MMOL/L (ref 3.5–5.1)
SODIUM BLD-SCNC: 145 MMOL/L (ref 136–145)

## 2023-02-19 PROCEDURE — 6370000000 HC RX 637 (ALT 250 FOR IP): Performed by: NURSE PRACTITIONER

## 2023-02-19 PROCEDURE — 1200000000 HC SEMI PRIVATE

## 2023-02-19 PROCEDURE — 6370000000 HC RX 637 (ALT 250 FOR IP): Performed by: INTERNAL MEDICINE

## 2023-02-19 PROCEDURE — 94761 N-INVAS EAR/PLS OXIMETRY MLT: CPT

## 2023-02-19 PROCEDURE — 80069 RENAL FUNCTION PANEL: CPT

## 2023-02-19 PROCEDURE — 2500000003 HC RX 250 WO HCPCS: Performed by: INTERNAL MEDICINE

## 2023-02-19 PROCEDURE — 2700000000 HC OXYGEN THERAPY PER DAY

## 2023-02-19 PROCEDURE — 6360000002 HC RX W HCPCS: Performed by: INTERNAL MEDICINE

## 2023-02-19 PROCEDURE — 85520 HEPARIN ASSAY: CPT

## 2023-02-19 PROCEDURE — 99232 SBSQ HOSP IP/OBS MODERATE 35: CPT | Performed by: INTERNAL MEDICINE

## 2023-02-19 PROCEDURE — 2580000003 HC RX 258: Performed by: INTERNAL MEDICINE

## 2023-02-19 PROCEDURE — 36415 COLL VENOUS BLD VENIPUNCTURE: CPT

## 2023-02-19 PROCEDURE — 83735 ASSAY OF MAGNESIUM: CPT

## 2023-02-19 RX ORDER — MECOBALAMIN 5000 MCG
10 TABLET,DISINTEGRATING ORAL NIGHTLY
Status: DISCONTINUED | OUTPATIENT
Start: 2023-02-19 | End: 2023-02-22 | Stop reason: HOSPADM

## 2023-02-19 RX ADMIN — PANTOPRAZOLE SODIUM 40 MG: 40 TABLET, DELAYED RELEASE ORAL at 06:12

## 2023-02-19 RX ADMIN — OXYCODONE HYDROCHLORIDE AND ACETAMINOPHEN 500 MG: 500 TABLET ORAL at 07:42

## 2023-02-19 RX ADMIN — HEPARIN SODIUM 1130 UNITS/HR: 10000 INJECTION, SOLUTION INTRAVENOUS at 06:16

## 2023-02-19 RX ADMIN — DOCUSATE SODIUM 100 MG: 100 CAPSULE, LIQUID FILLED ORAL at 07:44

## 2023-02-19 RX ADMIN — HYDROCHLOROTHIAZIDE 12.5 MG: 12.5 CAPSULE ORAL at 07:57

## 2023-02-19 RX ADMIN — Medication 50 MG: at 17:57

## 2023-02-19 RX ADMIN — SODIUM CHLORIDE, PRESERVATIVE FREE 10 ML: 5 INJECTION INTRAVENOUS at 07:45

## 2023-02-19 RX ADMIN — ATORVASTATIN CALCIUM 40 MG: 40 TABLET, FILM COATED ORAL at 21:13

## 2023-02-19 RX ADMIN — LOSARTAN POTASSIUM 50 MG: 25 TABLET, FILM COATED ORAL at 21:13

## 2023-02-19 RX ADMIN — TRAMADOL HYDROCHLORIDE 50 MG: 50 TABLET, COATED ORAL at 21:13

## 2023-02-19 RX ADMIN — Medication 10 MG: at 21:13

## 2023-02-19 RX ADMIN — THYROID, PORCINE 60 MG: 30 TABLET ORAL at 07:42

## 2023-02-19 RX ADMIN — TRAMADOL HYDROCHLORIDE 50 MG: 50 TABLET, COATED ORAL at 07:57

## 2023-02-19 RX ADMIN — DEXAMETHASONE 6 MG: 4 TABLET ORAL at 07:42

## 2023-02-19 RX ADMIN — LOSARTAN POTASSIUM 50 MG: 25 TABLET, FILM COATED ORAL at 07:43

## 2023-02-19 RX ADMIN — FUROSEMIDE 20 MG: 10 INJECTION, SOLUTION INTRAMUSCULAR; INTRAVENOUS at 07:44

## 2023-02-19 RX ADMIN — POTASSIUM BICARBONATE 20 MEQ: 782 TABLET, EFFERVESCENT ORAL at 07:44

## 2023-02-19 RX ADMIN — SODIUM CHLORIDE, PRESERVATIVE FREE 10 ML: 5 INJECTION INTRAVENOUS at 21:13

## 2023-02-19 ASSESSMENT — PAIN SCALES - GENERAL
PAINLEVEL_OUTOF10: 5

## 2023-02-19 ASSESSMENT — PAIN - FUNCTIONAL ASSESSMENT: PAIN_FUNCTIONAL_ASSESSMENT: PREVENTS OR INTERFERES SOME ACTIVE ACTIVITIES AND ADLS

## 2023-02-19 ASSESSMENT — PAIN DESCRIPTION - PAIN TYPE: TYPE: CHRONIC PAIN

## 2023-02-19 ASSESSMENT — PAIN DESCRIPTION - DESCRIPTORS
DESCRIPTORS: ACHING
DESCRIPTORS: ACHING

## 2023-02-19 ASSESSMENT — PAIN DESCRIPTION - ORIENTATION
ORIENTATION: RIGHT;UPPER
ORIENTATION: RIGHT;UPPER

## 2023-02-19 ASSESSMENT — PAIN DESCRIPTION - LOCATION
LOCATION: ABDOMEN
LOCATION: ABDOMEN

## 2023-02-19 NOTE — PROGRESS NOTES
Hospitalist Progress Note      PCP: Prudencio Pratt, APRN - CNP    Date of Admission: 2/13/2023    Chief Complaint: SOB      Hospital Course: reviewed       Subjective:  denies sob currently,  family at bedside, down to 4L oxygen(from 5-6) , no overnight events      Medications:  Reviewed    Infusion Medications    heparin (PORCINE) Infusion 1,130 Units/hr (02/19/23 0616)    sodium chloride 1,000 mL (02/18/23 1311)    sodium chloride       Scheduled Medications    dexamethasone  6 mg Oral Daily    furosemide  20 mg IntraVENous Daily    potassium bicarb-citric acid  20 mEq Oral Daily    docusate sodium  100 mg Oral Daily    ascorbic acid  500 mg Oral Daily    zinc sulfate  50 mg Oral QPM    losartan  50 mg Oral BID    And    hydroCHLOROthiazide  12.5 mg Oral Daily    pantoprazole  40 mg Oral QAM AC    melatonin  5 mg Oral Nightly    atorvastatin  40 mg Oral Nightly    thyroid  30 mg Oral Once per day on Mon Wed Fri    thyroid  60 mg Oral Once per day on Sun Tue Thu Sat    sodium chloride flush  5-40 mL IntraVENous 2 times per day     PRN Meds: traMADol, diphenhydrAMINE, heparin (porcine), heparin (porcine), sodium chloride flush, sodium chloride, ondansetron **OR** ondansetron, polyethylene glycol, acetaminophen **OR** acetaminophen, perflutren lipid microspheres, ipratropium-albuterol      Intake/Output Summary (Last 24 hours) at 2/19/2023 0900  Last data filed at 2/19/2023 0616  Gross per 24 hour   Intake 2200 ml   Output 5400 ml   Net -3200 ml       Physical Exam Performed:    BP (!) 156/99   Pulse 89   Temp 98 °F (36.7 °C) (Oral)   Resp 16   Ht 5' 1\" (1.549 m)   Wt 198 lb 1.6 oz (89.9 kg)   SpO2 93%   BMI 37.43 kg/m²     General appearance: No apparent distress, appears stated age and cooperative.  HEENT: Pupils equal, round, and reactive to light. Conjunctivae/corneas clear.  Neck: Supple, with full range of motion. No jugular venous distention. Trachea midline.  Respiratory:  improved respiratory  effort. Clear to auscultation, bilaterally without Wheezes/Rhonchi. Noted scattered fine rales seem less today  Cardiovascular: Regular rate and rhythm with normal S1/S2 without murmurs, rubs or gallops. Abdomen: Soft, non-tender, non-distended with normal bowel sounds. Musculoskeletal: No clubbing, cyanosis or edema bilaterally. Full range of motion without deformity. Skin: Skin color, texture, turgor normal.  No rashes or lesions. Neurologic:  Neurovascularly intact without any focal sensory/motor deficits. Cranial nerves: II-XII intact, grossly non-focal.  Psychiatric: Alert and oriented, thought content appropriate, normal insight  Capillary Refill: Brisk, 3 seconds, normal   Peripheral Pulses: +2 palpable, equal bilaterally          Labs:   Recent Labs     02/17/23  0638   WBC 13.0*   HGB 11.0*   HCT 34.3*        Recent Labs     02/17/23  0638 02/19/23  0629    145   K 3.6 3.7   * 111*   CO2 22 24   BUN 18 16   CREATININE 0.7 0.7   CALCIUM 9.3 9.4   PHOS  --  3.4     No results for input(s): AST, ALT, BILIDIR, BILITOT, ALKPHOS in the last 72 hours. No results for input(s): INR in the last 72 hours. No results for input(s): Daniel Sobia in the last 72 hours. Urinalysis:    No results found for: Michaelene Rickey, BACTERIA, RBCUA, BLOODU, SPECGRAV, [address]    Radiology:  XR CHEST PORTABLE   Final Result   No significant interval change. Patchy bilateral interstitial and airspace   opacities. Left suprahilar opacity. CT CHEST PULMONARY EMBOLISM W CONTRAST   Final Result   1. Acute bilateral pulmonary embolism is confirmed. 2. No significant right ventricular strain. 3. 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 Pulmonary follow-up is recommended. 4. Mediastinal and bilateral hilar lymphadenopathy. 5. Multiple small pulmonary nodules bilaterally. Pulmonary metastasis cannot   be excluded.    6. Suspected malignant left pleural effusion. 7. Suspected hepatic metastasis. 8. Mosaic attenuation throughout the lungs likely in association with known   pulmonary emboli. 9. Small pericardial effusion noted incidentally. Findings were discussed with the emergency room clinician at 1:15 pm on   2/13/2023. VL Extremity Venous Left   Final Result      XR CHEST PORTABLE   Final Result   1. Prominence of the left superior mediastinal border. It is unclear if   this represents a paramediastinal mass or lymphadenopathy. Recommend further   evaluation with a CT chest as there are no prior studies for comparison. 2.  Diffuse airspace infiltrates. These are indeterminate this could   represent pneumonia or edema.          CT NEEDLE BIOPSY LIVER PERCUTANEOUS    (Results Pending)       IP CONSULT TO CARDIOLOGY  IP CONSULT TO PULMONOLOGY  IP CONSULT TO ONCOLOGY  IP CONSULT TO VASCULAR SURGERY  IP CONSULT TO INFECTIOUS DISEASES    Assessment/Plan:    Active Hospital Problems    Diagnosis     COVID [U07.1]      Priority: Medium    Lung mass [R91.8]      Priority: Medium    Mediastinal adenopathy [R59.0]      Priority: Medium    Hilar adenopathy [R59.0]      Priority: Medium    Metastasis to liver [facility]) [C78.7]      Priority: Medium    Acute deep vein thrombosis (DVT) of proximal vein of left lower extremity (HCC) [I82.4Y2]      Priority: Medium    Bilateral pulmonary embolism (HCC) [I26.99]      Priority: Medium    Elevated troponin [R77.8]      Priority: Medium    Deep venous thrombosis (HCC) [I82.409]      Priority: Medium    Pericardial effusion [I31.39]      Priority: Medium    Pleural effusion [J90]      Priority: Medium    Acute respiratory failure (HCC) [J96.00]      Priority: Medium       Acute hypoxic resp failure- likely multifactorial(pna, PE, ?new lung mass)  -tx underlying isuses  -supp ox, weaned as tolerated  -pulm consulted, apprec recs   -iv dexamethasone started 2/14 for covid, tx for 10 days Abn CT- with lung mass/suspected liver mets  -hemonc consulted/pulm consulted  -IR biopsy planned 2/20 after asa washout        Possible pna- ?related to COVID infection, pt is vaccinated with Moderna(4 shots)  -Iv rocephin/azithro continued  -precautions  -Bc pending  -Checked procal and wnl   -vit c/zinc started  -check vit d level(wnl), ldh/crp/esr  -started on iv steroids, end on 2/23 for 10 day course   -ID consulted given lack of improvement, no indication for immunomodulators at this time     VTE- with new PE and DVT, POA  -heparin ggt started  -vasc surg consulted by cards, CTPA reviewed, no intervention needed at this time given PE in distal arteries     Elevated BNP/troponin - likely from rt heart strain, related to above  -Asa given  -Echo done(ef 56%, rv pressure overload, rv systolic fcn dec'd, trival circum pericard effusion)  -Cards consulted  -trended angel     Htn-held hctz/arb for now     Hld- on statin     Thyroid- on armour home dosing     Gerd- on ppi        DVT Prophylaxis: heparin ggt  Diet: ADULT DIET;  Regular  Diet NPO Exceptions are: Sips of Water with Meds  Code Status: Full Code    PT/OT Eval Status: ordered 2/16 pm, rec IP rehab     Dispo - continued to hold aspirin, IR to do biopsy Monday,        Appropriate for A1 Discharge Unit: Cecilia Yousif MD

## 2023-02-19 NOTE — PROGRESS NOTES
PM assessment completed and medications given. All standard safety precautions in place. Patient is A&O and denies any needs at this time.

## 2023-02-19 NOTE — PLAN OF CARE
Problem: Discharge Planning  Goal: Discharge to home or other facility with appropriate resources  Outcome: Progressing     Problem: Skin/Tissue Integrity  Goal: Absence of new skin breakdown  Description: 1.  Monitor for areas of redness and/or skin breakdown  2.  Assess vascular access sites hourly  3.  Every 4-6 hours minimum:  Change oxygen saturation probe site  4.  Every 4-6 hours:  If on nasal continuous positive airway pressure, respiratory therapy assess nares and determine need for appliance change or resting period.  Outcome: Progressing     Problem: Safety - Adult  Goal: Free from fall injury  Outcome: Progressing     Problem: Pain  Goal: Verbalizes/displays adequate comfort level or baseline comfort level  Outcome: Progressing     Problem: Infection - Adult  Goal: Absence of infection at discharge  Outcome: Progressing     Problem: Respiratory - Adult  Goal: Achieves optimal ventilation and oxygenation  Outcome: Progressing     Problem: Cardiovascular - Adult  Goal: Maintains optimal cardiac output and hemodynamic stability  Outcome: Progressing  Goal: Absence of cardiac dysrhythmias or at baseline  Outcome: Progressing     Problem: Musculoskeletal - Adult  Goal: Return mobility to safest level of function  Outcome: Progressing  Goal: Return ADL status to a safe level of function  Outcome: Progressing

## 2023-02-19 NOTE — PROGRESS NOTES
Patient: Gretchen Negro MRN: [id number]  Date of  Admission: 2/13/2023   YOB: 1955  Age: 79 y.o.   Sex: female    Unit: [address] SURG  Room/Bed: 8664/8115-42 Admitting Physician: Mila Amin    Attending Physician:  Ahsan Torres MD         Pulmonary Service Note      SUBJECTIVE:    Patient had some nausea this morning  No chest pains or palpitations  No hemoptysis      ROS:  A comprehensive review of systems was negative except for: Above    OBJECTIVE    Medications    Continuous Infusions:   heparin (PORCINE) Infusion 1,130 Units/hr (02/19/23 0616)    sodium chloride Stopped (02/19/23 0730)    sodium chloride         Scheduled Meds:   dexamethasone  6 mg Oral Daily    furosemide  20 mg IntraVENous Daily    potassium bicarb-citric acid  20 mEq Oral Daily    docusate sodium  100 mg Oral Daily    ascorbic acid  500 mg Oral Daily    zinc sulfate  50 mg Oral QPM    losartan  50 mg Oral BID    And    hydroCHLOROthiazide  12.5 mg Oral Daily    pantoprazole  40 mg Oral QAM AC    melatonin  5 mg Oral Nightly    atorvastatin  40 mg Oral Nightly    thyroid  30 mg Oral Once per day on Mon Wed Fri    thyroid  60 mg Oral Once per day on Sun Tue Thu Sat    sodium chloride flush  5-40 mL IntraVENous 2 times per day       PRN Meds:  traMADol, diphenhydrAMINE, heparin (porcine), heparin (porcine), sodium chloride flush, sodium chloride, ondansetron **OR** ondansetron, polyethylene glycol, acetaminophen **OR** acetaminophen, perflutren lipid microspheres, ipratropium-albuterol    Physical    Patient Vitals for the past 24 hrs:   BP Temp Temp src Pulse Resp SpO2 Weight   02/19/23 0820 -- -- -- -- -- 93 % --   02/19/23 0757 -- -- -- -- -- -- 198 lb 1.6 oz (89.9 kg)   02/19/23 0730 (!) 156/99 98 °F (36.7 °C) Oral 89 16 93 % --   02/19/23 0400 (!) 156/78 97.8 °F (36.6 °C) -- 91 17 93 % --   02/19/23 0024 (!) 142/78 97.8 °F (36.6 °C) Oral 84 16 95 % --   02/18/23 2015 (!) 158/81 98.1 °F (36.7 °C) Oral 100 16 93 % --   02/18/23 1700 (!) 149/84 98.2 °F (36.8 °C) Oral 89 16 97 % --   02/18/23 1252 (!) 146/86 98.1 °F (36.7 °C) Oral 98 18 91 % --          Physical Exam  Vitals and nursing note reviewed. Constitutional:       General: She is not in acute distress. Appearance: Normal appearance. She is obese. HENT:      Head: Normocephalic and atraumatic. Right Ear: External ear normal.      Left Ear: External ear normal.      Nose: Nose normal. No congestion. Mouth/Throat:      Mouth: Mucous membranes are moist.      Pharynx: Oropharynx is clear. Eyes:      Extraocular Movements: Extraocular movements intact. Conjunctiva/sclera: Conjunctivae normal.      Pupils: Pupils are equal, round, and reactive to light. Cardiovascular:      Rate and Rhythm: Normal rate and regular rhythm. Pulses: Normal pulses. Heart sounds: Normal heart sounds. No murmur heard. Pulmonary:      Effort: Pulmonary effort is normal. No respiratory distress. Breath sounds: Normal breath sounds. No wheezing or rales. Abdominal:      General: Abdomen is flat. Bowel sounds are normal. There is no distension. Palpations: Abdomen is soft. There is no mass. Tenderness: There is no abdominal tenderness. There is no right CVA tenderness, left CVA tenderness, guarding or rebound. Hernia: No hernia is present. Musculoskeletal:         General: No swelling. Normal range of motion. Cervical back: Normal range of motion. No rigidity. Skin:     General: Skin is warm and dry. Coloration: Skin is not jaundiced or pale. Neurological:      General: No focal deficit present. Mental Status: She is alert and oriented to person, place, and time. Mental status is at baseline. Cranial Nerves: No cranial nerve deficit. Motor: No weakness. Psychiatric:         Mood and Affect: Mood normal.         Behavior: Behavior normal.         Thought Content:  Thought content normal.         Judgment: Judgment normal.            Weight  Weight change:       Labs:   Recent Labs     02/17/23  0638   WBC 13.0*   HGB 11.0*   HCT 34.3*           Recent Labs     02/17/23  0638 02/19/23  0629    145   K 3.6 3.7   * 111*   CO2 22 24   BUN 18 16   GLUCOSE 94 90         Additional labs      Radiology, images personally reviewed. Not indicated        Assessment:     Principal Problem:    Acute respiratory failure ([address]  Active Problems:    Acute deep vein thrombosis (DVT) of proximal vein of left lower extremity (HCC)    Bilateral pulmonary embolism (HCC)    Elevated troponin    Deep venous thrombosis (HCC)    Pericardial effusion    Pleural effusion    COVID    Lung mass    Mediastinal adenopathy    Hilar adenopathy    Metastasis to liver [facility])  Resolved Problems:    * No resolved hospital problems. *      Discussion /Plan:     Respiratory failure hypoxemia  Possibly related to infiltrative process in the lung  Secondary to COVID infection  Wean oxygen as tolerated, now at 5 L/min  Having some sinus congestion    Added yesterday   Singulair      COVID  Isolation x10 days dating back from 2/11/2023  No need for antibacterials at this time        Acute pulm embolism  Plan Dopplers of the lower extremity also show DVT   Summary        Extensive acute deep vein thrombosis involving the left femoral vein,    popliteal vein, gastrocnemius veins, posterior tibial, peroneal and soleal    veins. CT scan of the chest done 2/13/2023  Impression:        1. Acute bilateral pulmonary embolism is confirmed. 2. No significant right ventricular strain. 3. 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 Pulmonary follow-up is recommended. 4. Mediastinal and bilateral hilar lymphadenopathy. 5. Multiple small pulmonary nodules bilaterally. Pulmonary metastasis cannot   be excluded. 6. Suspected malignant left pleural effusion.    7. Suspected hepatic metastasis. 8. Mosaic attenuation throughout the lungs likely in association with known   pulmonary emboli. 9. Small pericardial effusion noted incidentally. Findings were discussed with the emergency room clinician at 1:15 pm on   2/13/2023   Continue with  Heparin drip  We will need to stop this 3 to 4 hours prior to the biopsy of the liver which will take place on Monday        Lung mass/mediastinal adenopathy  Multiple possible metastatic lesions  Plan is to have a CT-guided biopsy of the liver to be done next week  We will have CT-guided liver biopsy done tomorrow morning at 9 AM we will stop heparin no later than 5 AM      Discussed with  at the bedside      Janeth Aguilar, [address] [facility], [address] and Sleep Medicine  [phone]      Please note that some or all of this record was generated using voice recognition software. If there are any questions about the content of this document, please contact the author as some errors in transcription may have occurred.

## 2023-02-19 NOTE — PROGRESS NOTES
Pt awake, a/o x4, Verbal and able to make needs. Medicated for c/o abd pain, see MAR/flowsheet for details.

## 2023-02-19 NOTE — PLAN OF CARE
Problem: Discharge Planning  Goal: Discharge to home or other facility with appropriate resources  2/19/2023 1304 by Felicia Pretty RN  Outcome: Progressing   Problem: Skin/Tissue Integrity  Goal: Absence of new skin breakdown  Description: 1. Monitor for areas of redness and/or skin breakdown  2. Assess vascular access sites hourly  3. Every 4-6 hours minimum:  Change oxygen saturation probe site  4. Every 4-6 hours:  If on nasal continuous positive airway pressure, respiratory therapy assess nares and determine need for appliance change or resting period.   2/19/2023 1304 by Felicia Pretty RN  Outcome: Progressing   Problem: Safety - Adult  Goal: Free from fall injury  2/19/2023 1304 by Felicia Pretty RN  Outcome: Progressing   Problem: Pain  Goal: Verbalizes/displays adequate comfort level or baseline comfort level  2/19/2023 1304 by Felicia Pretty RN  Outcome: Progressing  Problem: Infection - Adult  Goal: Absence of infection at discharge  2/19/2023 1304 by Felicia Pretty RN  Outcome: Progressing  Problem: Respiratory - Adult  Goal: Achieves optimal ventilation and oxygenation  2/19/2023 1304 by Felicia Pretty RN  Outcome: Progressing   Problem: Cardiovascular - Adult  Goal: Maintains optimal cardiac output and hemodynamic stability  2/19/2023 1304 by Felicia Pretty RN  Outcome: Progressing  Goal: Absence of cardiac dysrhythmias or at baseline  2/19/2023 1304 by Felicia Pretty RN  Outcome: Progressing  Problem: Musculoskeletal - Adult  Goal: Return mobility to safest level of function  2/19/2023 1304 by Felicia Pretty RN  Outcome: Progressing  Goal: Return ADL status to a safe level of function  2/19/2023 1304 by Felicia Pretty RN  Outcome: Progressing

## 2023-02-19 NOTE — FLOWSHEET NOTE
02/18/23 2015   Assessment   Charting Type Shift assessment   Psychosocial   Psychosocial (WDL) WDL   Neurological   Neuro (WDL) WDL   Level of Consciousness 0   [place] Coma Scale   Eye Opening 4   Best Verbal Response 5   Best Motor Response 6   [place] Coma Scale Score 15   NIHSS Stroke Scale   NIHSS Stroke Scale Assessed No   HEENT (Head, Ears, Eyes, Nose, & Throat)   HEENT (WDL) WDL   Respiratory   Respiratory (WDL) WDL   Respiratory Pattern Regular   Respiratory Depth Normal   Respiratory Quality/Effort Unlabored   Chest Assessment Chest expansion symmetrical   L Breath Sounds Diminished   R Breath Sounds Diminished   Level of Activity/Mobility 1   Cardiac   Cardiac (WDL) WDL   Cardiac Regularity Regular   Heart Sounds S1, S2   Cardiac Rhythm Sinus rhythm   Rhythm Interpretation   Heart Rate 100   Gastrointestinal   Abdominal (WDL) WDL   Last BM (including prior to admit) 02/18/23   RUQ Bowel Sounds Active   LUQ Bowel Sounds Active   RLQ Bowel Sounds Active   LLQ Bowel Sounds Active   Genitourinary   Genitourinary (WDL) WDL   Urine Assessment   Urine Color Yellow/straw   Urine Appearance Clear   Peripheral Vascular   Peripheral Vascular (WDL) WDL   Dual Clinician Skin Assessment   Dual Skin Assessment (4 Eyes) WDL   Skin Integumentary    Skin Integumentary (WDL) WDL   Care Plan - Skin/Tissue Integrity Goals   Skin Integrity Remains Intact Monitor for areas of redness and/or skin breakdown   Musculoskeletal   Musculoskeletal (WDL) WDL   RUE Weakness   LUE Weakness   RL Extremity Weakness   LL Extremity Weakness   RASS   Merlos Agitation Sedation Scale (RASS) 0

## 2023-02-20 ENCOUNTER — APPOINTMENT (OUTPATIENT)
Dept: CT IMAGING | Age: 68
DRG: 987 | End: 2023-02-20
Payer: COMMERCIAL

## 2023-02-20 LAB
ALBUMIN SERPL-MCNC: 3.5 G/DL (ref 3.4–5)
ANION GAP SERPL CALCULATED.3IONS-SCNC: 14 MMOL/L (ref 3–16)
ANTI-XA UNFRAC HEPARIN: 0.16 IU/ML (ref 0.3–0.7)
ANTI-XA UNFRAC HEPARIN: <0.1 IU/ML (ref 0.3–0.7)
BUN BLDV-MCNC: 18 MG/DL (ref 7–20)
CALCIUM SERPL-MCNC: 9.7 MG/DL (ref 8.3–10.6)
CHLORIDE BLD-SCNC: 107 MMOL/L (ref 99–110)
CO2: 25 MMOL/L (ref 21–32)
CREAT SERPL-MCNC: 0.7 MG/DL (ref 0.6–1.2)
GFR SERPL CREATININE-BSD FRML MDRD: >60 ML/MIN/{1.73_M2}
GLUCOSE BLD-MCNC: 95 MG/DL (ref 70–99)
HCT VFR BLD CALC: 37 % (ref 36–48)
HEMOGLOBIN: 11.6 G/DL (ref 12–16)
INR BLD: 0.99 (ref 0.87–1.14)
MAGNESIUM: 2.2 MG/DL (ref 1.8–2.4)
MCH RBC QN AUTO: 27.1 PG (ref 26–34)
MCHC RBC AUTO-ENTMCNC: 31.5 G/DL (ref 31–36)
MCV RBC AUTO: 85.9 FL (ref 80–100)
PDW BLD-RTO: 17.6 % (ref 12.4–15.4)
PHOSPHORUS: 3.9 MG/DL (ref 2.5–4.9)
PLATELET # BLD: 269 K/UL (ref 135–450)
PMV BLD AUTO: 8.3 FL (ref 5–10.5)
POTASSIUM SERPL-SCNC: 3.8 MMOL/L (ref 3.5–5.1)
PROTHROMBIN TIME: 13 SEC (ref 11.7–14.5)
RBC # BLD: 4.3 M/UL (ref 4–5.2)
REASON FOR REJECTION: NORMAL
REJECTED TEST: NORMAL
SODIUM BLD-SCNC: 146 MMOL/L (ref 136–145)
WBC # BLD: 16.7 K/UL (ref 4–11)

## 2023-02-20 PROCEDURE — 74177 CT ABD & PELVIS W/CONTRAST: CPT

## 2023-02-20 PROCEDURE — 85610 PROTHROMBIN TIME: CPT

## 2023-02-20 PROCEDURE — 77012 CT SCAN FOR NEEDLE BIOPSY: CPT

## 2023-02-20 PROCEDURE — 83735 ASSAY OF MAGNESIUM: CPT

## 2023-02-20 PROCEDURE — 99232 SBSQ HOSP IP/OBS MODERATE 35: CPT | Performed by: INTERNAL MEDICINE

## 2023-02-20 PROCEDURE — 6360000004 HC RX CONTRAST MEDICATION: Performed by: INTERNAL MEDICINE

## 2023-02-20 PROCEDURE — 36415 COLL VENOUS BLD VENIPUNCTURE: CPT

## 2023-02-20 PROCEDURE — 85520 HEPARIN ASSAY: CPT

## 2023-02-20 PROCEDURE — 2500000003 HC RX 250 WO HCPCS: Performed by: INTERNAL MEDICINE

## 2023-02-20 PROCEDURE — 99231 SBSQ HOSP IP/OBS SF/LOW 25: CPT | Performed by: INTERNAL MEDICINE

## 2023-02-20 PROCEDURE — 6360000002 HC RX W HCPCS: Performed by: INTERNAL MEDICINE

## 2023-02-20 PROCEDURE — 85027 COMPLETE CBC AUTOMATED: CPT

## 2023-02-20 PROCEDURE — 1200000000 HC SEMI PRIVATE

## 2023-02-20 PROCEDURE — 6370000000 HC RX 637 (ALT 250 FOR IP): Performed by: INTERNAL MEDICINE

## 2023-02-20 PROCEDURE — 88341 IMHCHEM/IMCYTCHM EA ADD ANTB: CPT

## 2023-02-20 PROCEDURE — 2700000000 HC OXYGEN THERAPY PER DAY

## 2023-02-20 PROCEDURE — 88307 TISSUE EXAM BY PATHOLOGIST: CPT

## 2023-02-20 PROCEDURE — 0QB30ZX EXCISION OF LEFT PELVIC BONE, OPEN APPROACH, DIAGNOSTIC: ICD-10-PCS | Performed by: RADIOLOGY

## 2023-02-20 PROCEDURE — 2709999900 CT BIOPSY DEEP BONE PERCUTANEOUS

## 2023-02-20 PROCEDURE — 88342 IMHCHEM/IMCYTCHM 1ST ANTB: CPT

## 2023-02-20 PROCEDURE — 88311 DECALCIFY TISSUE: CPT

## 2023-02-20 PROCEDURE — 97110 THERAPEUTIC EXERCISES: CPT

## 2023-02-20 PROCEDURE — 80069 RENAL FUNCTION PANEL: CPT

## 2023-02-20 PROCEDURE — 94761 N-INVAS EAR/PLS OXIMETRY MLT: CPT

## 2023-02-20 PROCEDURE — 2580000003 HC RX 258: Performed by: INTERNAL MEDICINE

## 2023-02-20 PROCEDURE — 97530 THERAPEUTIC ACTIVITIES: CPT

## 2023-02-20 PROCEDURE — 6360000002 HC RX W HCPCS: Performed by: RADIOLOGY

## 2023-02-20 PROCEDURE — 6370000000 HC RX 637 (ALT 250 FOR IP): Performed by: NURSE PRACTITIONER

## 2023-02-20 RX ORDER — HEPARIN SODIUM 1000 [USP'U]/ML
5000 INJECTION, SOLUTION INTRAVENOUS; SUBCUTANEOUS ONCE
Status: COMPLETED | OUTPATIENT
Start: 2023-02-20 | End: 2023-02-20

## 2023-02-20 RX ORDER — MIDAZOLAM HYDROCHLORIDE 1 MG/ML
INJECTION INTRAMUSCULAR; INTRAVENOUS
Status: COMPLETED | OUTPATIENT
Start: 2023-02-20 | End: 2023-02-20

## 2023-02-20 RX ORDER — FENTANYL CITRATE 50 UG/ML
INJECTION, SOLUTION INTRAMUSCULAR; INTRAVENOUS
Status: COMPLETED | OUTPATIENT
Start: 2023-02-20 | End: 2023-02-20

## 2023-02-20 RX ADMIN — HEPARIN SODIUM 1130 UNITS/HR: 10000 INJECTION, SOLUTION INTRAVENOUS at 19:45

## 2023-02-20 RX ADMIN — Medication 10 ML: at 09:56

## 2023-02-20 RX ADMIN — ATORVASTATIN CALCIUM 40 MG: 40 TABLET, FILM COATED ORAL at 19:42

## 2023-02-20 RX ADMIN — SODIUM CHLORIDE, PRESERVATIVE FREE 10 ML: 5 INJECTION INTRAVENOUS at 19:42

## 2023-02-20 RX ADMIN — SODIUM CHLORIDE, PRESERVATIVE FREE 10 ML: 5 INJECTION INTRAVENOUS at 09:00

## 2023-02-20 RX ADMIN — Medication 50 MG: at 17:41

## 2023-02-20 RX ADMIN — FUROSEMIDE 20 MG: 10 INJECTION, SOLUTION INTRAMUSCULAR; INTRAVENOUS at 09:55

## 2023-02-20 RX ADMIN — HEPARIN SODIUM 5000 UNITS: 1000 INJECTION INTRAVENOUS; SUBCUTANEOUS at 22:16

## 2023-02-20 RX ADMIN — DEXAMETHASONE 6 MG: 4 TABLET ORAL at 07:29

## 2023-02-20 RX ADMIN — OXYCODONE HYDROCHLORIDE AND ACETAMINOPHEN 500 MG: 500 TABLET ORAL at 07:29

## 2023-02-20 RX ADMIN — HYDROCHLOROTHIAZIDE 12.5 MG: 12.5 CAPSULE ORAL at 09:55

## 2023-02-20 RX ADMIN — THYROID, PORCINE 30 MG: 30 TABLET ORAL at 06:03

## 2023-02-20 RX ADMIN — PANTOPRAZOLE SODIUM 40 MG: 40 TABLET, DELAYED RELEASE ORAL at 06:03

## 2023-02-20 RX ADMIN — LOSARTAN POTASSIUM 50 MG: 25 TABLET, FILM COATED ORAL at 19:42

## 2023-02-20 RX ADMIN — HEPARIN SODIUM 1130 UNITS/HR: 10000 INJECTION, SOLUTION INTRAVENOUS at 15:15

## 2023-02-20 RX ADMIN — DOCUSATE SODIUM 100 MG: 100 CAPSULE, LIQUID FILLED ORAL at 07:28

## 2023-02-20 RX ADMIN — IOPAMIDOL 75 ML: 755 INJECTION, SOLUTION INTRAVENOUS at 08:46

## 2023-02-20 RX ADMIN — MIDAZOLAM 1 MG: 1 INJECTION INTRAMUSCULAR; INTRAVENOUS at 08:56

## 2023-02-20 RX ADMIN — FENTANYL CITRATE 50 MCG: 50 INJECTION INTRAMUSCULAR; INTRAVENOUS at 08:56

## 2023-02-20 RX ADMIN — Medication 10 MG: at 19:42

## 2023-02-20 RX ADMIN — POTASSIUM BICARBONATE 20 MEQ: 782 TABLET, EFFERVESCENT ORAL at 09:55

## 2023-02-20 RX ADMIN — LOSARTAN POTASSIUM 50 MG: 25 TABLET, FILM COATED ORAL at 07:29

## 2023-02-20 RX ADMIN — TRAMADOL HYDROCHLORIDE 50 MG: 50 TABLET, COATED ORAL at 19:54

## 2023-02-20 ASSESSMENT — PAIN SCALES - GENERAL
PAINLEVEL_OUTOF10: 0
PAINLEVEL_OUTOF10: 5
PAINLEVEL_OUTOF10: 0

## 2023-02-20 NOTE — OR NURSING
Can restart Heparin IV: 6 hours after procedure     This RN wasted the following with Micheline Corea.   1 mg Versed  50 mcg Fentanyl

## 2023-02-20 NOTE — PROGRESS NOTES
Hospitalist Progress Note      PCP: Kiet Hinds, YANN - CNP    Date of Admission: 2/13/2023    Chief Complaint: SOB      Hospital Course: reviewed       Subjective:  denies sob currently,  family at bedside, down to 3L oxygen(from 5-6) , no overnight events         Medications:  Reviewed    Infusion Medications    heparin (PORCINE) Infusion Stopped (02/20/23 0300)    sodium chloride Stopped (02/19/23 0730)    sodium chloride       Scheduled Medications    melatonin  10 mg Oral Nightly    dexamethasone  6 mg Oral Daily    furosemide  20 mg IntraVENous Daily    potassium bicarb-citric acid  20 mEq Oral Daily    docusate sodium  100 mg Oral Daily    ascorbic acid  500 mg Oral Daily    zinc sulfate  50 mg Oral QPM    losartan  50 mg Oral BID    And    hydroCHLOROthiazide  12.5 mg Oral Daily    pantoprazole  40 mg Oral QAM AC    atorvastatin  40 mg Oral Nightly    thyroid  30 mg Oral Once per day on Mon Wed Fri    thyroid  60 mg Oral Once per day on Sun Tue Thu Sat    sodium chloride flush  5-40 mL IntraVENous 2 times per day     PRN Meds: traMADol, diphenhydrAMINE, heparin (porcine), heparin (porcine), sodium chloride flush, sodium chloride, ondansetron **OR** ondansetron, polyethylene glycol, acetaminophen **OR** acetaminophen, perflutren lipid microspheres, ipratropium-albuterol    No intake or output data in the 24 hours ending 02/20/23 1003    Physical Exam Performed:    BP (!) 145/88   Pulse (!) 101   Temp 98.1 °F (36.7 °C) (Oral)   Resp 15   Ht 5' 1\" (1.549 m)   Wt 190 lb 14.4 oz (86.6 kg)   SpO2 98%   BMI 36.07 kg/m²     General appearance: No apparent distress, appears stated age and cooperative. HEENT: Pupils equal, round, and reactive to light. Conjunctivae/corneas clear. Neck: Supple, with full range of motion. No jugular venous distention. Trachea midline. Respiratory:  improved respiratory effort. Clear to auscultation, bilaterally without Wheezes/Rhonchi.  Noted scattered fine rales seem less today  Cardiovascular: Regular rate and rhythm with normal S1/S2 without murmurs, rubs or gallops. Abdomen: Soft, non-tender, non-distended with normal bowel sounds. Musculoskeletal: No clubbing, cyanosis or edema bilaterally. Full range of motion without deformity. Skin: Skin color, texture, turgor normal.  No rashes or lesions. Neurologic:  Neurovascularly intact without any focal sensory/motor deficits. Cranial nerves: II-XII intact, grossly non-focal.  Psychiatric: Alert and oriented, thought content appropriate, normal insight  Capillary Refill: Brisk, 3 seconds, normal   Peripheral Pulses: +2 palpable, equal bilaterally       Labs:   Recent Labs     02/20/23  0749   WBC 16.7*   HGB 11.6*   HCT 37.0        Recent Labs     02/19/23  0629 02/20/23  0644    146*   K 3.7 3.8   * 107   CO2 24 25   BUN 16 18   CREATININE 0.7 0.7   CALCIUM 9.4 9.7   PHOS 3.4 3.9     No results for input(s): AST, ALT, BILIDIR, BILITOT, ALKPHOS in the last 72 hours. Recent Labs     02/20/23  0644   INR 0.99     No results for input(s): Tonya Fuse in the last 72 hours. Urinalysis:    No results found for: Sydelle Leny, BACTERIA, RBCUA, BLOODU, SPECGRAV, [address]    Radiology:  CT BIOPSY DEEP BONE PERCUTANEOUS   Final Result   Successful CT guided core biopsy of the iliac bone. CT GUIDED NEEDLE PLACEMENT   Final Result   Successful CT guided core biopsy of the iliac bone. CT ABDOMEN PELVIS W IV CONTRAST Additional Contrast? None   Final Result   Widespread hepatic metastasis. Widespread bony osseous metastatic disease      Dominant cystic nodule in the pelvis. This is not clearly benign. Consider   pelvic ultrasound to rule out any complex internal features. Pleural-parenchymal disease at the lung bases. Please see recent chest CT   report         XR CHEST PORTABLE   Final Result   No significant interval change. Patchy bilateral interstitial and airspace   opacities. Left suprahilar opacity. CT CHEST PULMONARY EMBOLISM W CONTRAST   Final Result   1. Acute bilateral pulmonary embolism is confirmed. 2. No significant right ventricular strain. 3. 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 Pulmonary follow-up is recommended. 4. Mediastinal and bilateral hilar lymphadenopathy. 5. Multiple small pulmonary nodules bilaterally. Pulmonary metastasis cannot   be excluded. 6. Suspected malignant left pleural effusion. 7. Suspected hepatic metastasis. 8. Mosaic attenuation throughout the lungs likely in association with known   pulmonary emboli. 9. Small pericardial effusion noted incidentally. Findings were discussed with the emergency room clinician at 1:15 pm on   2/13/2023. VL Extremity Venous Left   Final Result      XR CHEST PORTABLE   Final Result   1. Prominence of the left superior mediastinal border. It is unclear if   this represents a paramediastinal mass or lymphadenopathy. Recommend further   evaluation with a CT chest as there are no prior studies for comparison. 2.  Diffuse airspace infiltrates. These are indeterminate this could   represent pneumonia or edema.              IP CONSULT TO CARDIOLOGY  IP CONSULT TO PULMONOLOGY  IP CONSULT TO ONCOLOGY  IP CONSULT TO VASCULAR SURGERY  IP CONSULT TO INFECTIOUS DISEASES    Assessment/Plan:    Active Hospital Problems    Diagnosis     COVID [U07.1]      Priority: Medium    Lung mass [R91.8]      Priority: Medium    Mediastinal adenopathy [R59.0]      Priority: Medium    Hilar adenopathy [R59.0]      Priority: Medium    Metastasis to liver [facility]) [C78.7]      Priority: Medium    Acute deep vein thrombosis (DVT) of proximal vein of left lower extremity (HCC) [I82.4Y2]      Priority: Medium    Bilateral pulmonary embolism (HCC) [I26.99]      Priority: Medium    Elevated troponin [R77.8]      Priority: Medium    Deep venous thrombosis [facility]) [I82.409]      Priority: Medium    Pericardial effusion [I31.39]      Priority: Medium    Pleural effusion [J90]      Priority: Medium    Acute respiratory failure (HCC) [J96.00]      Priority: Medium        Acute hypoxic resp failure- likely multifactorial(pna, PE, ?new lung mass)  -tx underlying isuses  -supp ox, weaned as tolerated  -pulm consulted, apprec recs   -iv dexamethasone started 2/14 for covid, tx for 10 days     Abn CT- with lung mass/suspected liver mets  -hemonc consulted/pulm consulted  -IR biopsy done 2/20 of bone lesion(left iliac bone)        Possible pna- ?related to COVID infection, pt is vaccinated with Moderna(4 shots)  -Iv rocephin/azithro continued  -precautions  -Bc pending  -Checked procal and wnl   -vit c/zinc started  -check vit d level(wnl), ldh/crp/esr  -started on iv steroids, end on 2/23 for 10 day course   -ID consulted given lack of improvement, no indication for immunomodulators at this time   -end covid isolation on EOD 2/20/23    VTE- with new PE and DVT, POA  -heparin ggt started  -vasc surg consulted by cards, CTPA reviewed, no intervention needed at this time given PE in distal arteries     Elevated BNP/troponin - likely from rt heart strain, related to above  -Asa given  -Echo done(ef 56%, rv pressure overload, rv systolic fcn dec'd, trival circum pericard effusion)  -Cards consulted  -trended angel     Htn-held hctz/arb for now     Hld- on statin     Thyroid- on armour home dosing     Gerd- on ppi      DVT Prophylaxis: heparin ggt(was on hold for biopsy)  Diet: ADULT DIET;  Regular  Code Status: Full Code  PT/OT Eval Status: ordered 2/16 pm, rec IP rehab    Dispo - pending workup, bx results, pulm/onc recs    Appropriate for A1 Discharge Unit: No      Rhonda Jean MD

## 2023-02-20 NOTE — PLAN OF CARE
Abdomen/ Pelvis CT performed shows multiple bone lesions including iliac crests. As the pt has been on anticoagulation, The bone lesions would be significantly safer to biopsy rather than the liver.

## 2023-02-20 NOTE — PROGRESS NOTES
ONCOLOGY HEMATOLOGY CARE PROGRESS NOTE      SUBJECTIVE:    Titi Dolan had biopsy performed already this morning. She has no new concerns today. On 3L O2 Via NC. Minimal pain at this time. ROS:     Constitutional:  No fever, No chills, No night sweats. Eyes:  No impairment or change in vision  ENT / Mouth:  No pain, abnormal ulceration, bleeding, nasal drip or change in voice or hearing  Cardiovascular:  No chest pain, palpitations, new edema, or calf discomfort  Respiratory:  No pain, hemoptysis, change to breathing  Breast:  No pain, discharge, change in appearance or texture  Gastrointestinal: intermittent abdominal pain.   No cramping, jaundice, change to eating and bowel habits  Urinary:  No pain, bleeding or change in continence  Genitalia: No pain, bleeding or discharge  Musculoskeletal:  No redness, pain, edema or weakness  Skin:  No pruritus, rash, change to nodules or lesions  Neurologic:  No discomfort, change in mental status, speech, sensory or motor activity  Psychiatric:  No change in concentration or change to affect or mood  Endocrine:  No hot flashes, increased thirst, or change to urine production  Hematologic: No petechiae, ecchymosis or bleeding  Lymphatic:  No lymphadenopathy or lymphedema  Allergy / Immunologic:  No eczema, hives, frequent or recurrent infections    OBJECTIVE        Physical    VITALS:  Patient Vitals for the past 24 hrs:   BP Temp Temp src Pulse Resp SpO2 Weight   02/20/23 0725 (!) 159/96 98.1 °F (36.7 °C) Oral 100 20 94 % --   02/20/23 0607 (!) 155/82 98.1 °F (36.7 °C) Oral -- -- 94 % --   02/20/23 0600 -- -- -- -- -- -- 190 lb 14.4 oz (86.6 kg)   02/20/23 0320 (!) 146/88 -- -- 99 -- 94 % --   02/19/23 2355 (!) 143/91 97.9 °F (36.6 °C) Oral 97 16 93 % --   02/19/23 2143 -- -- -- -- 16 -- --   02/19/23 2005 (!) 142/85 98 °F (36.7 °C) Oral (!) 104 18 94 % --   02/19/23 1745 (!) 146/94 97.9 °F (36.6 °C) Oral (!) 102 -- 96 % --   02/19/23 1222 136/82 98.2 °F (36.8 °C) Oral (!) 102 -- 92 % --   02/19/23 0820 -- -- -- -- -- 93 % --       24HR INTAKE/OUTPUT:    Intake/Output Summary (Last 24 hours) at 2/20/2023 1438  Last data filed at 2/19/2023 0958  Gross per 24 hour   Intake 1000 ml   Output --   Net 1000 ml       CONSTITUTIONAL: awake, alert, cooperative, no apparent distress. Lying in bed. EYES: pupils equal, round and reactive to light, sclera clear and conjunctiva normal  ENT: Normocephalic, without obvious abnormality, atraumatic  NECK: supple, symmetrical, no jugular venous distension and no carotid bruits   HEMATOLOGIC/LYMPHATIC: no cervical, supraclavicular or axillary lymphadenopathy   LUNGS: no increased work of breathing and clear to auscultation with no wheezing/rhonchi/rales. CARDIOVASCULAR: regular rate and rhythm, normal S1 and S2, no murmur noted  ABDOMEN: normal bowel sounds x 4, soft, non-distended, non-tender, no masses palpated, no hepatosplenomgaly   MUSCULOSKELETAL: full range of motion noted, tone is normal  NEUROLOGIC: awake, alert, oriented to name, place and time. Motor skills grossly intact. SKIN: Normal skin color, texture, turgor and no jaundice.  appears intact   EXTREMITIES: no LE edema     DATA:  CBC:    Recent Labs     02/20/23  0749 02/17/23  0638 02/16/23  0637 02/15/23  0601 02/14/23  0357   WBC 16.7* 13.0* 14.2* 14.1* 17.3*   NEUTROABS  --  9.9* 10.9* 11.2* 13.6*   LYMPHOPCT  --  18.0 19.0 12.7 12.9   RBC 4.30 4.00 3.66* 3.66* 4.05   HGB 11.6* 11.0* 10.3* 10.3* 11.2*   HCT 37.0 34.3* 31.3* 31.4* 34.1*   MCV 85.9 85.9 85.4 85.7 84.2   MCH 27.1 27.4 28.1 28.1 27.6   MCHC 31.5 32.0 32.9 32.7 32.7   RDW 17.6* 17.1* 17.2* 17.4* 17.7*    228 186 157 150       PT/INR:    Recent Labs     02/20/23  0644 02/15/23  0601 02/13/23  1112   PROT  --   --  7.2   INR 0.99 1.16*  --      PTT:    Recent Labs     02/13/23  1305   APTT 26.5       CMP:    Recent Labs     02/20/23  0644 02/19/23  0629 02/17/23  4294 02/16/23  0636 02/15/23  0601 02/14/23  0357 02/13/23  1112   * 145 145 145 144   < > 141   K 3.8 3.7 3.6 3.5 3.3*   < > 3.3*    111* 112* 112* 110   < > 101   CO2 25 24 22 22 21   < > 24   GLUCOSE 95 90 94 96 100*   < > 131*   BUN 18 16 18 17 15   < > 22*   CREATININE 0.7 0.7 0.7 0.7 0.7   < > 0.8   LABGLOM >60 >60 >60 >60 >60   < > >60   CALCIUM 9.7 9.4 9.3 9.1 9.0   < > 10.4   PROT  --   --   --   --   --   --  7.2   LABALBU 3.5 2.9*  --   --   --   --  4.0   AGRATIO  --   --   --   --   --   --  1.3   BILITOT  --   --   --   --   --   --  0.9   ALKPHOS  --   --   --   --   --   --  351*   ALT  --   --   --   --   --   --  21   AST  --   --   --   --   --   --  34   MG 2.20 2.10  --  2.10 2.30   < >  --     < > = values in this interval not displayed. Lab Results   Component Value Date    CALCIUM 9.7 02/20/2023    PHOS 3.9 02/20/2023       LDH:  Recent Labs     02/15/23  0601   *       Radiology Review:  XR CHEST PORTABLE  Narrative: EXAMINATION:  ONE XRAY VIEW OF THE CHEST    2/16/2023 12:57 pm    COMPARISON:  02/13/2023    HISTORY:  ORDERING SYSTEM PROVIDED HISTORY: L pleural effusion, pleutici CP, hypoxia. ? Increased effusion  TECHNOLOGIST PROVIDED HISTORY:  Reason for exam:->L pleural effusion, pleutici CP, hypoxia. ? Increased  effusion  Reason for Exam: SOB    FINDINGS:  Heart size is stable. Mediastinal contours are unchanged, including left  suprahilar opacity. Patchy interstitial and airspace opacities in the lungs  are unchanged. No evidence of pneumothorax or sizable pleural effusion. No  free air beneath the diaphragm. Impression: No significant interval change. Patchy bilateral interstitial and airspace  opacities. Left suprahilar opacity.       Problem List  Patient Active Problem List   Diagnosis    Acute respiratory failure (HCC)    Acute deep vein thrombosis (DVT) of proximal vein of left lower extremity (HCC)    Bilateral pulmonary embolism (HCC)    Elevated troponin    Deep venous thrombosis (HCC)    Pericardial effusion    Pleural effusion    COVID    Lung mass    Mediastinal adenopathy    Hilar adenopathy    Metastasis to liver [facility])       ASSESSMENT AND PLAN:       1.) Left lung mass  - CTPA, 2/13/2023, showed a 3.5 x 3.5 cm left hilar mass, left pleural effusion, multiple liver mets. Bone mets seemed likely. - had CT guided biopsy of bone lesion this morning. Radiology felt this was safer compared to liver biopsy. Results pending.   - PET/CT as an outpatient. MRI brain over the weekend or early next week. - Caris tumor profiling will be requested after the biopsy has been obtained. - will need outpatient follow up scheduled at discharge      2.) PE  - hypercoagulable provocation from likely underlying malignancy   - CTPA, 2/13/2023, showed bilateral PEs.  - On UFH. Holding 4 hours prior to bx and will restart 3-4 hours post bx     3.) SARS-CoV-2  - SARS-CoV-2 PCR positive, 2/13/2023.  - 10 days isolation dating back to 02/11/2023  - CTPA, 2/13/2023, showed bilateral diffuse patchy GGO.  - She is on 5 LPM by NC (stable).    - Started Dex, 2/14/2023.  - received IV rocephin/azithro     4.) Essential hypertension     5.) Hyperlipidemia     6.) Hypothyroidism    ONCOLOGIC DISPOSITION:      Fabiola Page 226, PA  Please contact through [address]

## 2023-02-20 NOTE — CARE COORDINATION
[facility] - Acute Rehab Unit   After review, this patient is felt to be:       []  Appropriate for Acute Inpatient Rehab    []  Appropriate for Acute Inpatient Rehab Pending Insurance Authorization    []  Not appropriate for Acute Inpatient Rehab    [x]  Referral received and ARU reviewing patient; Evaluation ongoing. Will follow for continued oncology work-up & plan. D/w Analia WARREN. Will notify DCP with further updates.  Thank you for the referral.    Carole [place], JASIEL-SLP

## 2023-02-20 NOTE — PROGRESS NOTES
Comprehensive Nutrition Assessment    Type and Reason for Visit:  Initial, RD Nutrition Re-Screen/LOS    Nutrition Recommendations/Plan:   Continue general diet   Encourage PO intakes   Monitor nutrition adequacy, pertinent labs, bowel habits, wt changes, and clinical progress     Malnutrition Assessment:  Malnutrition Status: At risk for malnutrition (Comment) (02/20/23 1443)      Nutrition Assessment:    80 yo female admitted with acute respiratory failure. New lung mass with possible liver and bone mets. S/p bone bx today. Currently in droplet plus precautions. Spoke with pt via phone. Pt reports good appetite and PO intakes of % per EMR. Encouraged continued good PO intakes. Denied need for ONS. No weight loss this admission, denies any recent weight loss. Will continue to monitor. Nutrition Related Findings:    Active BS. BM on 2/18. Wound Type: None       Current Nutrition Intake & Therapies:    Average Meal Intake: %  Average Supplements Intake: None Ordered  ADULT DIET; Regular    Anthropometric Measures:  Height: 5' 1\" (154.9 cm)  Ideal Body Weight (IBW): 105 lbs (48 kg)    Admission Body Weight: 190 lb (86.2 kg) (standing scale)  Current Body Weight: 190 lb (86.2 kg), 181 % IBW.  Weight Source: Standing Scale  Current BMI (kg/m2): 35.9                          BMI Categories: Obese Class 2 (BMI 35.0 -39.9)    Estimated Daily Nutrient Needs:  Energy Requirements Based On: Kcal/kg (30-35)  Weight Used for Energy Requirements: Ideal  Energy (kcal/day): 4272-6196 kcal  Weight Used for Protein Requirements: Ideal (1.2-1.5 g/kg)  Protein (g/day): 57-72 g  Method Used for Fluid Requirements: 1 ml/kcal  Fluid (ml/day): 3112-1247 mL    Nutrition Diagnosis:   Increased nutrient needs related to increase demand for energy/nutrients as evidenced by  (extended hospital stay)    Nutrition Interventions:   Food and/or Nutrient Delivery: Continue Current Diet  Nutrition Education/Counseling: No recommendation at this time  Coordination of Nutrition Care: Continue to monitor while inpatient       Goals:     Goals: PO intake 50% or greater, prior to discharge       Nutrition Monitoring and Evaluation:   Behavioral-Environmental Outcomes: None Identified  Food/Nutrient Intake Outcomes: Food and Nutrient Intake  Physical Signs/Symptoms Outcomes: Biochemical Data, Nutrition Focused Physical Findings, Weight    Discharge Planning:    Continue current diet     Kyra Conley [address], [address] [place] [address],   Contact: [phone]

## 2023-02-20 NOTE — OR NURSING
Pt arrived for image guided bone lesion biopsy left. Procedure explained including the risk and benefits of the procedure. All questions answered. Pt verbalizes understanding of the of procedure and states no more questions. Consent signed. Vital signs stable, labs, allergies, medications, and code status reviewed. No contraindications noted.      Vitals:    02/20/23 0851   BP: (!) 150/93   Pulse: (!) 101   Resp: 16   Temp:    SpO2: 97%    (vital signs in table format)    Kaykay Score  2 - Able to move 4 extremities voluntarily on command  2 - BP+/- 20mmHg of normal  2 - Fully awake  1 - Needs oxygen to maintain oxygen saturation >90%  2 - Able to breathe deeply and cough freely    Allergies  Hydrocodone, Propoxyphene, and Penicillin g    Labs  Lab Results   Component Value Date    INR 0.99 02/20/2023    PROTIME 13.0 02/20/2023       Lab Results   Component Value Date    CREATININE 0.7 02/20/2023    BUN 18 02/20/2023     (H) 02/20/2023    K 3.8 02/20/2023     02/20/2023    CO2 25 02/20/2023       Lab Results   Component Value Date    WBC 16.7 (H) 02/20/2023    HGB 11.6 (L) 02/20/2023    HCT 37.0 02/20/2023    MCV 85.9 02/20/2023     02/20/2023

## 2023-02-20 NOTE — OR NURSING
Image guided bone lesion biopsy biopsy completed by Dr. Nicola Ag. Pt tolerated procedure without any signs or symptoms of distress. Vital signs stable. Report given  to  RN. Pt transported [facility] to [facility] in stable condition via stretcher. Total Biopsy: 1  Received: Versed: 1 mg       Fentanyl: 50 mcg  Bandage to left lower backl that is clean dry and intact. Patient to lay on back side for 1 hour.      Vital Signs  Vitals:    02/20/23 0903   BP: (!) 145/88   Pulse: (!) 101   Resp: 15   Temp:    SpO2: 98%    (vital signs in table format)    Post Kaykay  2 - Able to move 4 extremities voluntarily on command  2 - BP+/- 20mmHg of normal  2 - Fully awake  1 - Needs oxygen to maintain oxygen saturation >90%  2 - Able to breathe deeply and cough freely

## 2023-02-20 NOTE — CONSULTS
Consult placed    Who:TREVON, [address] [place] [address]  Date:2/20/2023,  Time:12:46 PM        Electronically signed by James Saavedra on 2/20/2023 at 12:46 PM

## 2023-02-20 NOTE — OR NURSING
Time out completed prior to procedure. Pt was place prone on the CT table. Pt was placed on all cardiac monitoring.  Pt arrivedon 3 L NC

## 2023-02-20 NOTE — CONSULTS
Patient: Farrell Favre  [id number]  Date: 2/20/2023      Chief Complaint: shortness of breath    History of Present Illness/Hospital Course:  Farrell Favre is a 79year old female with a past medical history significant for asthma, HTN, HLD, hypothyroidism, GERD, and obesity who presented to [facility] on 2/13/23 with one month of progressive shortness of breath. She was found to have acute bilateral pulmonary emboli and left lower extremity DVT and was started on heparin gtt. Imaging also showed left perihilar abnormality suspected to be bronchogenic carcinoma. She was also positive for Covid with leukocytosis and elevated lactate. She was started on empiric antibiotics for possible post-obstructive pneumonia. CT AP revealed suspected metastatic lesions in her bones and liver. Oncology, Pulmonology, Cardiology, ID, IR, and Vascular Surgery were consulted. On 2/20 she underwent biopsy of her left iliac bone. She continues to have functional deficits below her baseline. Today she is seen with her  present. She reports continued shortness of breath. She remains on oxygen and denies being on oxygen at baseline. She reports that her strength is improving. She is hopeful to be able to discharge home.      has a past medical history of Asthma. has a past surgical history that includes CT BIOPSY DEEP BONE PERCUTANEOUS (2/20/2023). reports that she has never smoked. She has never used smokeless tobacco. She reports that she does not currently use alcohol. She reports that she does not use drugs. family history includes Cancer in her paternal grandfather and paternal grandmother. REVIEW OF SYSTEMS:   CONSTITUTIONAL: negative for fevers, chills, diaphoresis, activity change, appetite change, fatigue, night sweats and unexpected weight change.    EYES: negative for blurred vision, eye discharge, visual disturbance and icterus  HEENT: negative for hearing loss, tinnitus, ear drainage, sinus pressure, nasal congestion, epistaxis and snoring  RESPIRATORY: Negative for hemoptysis, cough, sputum production  CARDIOVASCULAR: negative for chest pain, palpitations, exertional chest pressure/discomfort, edema, syncope  GASTROINTESTINAL: negative for nausea, vomiting, diarrhea, constipation, blood in stool and abdominal pain  GENITOURINARY: negative for frequency, dysuria, urinary incontinence, decreased urine volume, and hematuria  HEMATOLOGIC/LYMPHATIC: negative for easy bruising, bleeding and lymphadenopathy  ALLERGIC/IMMUNOLOGIC: negative for recurrent infections, angioedema, anaphylaxis and drug reactions  ENDOCRINE: negative for weight changes and diabetic symptoms including polyuria, polydipsia and polyphagia  MUSCULOSKELETAL: negative for pain, joint swelling, decreased range of motion and muscle weakness  NEUROLOGICAL: negative for headaches, slurred speech, unilateral weakness  PSYCHIATRIC/BEHAVIORAL: negative for hallucinations, behavioral problems, confusion and agitation.      Physical Examination:  Vitals: Patient Vitals for the past 24 hrs:   BP Temp Temp src Pulse Resp SpO2 Height Weight   02/20/23 1449 134/85 97.7 °F (36.5 °C) Oral (!) 113 22 91 % -- --   02/20/23 1436 -- -- -- -- -- -- 5' 1\" (1.549 m) --   02/20/23 1113 134/85 98.3 °F (36.8 °C) Oral (!) 106 18 94 % -- --   02/20/23 0903 (!) 145/88 -- -- (!) 101 15 98 % -- --   02/20/23 0901 (!) 153/93 -- -- 99 17 98 % -- --   02/20/23 0900 (!) 153/93 -- -- (!) 102 13 97 % -- --   02/20/23 0857 (!) 150/90 -- -- 99 15 98 % -- --   02/20/23 0851 (!) 150/93 -- -- (!) 101 16 97 % -- --   02/20/23 0725 (!) 159/96 98.1 °F (36.7 °C) Oral 100 20 94 % -- --   02/20/23 0607 (!) 155/82 98.1 °F (36.7 °C) Oral -- -- 94 % -- --   02/20/23 0600 -- -- -- -- -- -- -- 190 lb 14.4 oz (86.6 kg)   02/20/23 0320 (!) 146/88 -- -- 99 -- 94 % -- --   02/19/23 2355 (!) 143/91 97.9 °F (36.6 °C) Oral 97 16 93 % -- --   02/19/23 2143 -- -- -- -- 16 -- -- --   02/19/23 2005 (!) 142/85 98 °F (36.7 °C) Oral (!) 104 18 94 % -- --   02/19/23 1745 (!) 146/94 97.9 °F (36.6 °C) Oral (!) 102 -- 96 % -- --     Mood: Stable  Const: No distress  ENT: Oral mucosa moist  Eyes: No discharge or injection  CV: Regular rate and rhythm, no murmur rub or gallop noted  Resp: Lungs clear to auscultation bilaterally, no rales wheezes or ronchi  GI: Soft, nontender, nondistended. Normal bowel sounds. Neuro: Alert, oriented, appropriate. No cranial nerve deficits appreciated. Motor examination reveals normal strength in all four limbs diffusely. Skin: No lesions or rashes noted. MSK: No joint abnormalities noted. Lab Results   Component Value Date    WBC 16.7 (H) 02/20/2023    HGB 11.6 (L) 02/20/2023    HCT 37.0 02/20/2023    MCV 85.9 02/20/2023     02/20/2023     Lab Results   Component Value Date    INR 0.99 02/20/2023    INR 1.16 (H) 02/15/2023    PROTIME 13.0 02/20/2023    PROTIME 14.8 (H) 02/15/2023     Lab Results   Component Value Date    CREATININE 0.7 02/20/2023    BUN 18 02/20/2023     (H) 02/20/2023    K 3.8 02/20/2023     02/20/2023    CO2 25 02/20/2023     Lab Results   Component Value Date    ALT 21 02/13/2023    AST 34 02/13/2023    ALKPHOS 351 (H) 02/13/2023    BILITOT 0.9 02/13/2023       Most recent echocardiogram 2/14/23  Left ventricular systolic function is normal with a 3D calculated EF of 56%. The left ventricle is normal in size with mild concentric hypertrophy. Flattened in systole (\"D-shaped septum\") consistent with right ventricular pressure overload. Normal left ventricular diastolic function. Right ventricular systolic function is reduced. Inadequate tricuspid valve regurgitation to estimate systolic pulmonary artery pressure. There is a trivial to small circumferential pericardial effusion noted. Most recent EKG revealed sinus tachycardia. IMAGING    XR chest 2/13/23  1. Prominence of the left superior mediastinal border.   It is unclear if   this represents a paramediastinal mass or lymphadenopathy. Recommend further   evaluation with a CT chest as there are no prior studies for comparison. 2.  Diffuse airspace infiltrates. These are indeterminate this could   represent pneumonia or edema. CT PE 2/13/23  1. Acute bilateral pulmonary embolism is confirmed. 2. No significant right ventricular strain. 3. 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 Pulmonary follow-up is recommended. 4. Mediastinal and bilateral hilar lymphadenopathy. 5. Multiple small pulmonary nodules bilaterally. Pulmonary metastasis cannot   be excluded. 6. Suspected malignant left pleural effusion. 7. Suspected hepatic metastasis. 8. Mosaic attenuation throughout the lungs likely in association with known   pulmonary emboli. 9. Small pericardial effusion noted incidentally. Findings were discussed with the emergency room clinician at 1:15 pm on   2/13/2023. CT AP W IV contrast 2/20/23  Widespread hepatic metastasis. Widespread bony osseous metastatic disease       Dominant cystic nodule in the pelvis. This is not clearly benign. Consider   pelvic ultrasound to rule out any complex internal features. Pleural-parenchymal disease at the lung bases. Please see recent chest CT   report     Assessment:  1. Left lung mass   2. PE/DVT  3. Covid   4. Acute hypoxic respiratory failure     Recommendations:  Patient with new functional deficits and ongoing medical complexity. Demonstrates ability to tolerate 3 hours therapy/day. She would be a good candidate for inpatient rehabilitation once medical ready. She prefers to discharge home if able. Thank you for this consult. Please contact me with any questions or concerns. [address] [facility]  Pierre Fritz MD 2/20/2023, 4:13 PM

## 2023-02-20 NOTE — BRIEF OP NOTE
Brief Postoperative Note    Patriica Cat  YOB: 1955  [id number]    Pre-operative Diagnosis:  bone lesions    Post-operative Diagnosis: Same    Procedure: CT Guided Biopsy of Lt iliac bone    Anesthesia: Local and Moderate Sedation    Surgeons/Assistants: Tristen Winter MD    Estimated Blood Loss: less than 5mL    Complications: None    Specimens: Was Obtained: Tissue from target.     Findings: Technically successful biopsy of boneTissue sent to pathology. 11 G core biopsy    Electronically signed by Tristen Winter MD on 2/20/2023 at 9:05 AM

## 2023-02-20 NOTE — PROGRESS NOTES
INPATIENT PULMONARY CRITICAL CARE PROGRESS NOTE      Reason for visit: Acute hypoxemic respiratory failure, COVID-19 infection with likely COVID-19 pneumonia, left lung mass, mediastinal and hilar adenopathy, bone metastasis left pleural effusion, extensive left leg DVT, PE.    SUBJECTIVE: The patient is feeling slightly better with regards to her respiratory status, looks better. She completed a bone biopsy by IR today. She remains afebrile and hemodynamically stable. She is in no respiratory distress. She is on oxygen at 3 LPM with SaO2 91%. Appetite is fair, she is having regular bowel movements. She denies other complaints.  at bedside       Physical Exam:  Blood pressure (!) 139/91, pulse (!) 102, temperature 97.8 °F (36.6 °C), temperature source Oral, resp. rate 20, height 5' 1\" (1.549 m), weight 190 lb 14.4 oz (86.6 kg), SpO2 94 %.'   Constitutional: Very pleasant, overweight, on supplemental oxygen but in no acute distress. HENT:  Oropharynx is clear and moist.  Class III airway, dental repair  Eyes:  Conjunctivae are normal. No scleral icterus. Glasses  Neck: Relatively short neck. No JVD  Cardiovascular: Sinus rhythm, normal heart sounds. No lower extremity edema. Pulmonary/Chest: No wheezes. No rales. No accessory muscle usage or stridor. Abdominal: Deferred. Musculoskeletal: No cyanosis. No clubbing. No obvious joint deformity. Lymphadenopathy: Deferred    Skin: Skin is warm and dry. No rash or nodules on the exposed extremities. Psychiatric: Normal mood and affect. Behavior is normal.  No anxiety. Neurologic: Alert, awake and oriented. PERRL. Speech fluent.   No obvious neurologic deficit, detailed exam not performed       Results:  CBC:   Recent Labs     02/20/23  0749   WBC 16.7*   HGB 11.6*   HCT 37.0   MCV 85.9        BMP:   Recent Labs     02/19/23  0629 02/20/23  0644    146*   K 3.7 3.8   * 107   CO2 24 25   PHOS 3.4 3.9   BUN 16 18   CREATININE 0.7 0.7     PT/INR:   Recent Labs     02/20/23  0644   PROTIME 13.0   INR 0.99         Imaging:  I have reviewed radiology images personally. CT BIOPSY DEEP BONE PERCUTANEOUS   Final Result   Successful CT guided core biopsy of the iliac bone. CT GUIDED NEEDLE PLACEMENT   Final Result   Successful CT guided core biopsy of the iliac bone. CT ABDOMEN PELVIS W IV CONTRAST Additional Contrast? None   Final Result   Widespread hepatic metastasis. Widespread bony osseous metastatic disease      Dominant cystic nodule in the pelvis. This is not clearly benign. Consider   pelvic ultrasound to rule out any complex internal features. Pleural-parenchymal disease at the lung bases. Please see recent chest CT   report         XR CHEST PORTABLE   Final Result   No significant interval change. Patchy bilateral interstitial and airspace   opacities. Left suprahilar opacity. CT CHEST PULMONARY EMBOLISM W CONTRAST   Final Result   1. Acute bilateral pulmonary embolism is confirmed. 2. No significant right ventricular strain. 3. 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 Pulmonary follow-up is recommended. 4. Mediastinal and bilateral hilar lymphadenopathy. 5. Multiple small pulmonary nodules bilaterally. Pulmonary metastasis cannot   be excluded. 6. Suspected malignant left pleural effusion. 7. Suspected hepatic metastasis. 8. Mosaic attenuation throughout the lungs likely in association with known   pulmonary emboli. 9. Small pericardial effusion noted incidentally. Findings were discussed with the emergency room clinician at 1:15 pm on   2/13/2023. VL Extremity Venous Left   Final Result      XR CHEST PORTABLE   Final Result   1. Prominence of the left superior mediastinal border. It is unclear if   this represents a paramediastinal mass or lymphadenopathy.   Recommend further   evaluation with a CT chest as there are no prior studies for comparison. 2.  Diffuse airspace infiltrates. These are indeterminate this could   represent pneumonia or edema. VL Extremity Venous Left    Result Date: 2/13/2023  Lower Extremities DVT Study  Demographics   Patient Name      Nikhil Barnes   Date of Study     02/13/2023          Gender              Female   Patient Number    [id number]          Date of Birth       1955   Visit Number      [id number]           Age                 79 year(s)   Accession Number  [id number]          Room Number         07   Corporate ID      [id number]           [address], [place],                                                            [address], [facility], [address]  Interpreting        [facility]  Physician                             Physician           Readers                                                            Tayo Nevarez MD  Procedure Type of Study:   Veins:Lower Extremities DVT Study, VL EXTREMITY VENOUS DUPLEX LEFT. Vascular Sonographer Report  Indications for Study:Swelling and Leg pain. Additional Indications:Patient scanned bedside in ED with complaints of left lower extremity swelling for 1 week with shortness of breath. Patient noted pain behind knee over the past two days. Recent travel where patient was sedentary for over 8 hours. Venous Duplex Scan: B-mode imaging of the deep and superficial veins, with compression maneuvers, including color and Doppler spectral waveform analysis. Impressions Right Impression There is no evidence of deep venous thrombosis involving the right common femoral vein. Left Impression Acute totally occluding deep vein thrombosis involving the left femoral vein at the distal thigh, popliteal vein, gastrocnemius veins (4 of 4), posterior tibial, peroneal and soleal veins.  No other evidence of deep vein or superficial vein thrombosis involving the left lower extremity. There is no previous exam for comparison. Conclusions   Summary   Extensive acute deep vein thrombosis involving the left femoral vein,  popliteal vein, gastrocnemius veins, posterior tibial, peroneal and soleal  veins. Signature   ------------------------------------------------------------------  Electronically signed by Kannan Ruiz MD (Interpreting  physician) on 02/13/2023 at 04:27 PM  ------------------------------------------------------------------  Patient   XR CHEST PORTABLE    Result Date: 2/13/2023  EXAMINATION: ONE XRAY VIEW OF THE CHEST 2/13/2023 10:50 am COMPARISON: None. HISTORY: ORDERING SYSTEM PROVIDED HISTORY: sob TECHNOLOGIST PROVIDED HISTORY: Reason for exam:->sob Reason for Exam: sob FINDINGS: Diffuse airspace infiltrates. Prominence of the left superior mediastinal border. No pneumothorax or pleural effusion     1. Prominence of the left superior mediastinal border. It is unclear if this represents a paramediastinal mass or lymphadenopathy. Recommend further evaluation with a CT chest as there are no prior studies for comparison. 2.  Diffuse airspace infiltrates. These are indeterminate this could represent pneumonia or edema. CT CHEST PULMONARY EMBOLISM W CONTRAST    Result Date: 2/13/2023  EXAMINATION: CTA OF THE CHEST 2/13/2023 12:19 pm TECHNIQUE: CTA of the chest was performed after the administration of intravenous contrast.  Multiplanar reformatted images are provided for review. MIP images are provided for review. Automated exposure control, iterative reconstruction, and/or weight based adjustment of the mA/kV was utilized to reduce the radiation dose to as low as reasonably achievable. COMPARISON: None.  HISTORY: ORDERING SYSTEM PROVIDED HISTORY: sob/tachy/abnl cxr TECHNOLOGIST PROVIDED HISTORY: Reason for exam:->sob/tachy/abnl cxr Decision Support Exception - unselect if not a suspected or confirmed emergency medical condition->Emergency Medical Condition (MA) Reason for Exam: chest pain in LT lower anterior chest, SOB,  dry cough , dizziness symptoms x 4-6 weeks but gerttting worse. pt did test (+) for COVID today   pt has had COVID one previous time with no lasting complications Relevant Medical/Surgical History: HTN,  GB removed FINDINGS: Pulmonary Arteries: The study is of good technical quality. Acute bilateral pulmonary embolism is confirmed. A filling defect is observed within the left upper lobar pulmonary artery. Filling defect also demonstrated within posterior segmental branches of the right lower lobar pulmonary artery. Poor characterization of the right upper lobar pulmonary artery due to dense beam hardening artifact within the SVC. However, there does appear to be a filling defect within an anterior segmental branch of the right upper lobar pulmonary artery as well. RV: LV ratio 1.2. Mediastinum: Mild cardiomegaly. Small pericardial effusion. Normal aorta. Thyroid and esophagus normal.  Extensive mediastinal and hilar lymphadenopathy. Index right paratracheal lymph node measures 13 mm. Lungs/pleura: The airways are patent. Small left pleural effusion. There is a mass in the left pulmonary hilum extending into the upper lobe. Best approximate measurements 3.5 x 3.5 cm. Of note, this mass appears to cross the major fissure involving the superior segment of the left lower lobe as well and insinuates within the left pulmonary hilum. Additional solid pulmonary nodules are identified in the bilateral lower lobes and right upper lobe. An index nodule in the right upper lobe measures 7 x 5 mm. Mosaic pattern of ground-glass opacification in the lungs diffusely. Interlobular septal thickening is also demonstrated. Upper Abdomen: Multiple low attenuating masses are demonstrated within the liver.   These cannot be characterized on this angiographic study of the chest.  The largest nodule in the right hepatic lobe measures 3.2 cm. Soft Tissues/Bones: No skeletal abnormalities throughout the chest.     1. Acute bilateral pulmonary embolism is confirmed. 2. No significant right ventricular strain. 3. Large mass centered in the left pulmonary hilum, suspected to represent bronchogenic carcinoma. Pulmonary follow-up is recommended. 4. Mediastinal and bilateral hilar lymphadenopathy. 5. Multiple small pulmonary nodules bilaterally. Pulmonary metastasis cannot be excluded. 6. Suspected malignant left pleural effusion. 7. Suspected hepatic metastasis. 8. Mosaic attenuation throughout the lungs likely in association with known pulmonary emboli. 9. Small pericardial effusion noted incidentally. Findings were discussed with the emergency room clinician at 1:15 pm on 2/13/2023. Assessment and plan:  Acute respiratory failure, hypoxemic. Probably related to pneumonia process in the lungs, potentially due to COVID-19 infection. Oxygen to keep SaO2 >90%. Weaned down to 3 LPM  Pulmonary embolism. On heparin, echocardiogram with septal flattening suggestive of RV pressure overload. On hold for biopsy today  DVT. Extensive clot in the left leg. On heparin. No obvious risk factors, could be hypercoagulable from likely underlying malignancy, less likely from COVID-19 infection  Lung mass. Proximal mass with adenopathy, could represent small cell lung cancer. The patient is aware that this is a suspected diagnosis, as is her . Hilar and mediastinal adenopathy. Probably can be imaged on PET/CT as an outpatient. Pleural effusion. Likely malignant, small. Not sufficient to yield on cytology to make a definitive diagnosis in many patients. Liver lesions. Likely metastatic. IR performed bone biopsy rather than liver biopsy today. Patient tolerated biopsy well  Pericardial effusion. Small, unclear etiology  COVID-19 infection, likely COVID 19 pneumonia.   Usually mild course after immunization, the older vaccines would not protect against the newer strains. On dexamethasone. Appreciate recommendations by Dr. Eugenia Morales  Leukocytosis. Antibiotics discontinued  Normochromic, normocytic anemia. Mild, follow H&H. Stable for now  Hypokalemia. Resolved  Elevated proBNP. Imaging does not suggest CHF  Elevated troponin. Likely nonspecific  DVT prophylaxis. On heparin      Discussed with patient, Dr. Andrew Rosas and nursing. Can probably be discharged home with supplemental oxygen. No new pulmonary recommendations. We will sign off, please call with questions and thank you for the interesting consult.       Electronically signed by:  Hilda Brunson MD    2/20/2023    11:11 PM.

## 2023-02-20 NOTE — PROGRESS NOTES
Infectious Disease Follow up Notes    CC :  COVID     Antibiotics:   None    Decadron 6mg po daily, day #7    Admit Date:   2/13/2023  Hospital Day: 8    Subjective:   She remains AF   Supplemental oxygen weaned to 3L NC  Sore after BM biopsy this morning     Objective:     Patient Vitals for the past 8 hrs:   BP Temp Temp src Pulse Resp SpO2 Weight   02/20/23 1113 134/85 98.3 °F (36.8 °C) Oral (!) 106 18 94 % --   02/20/23 0903 (!) 145/88 -- -- (!) 101 15 98 % --   02/20/23 0901 (!) 153/93 -- -- 99 17 98 % --   02/20/23 0900 (!) 153/93 -- -- (!) 102 13 97 % --   02/20/23 0857 (!) 150/90 -- -- 99 15 98 % --   02/20/23 0851 (!) 150/93 -- -- (!) 101 16 97 % --   02/20/23 0725 (!) 159/96 98.1 °F (36.7 °C) Oral 100 20 94 % --   02/20/23 0607 (!) 155/82 98.1 °F (36.7 °C) Oral -- -- 94 % --   02/20/23 0600 -- -- -- -- -- -- 190 lb 14.4 oz (86.6 kg)       EXAM:  Alert, oriented, conversant, NAD  Exposed skin without rash  NCAT, PERRL, sclera anicteric  Non-labored breathing        LINE:   PIV in place         Scheduled Meds:   melatonin  10 mg Oral Nightly    dexamethasone  6 mg Oral Daily    furosemide  20 mg IntraVENous Daily    potassium bicarb-citric acid  20 mEq Oral Daily    docusate sodium  100 mg Oral Daily    ascorbic acid  500 mg Oral Daily    zinc sulfate  50 mg Oral QPM    losartan  50 mg Oral BID    And    hydroCHLOROthiazide  12.5 mg Oral Daily    pantoprazole  40 mg Oral QAM AC    atorvastatin  40 mg Oral Nightly    thyroid  30 mg Oral Once per day on Mon Wed Fri    thyroid  60 mg Oral Once per day on Sun Tue Thu Sat    sodium chloride flush  5-40 mL IntraVENous 2 times per day       Continuous Infusions:   heparin (PORCINE) Infusion Stopped (02/20/23 0300)    sodium chloride Stopped (02/19/23 0730)    sodium chloride            Data Review:    Lab Results   Component Value Date    WBC 16.7 (H) 02/20/2023    HGB 11.6 (L) 02/20/2023    HCT 37.0 02/20/2023    MCV 85.9 02/20/2023     02/20/2023     Lab Results   Component Value Date    CREATININE 0.7 02/20/2023    BUN 18 02/20/2023     (H) 02/20/2023    K 3.8 02/20/2023     02/20/2023    CO2 25 02/20/2023       Hepatic Function Panel:   Lab Results   Component Value Date/Time    ALKPHOS 351 02/13/2023 11:12 AM    ALT 21 02/13/2023 11:12 AM    AST 34 02/13/2023 11:12 AM    PROT 7.2 02/13/2023 11:12 AM    BILITOT 0.9 02/13/2023 11:12 AM    LABALBU 3.5 02/20/2023 06:44 AM       Cultures:   2/13     Flu PCR neg               COVID RT PCR +              BC x2 neg         Radiology Review:  All pertinent images / reports were reviewed as a part of this visit. GODWIN 2/14/23   Summary   Left ventricular systolic function is normal with a 3D calculated EF of 56%. The left ventricle is normal in size with mild concentric hypertrophy. Flattened in systole (\"D-shaped septum\") consistent with right ventricular   pressure overload. Normal left ventricular diastolic function. Right ventricular systolic function is reduced. Inadequate tricuspid valve regurgitation to estimate systolic pulmonary   artery pressure. There is a trivial to small circumferential pericardial effusion noted. CTPA 2/13/23  Impression   1. Acute bilateral pulmonary embolism is confirmed. 2. No significant right ventricular strain. 3. 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 Pulmonary follow-up is recommended. 4. Mediastinal and bilateral hilar lymphadenopathy. 5. Multiple small pulmonary nodules bilaterally. Pulmonary metastasis cannot   be excluded. 6. Suspected malignant left pleural effusion. 7. Suspected hepatic metastasis. 8. Mosaic attenuation throughout the lungs likely in association with known   pulmonary emboli. 9. Small pericardial effusion noted incidentally.       LE doppler   Impressions   Right Impression   There is no evidence of deep venous thrombosis involving the right common   femoral vein. Left Impression   Acute totally occluding deep vein thrombosis involving the left femoral vein   at the distal thigh, popliteal vein, gastrocnemius veins (4 of 4), posterior   tibial, peroneal and soleal veins. No other evidence of deep vein or superficial vein thrombosis involving the   left lower extremity. There is no previous exam for comparison.       Assessment:     Patient Active Problem List    Diagnosis Date Noted    COVID 02/15/2023     Priority: Medium    Lung mass 02/15/2023     Priority: Medium    Mediastinal adenopathy 02/15/2023     Priority: Medium    Hilar adenopathy 02/15/2023     Priority: Medium    Metastasis to liver ([address] 02/15/2023     Priority: Medium    Acute deep vein thrombosis (DVT) of proximal vein of left lower extremity ([address] 02/14/2023     Priority: Medium    Bilateral pulmonary embolism ([address] 02/14/2023     Priority: Medium    Elevated troponin 02/14/2023     Priority: Medium    Deep venous thrombosis ([address] 02/14/2023     Priority: Medium    Pericardial effusion 02/14/2023     Priority: Medium    Pleural effusion 02/14/2023     Priority: Medium    Acute respiratory failure ([address] 02/13/2023     Priority: Medium       Generally healthy 67yoF with hsitory of asthma     Admitted 2/13/23 with 2 month history of progressive SOB, acute hypoxemic respiratory failure which appears to be multifactorial      COVID-19   Unable to pinpoint specific date of symptom onset, but at least 2/11/23  CXR with very typical findings of viral pneumonitis   She has been getting steroid since 2/14/23    She remains hypoxemic but is clinically improved and supplemental oxygen is being weaned  CRP was moderately elevated at 51 on 2/15/23 and was 5 on repeat 2/18/23  -continue decadron, to complete 10d course, today is day #7/10  -isolation x10d dating from 2/11/23 - through EOD 2/20/23     PE/DVT, pleural effusion, suspected bronchogenic lung cancer w liver met contributing the presenting illness and managed by Hospitalist, Denis Anguiano  S/p BM biopsy today 2/20/23      Little more to add, please call with further questions     Discussed with patient/family, all questions answered        Karolina Sparks MD  Phone: [phone]   Fax : [phone]

## 2023-02-20 NOTE — PRE SEDATION
Sedation Pre-Procedure Note    Patient Name: Renan Cruz   YOB: 1955  Room/Bed: 9511/5688-29  Medical Record Number: [id number]  Date: 2/20/2023   Time: 8:46 AM       Indication:  multiple bone and liver lesions, lung mass    Consent: I have discussed with the patient and/or the patient representative the indication, alternatives, and the possible risks and/or complications of the planned procedure and the anesthesia methods. The patient and/or patient representative appear to understand and agree to proceed. Vital Signs:   Vitals:    02/20/23 0725   BP: (!) 159/96   Pulse: 100   Resp: 20   Temp: 98.1 °F (36.7 °C)   SpO2: 94%       Past Medical History:   has a past medical history of Asthma. Past Surgical History:   has no past surgical history on file. Medications:   Scheduled Meds:    melatonin  10 mg Oral Nightly    dexamethasone  6 mg Oral Daily    furosemide  20 mg IntraVENous Daily    potassium bicarb-citric acid  20 mEq Oral Daily    docusate sodium  100 mg Oral Daily    ascorbic acid  500 mg Oral Daily    zinc sulfate  50 mg Oral QPM    losartan  50 mg Oral BID    And    hydroCHLOROthiazide  12.5 mg Oral Daily    pantoprazole  40 mg Oral QAM AC    atorvastatin  40 mg Oral Nightly    thyroid  30 mg Oral Once per day on Mon Wed Fri    thyroid  60 mg Oral Once per day on Sun Tue Thu Sat    sodium chloride flush  5-40 mL IntraVENous 2 times per day     Continuous Infusions:    heparin (PORCINE) Infusion Stopped (02/20/23 0300)    sodium chloride Stopped (02/19/23 0730)    sodium chloride       PRN Meds: traMADol, diphenhydrAMINE, heparin (porcine), heparin (porcine), sodium chloride flush, sodium chloride, ondansetron **OR** ondansetron, polyethylene glycol, acetaminophen **OR** acetaminophen, perflutren lipid microspheres, ipratropium-albuterol  Home Meds:   Prior to Admission medications    Medication Sig Start Date End Date Taking?  Authorizing Provider   Coenzyme Q10-Fish Oil-Vit E (CO-Q 10 OMEGA-3 FISH OIL PO) Take by mouth daily   Yes Historical Provider, MD   CALCIUM LACTATE PO Take 250 mg by mouth in the morning and at bedtime   Yes Historical Provider, MD   Bacillus Coagulans-Inulin (ALIGN PREBIOTIC-PROBIOTIC PO) Take by mouth daily   Yes Historical Provider, MD   TURMERIC PO Take 1,000 mg by mouth daily   Yes Historical Provider, MD   VITAMIN D PO Take 10,000 Int'l Units by mouth daily   Yes Historical Provider, MD   Biotin 1000 MCG TABS Take 10,000 mcg by mouth nightly   Yes Historical Provider, MD   magnesium lactate (MAG-TAB CR) 84 MG (7MEQ) TBCR extended release tablet Take by mouth nightly   Yes Historical Provider, MD   Multiple Vitamins-Minerals (ICAPS AREDS 2 PO) Take 1 tablet by mouth in the morning and at bedtime   Yes Historical Provider, MD   L-Lysine 1000 MG TABS Take by mouth daily   Yes Historical Provider, MD   Melatonin 5 MG CHEW Take 5 mg by mouth nightly   Yes Historical Provider, MD   atorvastatin (LIPITOR) 40 MG tablet atorvastatin 40 mg tablet    Historical Provider, MD   thyroid (ARMOUR) 30 MG tablet Take one tablet on Monday, Weednesday, and Friday 7/26/17   Historical Provider, MD   losartan-hydroCHLOROthiazide (HYZAAR) 100-12.5 MG per tablet Takes half in morning and half at night    Historical Provider, MD   omeprazole (PRILOSEC) 40 MG delayed release capsule omeprazole 40 mg capsule,delayed release    Historical Provider, MD   thyroid (ARMOUR) 60 MG tablet Take 60 mg by mouth daily 1 tablet on Sunday, Tuesday, Thursday and Saturday    Historical Provider, MD     Coumadin Use Last 7 Days:  no  Antiplatelet drug therapy use last 7 days: no  Other anticoagulant use last 7 days: yes - IV heparin  Additional Medication Information:        Pre-Sedation Documentation and Exam:   I have reviewed the patient's history and review of systems.     Mallampati Airway Assessment:  normal neck range of motion    Prior History of Anesthesia Complications:   none    ASA Classification:  Class 3 - A patient with severe systemic disease that limits activity but is not incapacitating    Sedation/ Anesthesia Plan:   intravenous sedation    Medications Planned:   midazolam (Versed) intravenously and fentanyl intravenously    Patient is an appropriate candidate for plan of sedation: yes    Electronically signed by Aquilse Beyer MD on 2/20/2023 at 8:46 AM

## 2023-02-20 NOTE — PROGRESS NOTES
Occupational Therapy  Facility/Department: [facility] - MED SURG  Treatment Note    Name: Stevenson Villanueva  : 1955  MRN: [id number]  Date of Service: 2023    Discharge Recommendations:  Continue to assess pending progress          Patient Diagnosis(es): The primary encounter diagnosis was Bilateral pulmonary embolism ([facility] [address] Diagnoses of Acute deep vein thrombosis (DVT) of proximal vein of left lower extremity ([facility] [address] Lung mass, Liver lesion, Pericardial effusion, Pleural effusion, Elevated troponin, Elevated brain natriuretic peptide (BNP) level, COVID, and Hypoxia were also pertinent to this visit. Past Medical History:  has a past medical history of Asthma. Past Surgical History:  has a past surgical history that includes CT BIOPSY DEEP BONE PERCUTANEOUS (2023). Treatment Diagnosis: impaired ADLs      Assessment   Performance deficits / Impairments: Decreased functional mobility ; Decreased strength;Decreased ADL status; Decreased cognition;Decreased balance;Decreased high-level IADLs;Decreased safe awareness;Decreased endurance  Assessment: Pt is a 79 is 78 yo female admitted to [facility] with a dx of ARF, COVID+, DVT, and PE. Pt reports her/spouse are from [place] and are visiting with her daughter/MAC. Pt states daughter lives in a 3 story home with bed/bath on 3rd level. Pt IND with I/ADLs and mobility without AD PTA. Currently pt is CGA for bed mobility and SPT, max A for LB dressing and toileting. Pt limited by decreased activity tolerance and dyspnea with exertion. Pt is presenting below baseline and would benefit from skilled OT services during acute hospital stay to address the above deficits. Will continue to assess discharge recs pending pt's progress during therapy (IPR vs home with 24H+HHOT).   REQUIRES OT FOLLOW-UP: Yes  Activity Tolerance  Activity Tolerance: Patient Tolerated treatment well  Activity Tolerance Comments: sitting in chair on 3 L O 2:  BP = 143/82, HR = 112, 93 % O 2 sats Plan   Occupational Therapy Plan  Times Per Week: 2-4x/ week  Current Treatment Recommendations: Strengthening, Balance training, Functional mobility training, Endurance training, Safety education & training, Equipment evaluation, education, & procurement, Patient/Caregiver education & training, Self-Care / ADL     Restrictions  Restrictions/Precautions  Restrictions/Precautions: General Precautions, Fall Risk, Isolation (CoVID +)  Position Activity Restriction  Other position/activity restrictions: IV, Purewick, 3 L O 2    Subjective   General  Chart Reviewed: Yes  Patient assessed for rehabilitation services?: Yes  Family / Caregiver Present: Yes (spouse)  Subjective  Subjective: denies pain  General Comment  Comments: RN cleared pt for OT; pt awake in bed, agreeable to get up to chair      Objective              Safety Devices  Type of Devices: Left in chair;Call light within reach;Nurse notified; Chair alarm in place           ADL  Feeding: Independent  Toileting: Dependent/Total (Purewick)        Bed mobility  Supine to Sit: Modified independent  Sit to Supine: Unable to assess (Left up in chair upon exiting, pt, family & RN agreeable)  Scooting: Modified independent  Transfers  Stand Pivot Transfers: Contact guard assistance  Sit to stand: Contact guard assistance  Stand to sit: Contact guard assistance    Vision - Basic Assessment  Prior Vision: Wears glasses all the time  Vision  Vision: Impaired  Vision Exceptions: Wears glasses at all times    Cognition  Overall Cognitive Status: WFL  Orientation  Overall Orientation Status: Within Functional Limits  Orientation Level: Oriented X4               Exercise Treatment: BUE & BLE seated in chair  Hand flex/ext: x   15 Reps  Wrist flex/ext:  X  15 Reps  Elbow flex/ext:  x  15  Reps LUE(d/t painful Right IV site)   Forearm sup/pron:  x   15 Reps  Shld flex/ext:  x  10  Reps             Education Given To: Patient  Education Provided: Role of Therapy;Plan of Care;Precautions  Education Provided Comments: disease specific: importance of OOB to chair for meals/cardiopulmonary benefits & pressure relief; use of RED /nurse call light for assist with ADL needs & transfers  Education Method: Verbal  Barriers to Learning: None  Education Outcome: Verbalized understanding      AM-PAC Score        AM-PAC Inpatient Daily Activity Raw Score: 14 (02/20/23 1630)  AM-PAC Inpatient ADL T-Scale Score : 33.39 (02/20/23 1630)  ADL Inpatient CMS 0-100% Score: 59.67 (02/20/23 1630)  ADL Inpatient CMS G-Code Modifier : CK (02/20/23 1630)    Tinneti Score       Goals  Short Term Goals  Time Frame for Short Term Goals: 1 week (2/24) unless noted  Short Term Goal 1: Pt will complete ambulatory transfers with SBA; 2/20 CGA for stand-pivot transfers  Short Term Goal 2: Pt will tolerate >3 mins stance at sink during ADLs with SBA  Short Term Goal 3: Pt will complete LB dressing with min A  Short Term Goal 4: Pt will complete x15 BUE exercises for strength and endurance; 2/20 tolerated 15 reps distal BUE & BLE AROM  Patient Goals   Patient goals : \"I want to be able to travel back to [place]\"       Therapy Time   Individual Concurrent Group Co-treatment   Time In [address]         Time Out 1610         Minutes [address]

## 2023-02-21 ENCOUNTER — APPOINTMENT (OUTPATIENT)
Dept: MRI IMAGING | Age: 68
DRG: 987 | End: 2023-02-21
Payer: COMMERCIAL

## 2023-02-21 LAB
ALBUMIN SERPL-MCNC: 3.5 G/DL (ref 3.4–5)
ANION GAP SERPL CALCULATED.3IONS-SCNC: 14 MMOL/L (ref 3–16)
ANTI-XA UNFRAC HEPARIN: 0.19 IU/ML (ref 0.3–0.7)
ANTI-XA UNFRAC HEPARIN: 0.63 IU/ML (ref 0.3–0.7)
ANTI-XA UNFRAC HEPARIN: 0.89 IU/ML (ref 0.3–0.7)
BUN BLDV-MCNC: 17 MG/DL (ref 7–20)
CALCIUM SERPL-MCNC: 9.6 MG/DL (ref 8.3–10.6)
CHLORIDE BLD-SCNC: 102 MMOL/L (ref 99–110)
CO2: 26 MMOL/L (ref 21–32)
CREAT SERPL-MCNC: 0.6 MG/DL (ref 0.6–1.2)
GFR SERPL CREATININE-BSD FRML MDRD: >60 ML/MIN/{1.73_M2}
GLUCOSE BLD-MCNC: 107 MG/DL (ref 70–99)
MAGNESIUM: 2.2 MG/DL (ref 1.8–2.4)
PHOSPHORUS: 3.7 MG/DL (ref 2.5–4.9)
POTASSIUM SERPL-SCNC: 4 MMOL/L (ref 3.5–5.1)
SODIUM BLD-SCNC: 142 MMOL/L (ref 136–145)

## 2023-02-21 PROCEDURE — 85520 HEPARIN ASSAY: CPT

## 2023-02-21 PROCEDURE — 97110 THERAPEUTIC EXERCISES: CPT

## 2023-02-21 PROCEDURE — A9579 GAD-BASE MR CONTRAST NOS,1ML: HCPCS

## 2023-02-21 PROCEDURE — 2580000003 HC RX 258: Performed by: INTERNAL MEDICINE

## 2023-02-21 PROCEDURE — 94761 N-INVAS EAR/PLS OXIMETRY MLT: CPT

## 2023-02-21 PROCEDURE — 6360000002 HC RX W HCPCS: Performed by: INTERNAL MEDICINE

## 2023-02-21 PROCEDURE — 6370000000 HC RX 637 (ALT 250 FOR IP): Performed by: INTERNAL MEDICINE

## 2023-02-21 PROCEDURE — 2500000003 HC RX 250 WO HCPCS: Performed by: NURSE PRACTITIONER

## 2023-02-21 PROCEDURE — 6360000004 HC RX CONTRAST MEDICATION

## 2023-02-21 PROCEDURE — 83735 ASSAY OF MAGNESIUM: CPT

## 2023-02-21 PROCEDURE — 2700000000 HC OXYGEN THERAPY PER DAY

## 2023-02-21 PROCEDURE — 97530 THERAPEUTIC ACTIVITIES: CPT

## 2023-02-21 PROCEDURE — 1200000000 HC SEMI PRIVATE

## 2023-02-21 PROCEDURE — 6370000000 HC RX 637 (ALT 250 FOR IP): Performed by: NURSE PRACTITIONER

## 2023-02-21 PROCEDURE — 36415 COLL VENOUS BLD VENIPUNCTURE: CPT

## 2023-02-21 PROCEDURE — 97116 GAIT TRAINING THERAPY: CPT

## 2023-02-21 PROCEDURE — 80069 RENAL FUNCTION PANEL: CPT

## 2023-02-21 PROCEDURE — 70553 MRI BRAIN STEM W/O & W/DYE: CPT

## 2023-02-21 RX ADMIN — Medication 50 MG: at 18:51

## 2023-02-21 RX ADMIN — FUROSEMIDE 20 MG: 10 INJECTION, SOLUTION INTRAMUSCULAR; INTRAVENOUS at 08:12

## 2023-02-21 RX ADMIN — ATORVASTATIN CALCIUM 40 MG: 40 TABLET, FILM COATED ORAL at 21:31

## 2023-02-21 RX ADMIN — HEPARIN SODIUM 1510 UNITS/HR: 10000 INJECTION, SOLUTION INTRAVENOUS at 16:07

## 2023-02-21 RX ADMIN — Medication 10 MG: at 21:31

## 2023-02-21 RX ADMIN — HYDROCHLOROTHIAZIDE 12.5 MG: 12.5 CAPSULE ORAL at 08:14

## 2023-02-21 RX ADMIN — DEXAMETHASONE 6 MG: 4 TABLET ORAL at 08:14

## 2023-02-21 RX ADMIN — SODIUM CHLORIDE, PRESERVATIVE FREE 10 ML: 5 INJECTION INTRAVENOUS at 08:13

## 2023-02-21 RX ADMIN — OXYCODONE HYDROCHLORIDE AND ACETAMINOPHEN 500 MG: 500 TABLET ORAL at 08:14

## 2023-02-21 RX ADMIN — LOSARTAN POTASSIUM 50 MG: 25 TABLET, FILM COATED ORAL at 08:14

## 2023-02-21 RX ADMIN — DOCUSATE SODIUM 100 MG: 100 CAPSULE, LIQUID FILLED ORAL at 08:14

## 2023-02-21 RX ADMIN — THYROID, PORCINE 60 MG: 30 TABLET ORAL at 07:24

## 2023-02-21 RX ADMIN — LOSARTAN POTASSIUM 50 MG: 25 TABLET, FILM COATED ORAL at 21:31

## 2023-02-21 RX ADMIN — GADOTERIDOL 17 ML: 279.3 INJECTION, SOLUTION INTRAVENOUS at 18:35

## 2023-02-21 RX ADMIN — PANTOPRAZOLE SODIUM 40 MG: 40 TABLET, DELAYED RELEASE ORAL at 07:24

## 2023-02-21 RX ADMIN — ONDANSETRON 4 MG: 2 INJECTION INTRAMUSCULAR; INTRAVENOUS at 09:18

## 2023-02-21 RX ADMIN — POTASSIUM BICARBONATE 20 MEQ: 782 TABLET, EFFERVESCENT ORAL at 08:13

## 2023-02-21 ASSESSMENT — PAIN SCALES - GENERAL
PAINLEVEL_OUTOF10: 0

## 2023-02-21 NOTE — PROGRESS NOTES
IV heparin site began leaking called to the bedside to find puddle of blood on floor and in bed with pt, while cleaning pt up pt began to feel light headed attempted to lay pt back in bed when she projectile vomited and was hypotensive. Staff at bedside to help RN. Family at bedside. NP paged.

## 2023-02-21 NOTE — PROGRESS NOTES
ONCOLOGY HEMATOLOGY CARE PROGRESS NOTE      SUBJECTIVE:    Loraine Campbell stable today. Has increased o2 requirements currently as she experienced likely vaso vagal response to IV site bleeding. Experienced nausea/dizziness and hypotension with sight of blood. This was controlled and patient feeling better now. Prior to this episode, was stable on 3L o2 via NC. Breathing feels stable per patient. Has adequate PO intake. ROS:     Constitutional: fatigue. No weight loss, No fever, No chills, No night sweats.     Eyes:  No impairment or change in vision  ENT / Mouth:  No pain, abnormal ulceration, bleeding, nasal drip or change in voice or hearing  Cardiovascular:  No chest pain, palpitations, new edema, or calf discomfort  Respiratory:  No pain, hemoptysis, change to breathing  Breast:  No pain, discharge, change in appearance or texture  Gastrointestinal:  No pain, cramping, jaundice, change to eating and bowel habits  Urinary:  No pain, bleeding or change in continence  Genitalia: No pain, bleeding or discharge  Musculoskeletal:  No redness, pain, edema or weakness  Skin:  No pruritus, rash, change to nodules or lesions  Neurologic:  No discomfort, change in mental status, speech, sensory or motor activity  Psychiatric:  No change in concentration or change to affect or mood  Endocrine:  No hot flashes, increased thirst, or change to urine production  Hematologic: No petechiae, ecchymosis or bleeding  Lymphatic:  No lymphadenopathy or lymphedema  Allergy / Immunologic:  No eczema, hives, frequent or recurrent infections    OBJECTIVE        Physical    VITALS:  Patient Vitals for the past 24 hrs:   BP Temp Temp src Pulse Resp SpO2 Height   02/21/23 0415 (!) 142/91 98.2 °F (36.8 °C) Oral (!) 103 20 92 % --   02/20/23 2215 (!) 139/91 97.8 °F (36.6 °C) Oral (!) 102 20 94 % --   02/20/23 2015 133/80 98.2 °F (36.8 °C) Oral (!) 108 15 -- --   02/20/23 1449 134/85 97.7 °F (36.5 °C) Oral (!) 113 22 91 % --   02/20/23 1436 -- -- -- -- -- -- 5' 1\" (1.549 m)   02/20/23 1113 134/85 98.3 °F (36.8 °C) Oral (!) 106 18 94 % --   02/20/23 0903 (!) 145/88 -- -- (!) 101 15 98 % --   02/20/23 0901 (!) 153/93 -- -- 99 17 98 % --   02/20/23 0900 (!) 153/93 -- -- (!) 102 13 97 % --   02/20/23 0857 (!) 150/90 -- -- 99 15 98 % --   02/20/23 0851 (!) 150/93 -- -- (!) 101 16 97 % --       24HR INTAKE/OUTPUT:    Intake/Output Summary (Last 24 hours) at 2/21/2023 0745  Last data filed at 2/21/2023 0415  Gross per 24 hour   Intake 1840 ml   Output 6500 ml   Net -4660 ml       CONSTITUTIONAL: awake, alert, cooperative, no apparent distress   EYES: pupils equal, round and reactive to light, sclera clear and conjunctiva normal  ENT: Normocephalic, without obvious abnormality, atraumatic  NECK: supple, symmetrical, no jugular venous distension and no carotid bruits   HEMATOLOGIC/LYMPHATIC: no cervical, supraclavicular or axillary lymphadenopathy   LUNGS: no increased work of breathing and no wheezing/rhonchi/rales. No accessory muscle use. CARDIOVASCULAR: regular rate and rhythm, normal S1 and S2, no murmur noted  ABDOMEN: normal bowel sounds x 4, soft, non-distended, non-tender, no masses palpated, no hepatosplenomegaly   MUSCULOSKELETAL: full range of motion noted, tone is normal  NEUROLOGIC: awake, alert, oriented to name, place and time. Motor skills grossly intact. SKIN: Normal skin color, texture, turgor and no jaundice.  appears intact   EXTREMITIES: no LE edema     DATA:  CBC:    Recent Labs     02/20/23  0749 02/17/23  0638 02/16/23  0637 02/15/23  0601 02/14/23  0357   WBC 16.7* 13.0* 14.2* 14.1* 17.3*   NEUTROABS  --  9.9* 10.9* 11.2* 13.6*   LYMPHOPCT  --  18.0 19.0 12.7 12.9   RBC 4.30 4.00 3.66* 3.66* 4.05   HGB 11.6* 11.0* 10.3* 10.3* 11.2*   HCT 37.0 34.3* 31.3* 31.4* 34.1*   MCV 85.9 85.9 85.4 85.7 84.2   MCH 27.1 27.4 28.1 28.1 27.6   MCHC 31.5 32.0 32.9 32.7 32.7   RDW 17.6* 17.1* 17.2* 17.4* 17.7*  228 186 157 150       PT/INR:    Recent Labs     02/20/23  0644 02/15/23  0601 02/13/23  1112   PROT  --   --  7.2   INR 0.99 1.16*  --      PTT:    Recent Labs     02/13/23  1305   APTT 26.5       CMP:    Recent Labs     02/21/23  0448 02/20/23  0644 02/19/23  0629 02/17/23  0638 02/16/23  0636 02/14/23  0357 02/13/23  1112    146* 145 145 145   < > 141   K 4.0 3.8 3.7 3.6 3.5   < > 3.3*    107 111* 112* 112*   < > 101   CO2 26 25 24 22 22   < > 24   GLUCOSE 107* 95 90 94 96   < > 131*   BUN 17 18 16 18 17   < > 22*   CREATININE 0.6 0.7 0.7 0.7 0.7   < > 0.8   LABGLOM >60 >60 >60 >60 >60   < > >60   CALCIUM 9.6 9.7 9.4 9.3 9.1   < > 10.4   PROT  --   --   --   --   --   --  7.2   LABALBU 3.5 3.5 2.9*  --   --   --  4.0   AGRATIO  --   --   --   --   --   --  1.3   BILITOT  --   --   --   --   --   --  0.9   ALKPHOS  --   --   --   --   --   --  351*   ALT  --   --   --   --   --   --  21   AST  --   --   --   --   --   --  34   MG 2.20 2.20 2.10  --  2.10   < >  --     < > = values in this interval not displayed. Lab Results   Component Value Date    CALCIUM 9.6 02/21/2023    PHOS 3.7 02/21/2023       LDH:  Recent Labs     02/15/23  0601   *       Radiology Review:  CT GUIDED NEEDLE PLACEMENT  Narrative: PROCEDURE:  CT GUIDED CORE NEEDLE BONE BIOPSY OF THE LEFT ILIAC BONE. MODERATE CONSCIOUS SEDATION    2/20/2023    HISTORY:  ORDERING SYSTEM PROVIDED HISTORY: Likely mets to lung, spoke to Dr. Alok Martins, he  has reviewed images. Will stop heaprin for 12 hours  TECHNOLOGIST PROVIDED HISTORY:  Reason for exam:->Likely mets to lung, spoke to Dr. Alok Martins, he has reviewed  images. Will stop heaprin for 12 hours  Reason for Exam: metastatic disease    SEDATION:  Moderate sedation was ordered and supervised by the attending with physician  face-to-face monitoring. Medications were provided and recorded by Radiology  nurses.   Moderate conscious sedation was administered for 7 minutes    TECHNIQUE:  Informed consent was obtained following a detailed explanation of the  procedure including risks, benefits, and alternatives. Universal protocol  was followed. The patient was placed on the CT table in a prone position. Sterile gowns, masks, hats and gloves utilized for maximal sterile barrier. Axial images were obtained through the iliac bones using CT guidance and a  suitable skin site was prepped and draped in sterile fashion. Local  anesthesia was achieved with lidocaine. An 11 gauge [employer] bone marrow  biopsy needle was advanced into the left and a core biopsy specimen was  obtained and the patient tolerated the procedure well. EBL: Minimal, less  than 5 cc    Dose modulation, iterative reconstruction, and/or weight based adjustment of  the mA/kV was utilized to reduce the radiation dose to as low as reasonably  achievable. Automated exposure control, iterative reconstruction, and/or weight based  adjustment of the mA/kV was utilized to reduce the radiation dose to as low  as reasonably achievable. Impression: Successful CT guided core biopsy of the iliac bone. CT BIOPSY DEEP BONE PERCUTANEOUS  Narrative: PROCEDURE:  CT GUIDED CORE NEEDLE BONE BIOPSY OF THE LEFT ILIAC BONE. MODERATE CONSCIOUS SEDATION    2/20/2023    HISTORY:  ORDERING SYSTEM PROVIDED HISTORY: Likely mets to lung, spoke to Dr. Gaurang Watts, he  has reviewed images. Will stop heaprin for 12 hours  TECHNOLOGIST PROVIDED HISTORY:  Reason for exam:->Likely mets to lung, spoke to Dr. Gaurang Watts, he has reviewed  images. Will stop heaprin for 12 hours  Reason for Exam: metastatic disease    SEDATION:  Moderate sedation was ordered and supervised by the attending with physician  face-to-face monitoring. Medications were provided and recorded by Radiology  nurses.   Moderate conscious sedation was administered for 7 minutes    TECHNIQUE:  Informed consent was obtained following a detailed explanation of the  procedure including risks, benefits, and alternatives. Universal protocol  was followed. The patient was placed on the CT table in a prone position. Sterile gowns, masks, hats and gloves utilized for maximal sterile barrier. Axial images were obtained through the iliac bones using CT guidance and a  suitable skin site was prepped and draped in sterile fashion. Local  anesthesia was achieved with lidocaine. An 11 gauge [employer] bone marrow  biopsy needle was advanced into the left and a core biopsy specimen was  obtained and the patient tolerated the procedure well. EBL: Minimal, less  than 5 cc    Dose modulation, iterative reconstruction, and/or weight based adjustment of  the mA/kV was utilized to reduce the radiation dose to as low as reasonably  achievable. Automated exposure control, iterative reconstruction, and/or weight based  adjustment of the mA/kV was utilized to reduce the radiation dose to as low  as reasonably achievable. Impression: Successful CT guided core biopsy of the iliac bone. CT ABDOMEN PELVIS W IV CONTRAST Additional Contrast? None  Narrative: EXAMINATION:  CT OF THE ABDOMEN AND PELVIS WITH CONTRAST 2/20/2023 8:36 am    TECHNIQUE:  CT of the abdomen and pelvis was performed with the administration of  intravenous contrast. Multiplanar reformatted images are provided for review. Automated exposure control, iterative reconstruction, and/or weight based  adjustment of the mA/kV was utilized to reduce the radiation dose to as low  as reasonably achievable. COMPARISON:  None. HISTORY:  ORDERING SYSTEM PROVIDED HISTORY: evaluate for liver biopsy  TECHNOLOGIST PROVIDED HISTORY:  Reason for exam:->evaluate for liver biopsy  Additional Contrast?->None  Reason for Exam: poss metastatic ca, eval liver    FINDINGS:  Lower Chest: Moderate left-sided pleural effusion is seen.   Patchy  ground-glass opacity and parenchymal nodularity seen in left lung base    Patchy parenchymal nodularity seen in the right lung base. Septal thickening  seen right lung base    Small hiatal hernia seen. There is nonspecific thickening at the GE junction. Organs: No splenomegaly. Right adrenal gland is normal.  Left adrenal gland is normal.    No hydronephrosis on the right or left    No pancreatic calcification. No peripancreatic fluid    There are clips from prior cholecystectomy    There is subtle lobular contour throughout the liver. There is trace  intrahepatic biliary ductal dilatation    Scattered ill-defined hypodense nodules are seen throughout the liver,  involving both the right and left hepatic lobes. There is a dominant  peripheral nodule in the right lobe of the liver, measuring 3.1 cm x 2.3 cm  as seen on image number 47    GI/Bowel: No significant small bowel distention noted. Mild stool load seen  in colon. Scattered colonic diverticula are seen. No bowel obstruction  noted. Pelvis: There is a dominant cystic nodule seen in the pelvis, measuring 6.6  cm x 6.5 cm. No free fluid in pelvis. No pelvic adenopathy. Calcifications  are seen in the pelvis, compatible with phleboliths. Peritoneum/Retroperitoneum: No retroperitoneal adenopathy. Atherosclerotic  change seen in aorta. Atherosclerotic change seen in iliacs. Bones/Soft Tissues: Widespread sclerotic lesions are seen throughout the  pelvic bones, and vertebral bodies. Impression: Widespread hepatic metastasis. Widespread bony osseous metastatic disease    Dominant cystic nodule in the pelvis. This is not clearly benign. Consider  pelvic ultrasound to rule out any complex internal features. Pleural-parenchymal disease at the lung bases.   Please see recent chest CT  report      Problem List  Patient Active Problem List   Diagnosis    Acute respiratory failure (HCC)    Acute deep vein thrombosis (DVT) of proximal vein of left lower extremity (HCC)    Bilateral pulmonary embolism (HCC)    Elevated troponin    Deep venous thrombosis (HCC)    Pericardial effusion    Pleural effusion on left    Pneumonia due to COVID-19 virus    Mass of left lung    Mediastinal adenopathy    Hilar adenopathy    Metastasis to liver [facility])       ASSESSMENT AND PLAN:       1.) Left lung mass  - concern for small cell lung cancer   - CTPA, 2/13/2023, showed a 3.5 x 3.5 cm left hilar mass, left pleural effusion, multiple liver mets. Bone mets seemed likely. - had CT guided biopsy of bone lesion this morning. Radiology felt this was safer compared to liver biopsy.  Results still pending.   - PET/CT as an outpatient.   - MRI brain ordered and pending today  - Caris tumor profiling will be requested after the biopsy has been obtained outpatient  - will need outpatient follow up scheduled at discharge      2.) PE  - hypercoagulable provocation from likely underlying malignancy   - CTPA, 2/13/2023, showed bilateral PEs.  - On UFH.      3.) SARS-CoV-2  - SARS-CoV-2 PCR positive, 2/13/2023.  - 10 days isolation dating back to 02/11/2023  - CTPA, 2/13/2023, showed bilateral diffuse patchy GGO.  - She is on 3 LPM by NC this is improving daily.   - Started Dex, 2/14/2023.  - completed isolation 02/20  - received IV rocephin/azithro     4.) Essential hypertension     5.) Hyperlipidemia     6.) Hypothyroidism          ONCOLOGIC DISPOSITION:      FADIA BARLOW  Please contact through [employer]

## 2023-02-21 NOTE — CARE COORDINATION
Chart reviewed. Acute respiratory failure, COVID, bilateral PE, DVT, Left lung mass, liver mets, bone mets. Management per cardiology, pulmonology, HEMONC, ID and IM. IV ABX, IV steroids. s/p biopsy on Monday. IR opted to do bone biopsy, rather than liver biopsy. Plan for MRI brain today. Pt from home with . Lives in [place] and recently went [facility] Pt and  have being staying here with daughter and son-in-law. IPTA. PTOT rec IPR vs home w/HHC. ARU consult placed. ARU following. Currently on 3 liters oxygen, none at baseline. Denies dcp needs. CM will follow for O2 needs.  Milagros

## 2023-02-21 NOTE — PROGRESS NOTES
Physical Therapy  Facility/Department: [facility] B3 - MED SURG  Daily Treatment Note  NAME: Farrell Favre  : 1955  MRN: [id number]    Date of Service: 2023    Discharge Recommendations:  Continue to assess pending progress, 24 hour supervision or assist, Home with Home health PT (vs IPR)   PT Equipment Recommendations  Equipment Needed: No (CTA: RW)  [facility] 6 Clicks Inpatient Mobility:  AM-PAC Basic Mobility - Inpatient   How much help is needed turning from your back to your side while in a flat bed without using bedrails?: A Little  How much help is needed moving from lying on your back to sitting on the side of a flat bed without using bedrails?: A Little  How much help is needed moving to and from a bed to a chair?: A Little  How much help is needed standing up from a chair using your arms?: A Little  How much help is needed walking in hospital room?: A Little  How much help is needed climbing 3-5 steps with a railing?: A Lot  AM-PAC Inpatient Mobility Raw Score : 17  AM-PAC Inpatient T-Scale Score : 42.13  Mobility Inpatient CMS 0-100% Score: 50.57  Mobility Inpatient CMS G-Code Modifier : CK    Patient Diagnosis(es): The primary encounter diagnosis was Bilateral pulmonary embolism ([address] Diagnoses of Acute deep vein thrombosis (DVT) of proximal vein of left lower extremity ([address] Lung mass, Liver lesion, Pericardial effusion, Pleural effusion, Elevated troponin, Elevated brain natriuretic peptide (BNP) level, COVID, and Hypoxia were also pertinent to this visit. Assessment   Assessment: pt currently functioning below baseline, requires SBA for transfers, cga for gait up to 25 ft RW prior to fatigue. pt fatigues quickly. Will continue to assess discharge recs pending pt's progress during therapy (IPR vs home with 24H+HHPT).   Activity Tolerance: Patient tolerated treatment well  Equipment Needed: No (CTA: RW)     Plan    Physcial Therapy Plan  General Plan: 3-5 times per week  Current Treatment Recommendations: Strengthening;Balance training; Wheelchair mobility training;Gait training;Home exercise program;Safety education & training; Therapeutic activities; Functional mobility training;Stair training;Neuromuscular re-education;Patient/Caregiver education & training;Transfer training;ADL/Self-care training;IADL training; Endurance training;Return to work related activity; Positioning;Equipment evaluation, education, & procurement; Modalities; Pain management;Cognitive/Perceptual training     Restrictions  Restrictions/Precautions  Restrictions/Precautions: General Precautions, Fall Risk, Isolation (CoVID +)  Position Activity Restriction  Other position/activity restrictions: IV, Purewick, 3 L O 2     Subjective    Subjective  Subjective: Pt up in chair and agrees to PT session  Pain: Pt denies pain at rest  Orientation  Overall Orientation Status: Within Normal Limits     Objective   Vitals  Heart Rate: (!) 104  BP: 116/81  BP Location: Left upper arm  MAP (Calculated): 93  SpO2: 92 %  O2 Device: Nasal cannula  Bed Mobility Training  Bed Mobility Training: No (pt in chair start and EOS)  Balance  Sitting: Intact  Standing: With support  Transfer Training  Transfer Training:  (rw)  Overall Level of Assistance: Stand-by assistance  Interventions: Safety awareness training;Verbal cues  Sit to Stand: Contact-guard assistance  Stand to Sit: Contact-guard assistance  Gait Training  Gait Training:  (rw and CGA- pt w/ multiple lines requiring cga in walking)  Gait  Overall Level of Assistance: Contact-guard assistance  Interventions: Verbal cues; Safety awareness training  Step Length: Right shortened;Left shortened  Gait Abnormalities: Decreased step clearance  Distance (ft): 25 Feet (2x, limited by multiple lines for walking in the room.)  Assistive Device: Walker, rolling     PT Exercises  Exercise Treatment: performed BLE TE 15X EACH: AP, SAQ, QS, GS, SLR, HIP ABD, LAQ, MARCHES     Safety Devices  Type of Devices: Left in chair;Call light within reach;Nurse notified; Chair alarm in place       Goals  Short Term Goals  Time Frame for Short Term Goals: 10 days 2/28  Short Term Goal 1: pt will complete bed mobility with mod ind  -2/21 NT  Short Term Goal 2: pt will complete functional transfer with mod ind   -2/21 sba  Short Term Goal 3: pt will ambulate 50 ft with LRAD and supv   -2/21  cga rw 25'  Short Term Goal 4: pt will tolerate 3 steps with  1 HR and supv   -2/21 NT  Short Term Goal 5: pt will tolerate 10-15 reps of BLE seated/supine ther ex by 2/25 to maintain functional strength   -2/21 progressing  Patient Goals   Patient Goals : Erla Belem better\"    Education  Patient Education  Education Given To: Patient  Education Provided: Role of Therapy;Plan of Care  Education Method: Verbal  Barriers to Learning: None  Education Outcome: Verbalized understanding    Therapy Time   Individual Concurrent Group Co-treatment   Time In 1032         Time Out 1110         Minutes 38         Timed Code Treatment Minutes: [address]

## 2023-02-21 NOTE — PROGRESS NOTES
Occupational Therapy  Facility/Department: [facility] B3 - MED SURG  Treatment Note    Name: Suhail Farmer  : 1955  MRN: [id number]  Date of Service: 2023    Discharge Recommendations:  24 hour supervision or assist          Patient Diagnosis(es): The primary encounter diagnosis was Bilateral pulmonary embolism ([address] Diagnoses of Acute deep vein thrombosis (DVT) of proximal vein of left lower extremity ([address] Lung mass, Liver lesion, Pericardial effusion, Pleural effusion, Elevated troponin, Elevated brain natriuretic peptide (BNP) level, COVID, and Hypoxia were also pertinent to this visit. Past Medical History:  has a past medical history of Asthma. Past Surgical History:  has a past surgical history that includes CT BIOPSY DEEP BONE PERCUTANEOUS (2023). Treatment Diagnosis: impaired ADLs      Assessment   Performance deficits / Impairments: Decreased ADL status; Decreased high-level IADLs  Assessment: Pt is a [age] yo female admitted to [facility] with a dx of ARF, COVID+, DVT, and PE. Pt reports her/spouse are from [place] and are visiting with her daughter/MAC. Pt states daughter lives in a 3 story home with bed/bath on 3rd level. Pt IND with I/ADLs and mobility without AD PTA. Currently pt is SBA with functional transfers & standing ADL's; set up seated for LE dressing remains dependent for  toileting/use of \"purewick\". Pt limited by decreased activity tolerance and dyspnea with exertion. Pt is presenting below baseline and would benefit from skilled OT services during acute hospital stay to address the above deficits.   REQUIRES OT FOLLOW-UP: Yes  Activity Tolerance  Activity Tolerance: Patient Tolerated treatment well        Plan   Occupational Therapy Plan  Times Per Week: 2-3x/ week  Current Treatment Recommendations: Strengthening, Balance training, Functional mobility training, Endurance training, Safety education & training, Equipment evaluation, education, & procurement, Patient/Caregiver education & training, Self-Care / ADL     Restrictions  Restrictions/Precautions  Restrictions/Precautions: General Precautions, Fall Risk  Position Activity Restriction  Other position/activity restrictions: IV, Purewick, 4 L O 2    Subjective   General  Chart Reviewed: Yes  Patient assessed for rehabilitation services?: Yes  Family / Caregiver Present: Yes (spouse)  Subjective  Subjective: denies pain  General Comment  Comments: RN cleared pt for OT; pt sitting up in chair, agreeable to therapy            Objective   Heart Rate: (!) 110  Heart Rate Source: Monitor  BP: 109/65  BP Location: Right upper arm  BP Method: Automatic  Patient Position: Sitting  MAP (Calculated): 80  Resp: 20  SpO2: 93 %  O2 Device: Nasal cannula             Safety Devices  Type of Devices: Left in chair;Call light within reach;Nurse notified; Chair alarm in place  Bed Mobility Training  Bed Mobility Training: No (pt in chair start and EOS)  Balance  Sitting: Intact  Standing: With support  Transfer Training  Transfer Training:  (rw)  Overall Level of Assistance: Stand-by assistance  Interventions: Safety awareness training;Verbal cues  Sit to Stand: Contact-guard assistance  Stand to Sit: Contact-guard assistance  Gait Training  Gait Training:  (rw and CGA- pt w/ multiple lines requiring cga in walking)  Gait  Overall Level of Assistance: Contact-guard assistance  Interventions: Verbal cues; Safety awareness training  Step Length: Right shortened;Left shortened  Gait Abnormalities: Decreased step clearance  Distance (ft): 25 Feet (2x, limited by multiple lines for walking in the room.)  Assistive Device: Walker, rolling        ADL  Feeding: Independent  Grooming: Stand by assistance (standing at sink to brush teeth, wash face & hands)  LE Dressing: Independent (seated to manage socks)  Toileting: Dependent/Total (purewick (\"water pills\"))     Activity Tolerance  Activity Tolerance: Patient tolerated treatment well  Bed mobility  Supine to Sit: Unable to assess (already up in chair)  Sit to Supine: Unable to assess (left up in chair, pt agreeable)  Transfers  Sit to stand: Stand by assistance  Stand to sit: Stand by assistance  Transfer Comments: SBA walking to/from bathroom without AD     Cognition  Overall Cognitive Status: WFL  Orientation  Overall Orientation Status: Within Normal Limits  Orientation Level: Oriented X4               Exercise Treatment: BUE seated in chair except Left elbow  Education Given To: Patient  Education Provided: Role of Therapy;Plan of Care;Precautions  Education Provided Comments: disease specific: importance of OOB to chair for meals/cardiopulmonary benefits & pressure relief; use of RED /nurse call light for assist with ADL needs & transfers  Education Method: Verbal  Barriers to Learning: None  Education Outcome: Verbalized understanding             AM-PAC Score        AM-PAC Inpatient Daily Activity Raw Score: 16 (02/21/23 1624)  AM-PAC Inpatient ADL T-Scale Score : 35.96 (02/21/23 1624)  ADL Inpatient CMS 0-100% Score: 53.32 (02/21/23 1624)  ADL Inpatient CMS G-Code Modifier : CK (02/21/23 1624)    Tinneti Score       Goals  Short Term Goals  Time Frame for Short Term Goals: 1 week (2/24) unless noted  Short Term Goal 1: Pt will complete ambulatory transfers with SBA; 2/21 STG met  Short Term Goal 2: Pt will tolerate >3 mins stance at sink during ADLs with SBA; 2/21 STG met  Short Term Goal 3: Pt will complete LB dressing with min A  Short Term Goal 4: Pt will complete x15 BUE exercises for strength and endurance; 2/21 STG met, only limited by IV site  Patient Goals   Patient goals : \"I want to be able to travel back to [place]\"       Therapy Time   Individual Concurrent Group Co-treatment   Time In 1310         Time Out 1347         Minutes [address]

## 2023-02-21 NOTE — PROGRESS NOTES
Hospitalist Progress Note      PCP: YANN Rosales - CNP    Date of Admission: 2/13/2023    Chief Complaint: SOB      Hospital Course: reviewed       Subjective:  EMR and note reviewed pt seen and examined. Sitting up in chair NAD, eating yogurt tolerating well. Medications:  Reviewed    Infusion Medications    heparin (PORCINE) Infusion 1,380 Units/hr (02/20/23 2215)    sodium chloride Stopped (02/19/23 0730)    sodium chloride       Scheduled Medications    melatonin  10 mg Oral Nightly    dexamethasone  6 mg Oral Daily    potassium bicarb-citric acid  20 mEq Oral Daily    docusate sodium  100 mg Oral Daily    ascorbic acid  500 mg Oral Daily    zinc sulfate  50 mg Oral QPM    losartan  50 mg Oral BID    And    hydroCHLOROthiazide  12.5 mg Oral Daily    pantoprazole  40 mg Oral QAM AC    atorvastatin  40 mg Oral Nightly    thyroid  30 mg Oral Once per day on Mon Wed Fri    thyroid  60 mg Oral Once per day on Sun Tue Thu Sat    sodium chloride flush  5-40 mL IntraVENous 2 times per day     PRN Meds: traMADol, diphenhydrAMINE, heparin (porcine), heparin (porcine), sodium chloride flush, sodium chloride, ondansetron **OR** ondansetron, polyethylene glycol, acetaminophen **OR** acetaminophen, perflutren lipid microspheres, ipratropium-albuterol      Intake/Output Summary (Last 24 hours) at 2/21/2023 1017  Last data filed at 2/21/2023 1491  Gross per 24 hour   Intake 1840 ml   Output 7800 ml   Net -5960 ml       Physical Exam Performed:    BP 86/60   Pulse (!) 108   Temp 97.8 °F (36.6 °C) (Oral)   Resp 20   Ht 5' 1\" (1.549 m)   Wt 190 lb 14.4 oz (86.6 kg)   SpO2 90%   BMI 36.07 kg/m²     General appearance: No apparent distress, appears stated age and cooperative. HEENT: Pupils equal, round, and reactive to light. Conjunctivae/corneas clear. Neck: Supple, with full range of motion. No jugular venous distention. Trachea midline. Respiratory:  improved respiratory effort.  Clear to auscultation, bilaterally without Wheezes/Rhonchi. Cardiovascular: Regular rate and rhythm with normal S1/S2 without murmurs, rubs or gallops. Abdomen: Soft, non-tender, non-distended with normal bowel sounds. Musculoskeletal: No clubbing, cyanosis or edema bilaterally. Full range of motion without deformity. Skin: Skin color, texture, turgor normal.  No rashes or lesions. Neurologic:  Neurovascularly intact without any focal sensory/motor deficits. Cranial nerves: II-XII intact, grossly non-focal.  Psychiatric: Alert and oriented, thought content appropriate, normal insight  Capillary Refill: Brisk, 3 seconds, normal   Peripheral Pulses: +2 palpable, equal bilaterally       Labs:   Recent Labs     02/20/23  0749   WBC 16.7*   HGB 11.6*   HCT 37.0          Recent Labs     02/19/23  0629 02/20/23  0644 02/21/23  0448    146* 142   K 3.7 3.8 4.0   * 107 102   CO2 24 25 26   BUN 16 18 17   CREATININE 0.7 0.7 0.6   CALCIUM 9.4 9.7 9.6   PHOS 3.4 3.9 3.7       No results for input(s): AST, ALT, BILIDIR, BILITOT, ALKPHOS in the last 72 hours. Recent Labs     02/20/23  0644   INR 0.99       No results for input(s): Hellen Rowels in the last 72 hours. Urinalysis:    No results found for: Bo Cheneybert, BACTERIA, RBCUA, BLOODU, SPECGRAV, [address]    Radiology:  CT BIOPSY DEEP BONE PERCUTANEOUS   Final Result   Successful CT guided core biopsy of the iliac bone. CT GUIDED NEEDLE PLACEMENT   Final Result   Successful CT guided core biopsy of the iliac bone. CT ABDOMEN PELVIS W IV CONTRAST Additional Contrast? None   Final Result   Widespread hepatic metastasis. Widespread bony osseous metastatic disease      Dominant cystic nodule in the pelvis. This is not clearly benign. Consider   pelvic ultrasound to rule out any complex internal features. Pleural-parenchymal disease at the lung bases.   Please see recent chest CT   report         XR CHEST PORTABLE   Final Result No significant interval change. Patchy bilateral interstitial and airspace   opacities. Left suprahilar opacity. CT CHEST PULMONARY EMBOLISM W CONTRAST   Final Result   1. Acute bilateral pulmonary embolism is confirmed. 2. No significant right ventricular strain. 3. 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 Pulmonary follow-up is recommended. 4. Mediastinal and bilateral hilar lymphadenopathy. 5. Multiple small pulmonary nodules bilaterally. Pulmonary metastasis cannot   be excluded. 6. Suspected malignant left pleural effusion. 7. Suspected hepatic metastasis. 8. Mosaic attenuation throughout the lungs likely in association with known   pulmonary emboli. 9. Small pericardial effusion noted incidentally. Findings were discussed with the emergency room clinician at 1:15 pm on   2/13/2023. VL Extremity Venous Left   Final Result      XR CHEST PORTABLE   Final Result   1. Prominence of the left superior mediastinal border. It is unclear if   this represents a paramediastinal mass or lymphadenopathy. Recommend further   evaluation with a CT chest as there are no prior studies for comparison. 2.  Diffuse airspace infiltrates. These are indeterminate this could   represent pneumonia or edema.          MRI BRAIN W WO CONTRAST    (Results Pending)       IP CONSULT TO CARDIOLOGY  IP CONSULT TO PULMONOLOGY  IP CONSULT TO ONCOLOGY  IP CONSULT TO VASCULAR SURGERY  IP CONSULT TO INFECTIOUS DISEASES  IP CONSULT TO PHYSICAL MEDICINE REHAB    Assessment/Plan:    Active Hospital Problems    Diagnosis     Pneumonia due to COVID-19 virus [U07.1, J12.82]      Priority: Medium    Mass of left lung [R91.8]      Priority: Medium    Mediastinal adenopathy [R59.0]      Priority: Medium    Hilar adenopathy [R59.0]      Priority: Medium    Metastasis to liver ([address] [C78.7]      Priority: Medium    Acute deep vein thrombosis (DVT) of proximal vein of left lower extremity (HCC) [I82.4Y2]      Priority: Medium    Bilateral pulmonary embolism ([address] [I26.99]      Priority: Medium    Elevated troponin [R77.8]      Priority: Medium    Deep venous thrombosis (HCC) [I82.409]      Priority: Medium    Pericardial effusion [I31.39]      Priority: Medium    Pleural effusion on left [J90]      Priority: Medium    Acute respiratory failure (HCC) [J96.00]      Priority: Medium        Acute hypoxic resp failure- likely multifactorial(pna, PE, ?new lung mass)  -tx underlying isuses  -supp ox, weaned as tolerated  -pulm consulted, apprec recs   -iv dexamethasone started 2/14 for covid, tx for 10 days     Abn CT- with lung mass/suspected liver mets  -hemonc consulted/pulm consulted  -IR biopsy done 2/20 of bone lesion(left iliac bone)        Possible pna- ?related to COVID infection, pt is vaccinated with Moderna(4 shots)  -Iv rocephin/azithro continued  -precautions  -Bc pending  -Checked procal and wnl   -vit c/zinc started  -check vit d level(wnl), ldh/crp/esr  -started on iv steroids, end on 2/23 for 10 day course   -ID consulted given lack of improvement, no indication for immunomodulators at this time   -end covid isolation on EOD 2/20/23    VTE- with new PE and DVT, POA  -heparin ggt started, if no further invasive procedure can transition to NOAC   -vasc surg consulted by cards, CTPA reviewed, no intervention needed at this time given PE in distal arteries     Elevated BNP/troponin - likely from rt heart strain, related to above  -Asa given  -Echo done(ef 56%, rv pressure overload, rv systolic fcn dec'd, trival circum pericard effusion)  -Cards consulted  -trended angel     Htn-held hctz/arb for now     Hld- on statin     Thyroid- on armour home dosing     Gerd- on ppi      DVT Prophylaxis: heparin ggt(was on hold for biopsy)  Diet: ADULT DIET;  Regular  Code Status: Full Code  PT/OT Eval Status: ordered 2/16 pm, rec IP rehab    Dispo - pending workup, bx results, pulm/onc recs    Appropriate for A1 Discharge Unit: No      Stephena Normas, APRN - CNP

## 2023-02-21 NOTE — PLAN OF CARE
Problem: Discharge Planning  Goal: Discharge to home or other facility with appropriate resources  Outcome: Progressing     Problem: Skin/Tissue Integrity  Goal: Absence of new skin breakdown  Description: 1. Monitor for areas of redness and/or skin breakdown  2. Assess vascular access sites hourly  3. Every 4-6 hours minimum:  Change oxygen saturation probe site  4. Every 4-6 hours:  If on nasal continuous positive airway pressure, respiratory therapy assess nares and determine need for appliance change or resting period.   Outcome: Progressing     Problem: Safety - Adult  Goal: Free from fall injury  Outcome: Progressing     Problem: Pain  Goal: Verbalizes/displays adequate comfort level or baseline comfort level  Outcome: Progressing     Problem: Infection - Adult  Goal: Absence of infection at discharge  Outcome: Progressing     Problem: Respiratory - Adult  Goal: Achieves optimal ventilation and oxygenation  Outcome: Progressing     Problem: Cardiovascular - Adult  Goal: Maintains optimal cardiac output and hemodynamic stability  Outcome: Progressing  Goal: Absence of cardiac dysrhythmias or at baseline  Outcome: Progressing     Problem: Musculoskeletal - Adult  Goal: Return mobility to safest level of function  Outcome: Progressing  Goal: Return ADL status to a safe level of function  Outcome: Progressing     Problem: Nutrition Deficit:  Goal: Optimize nutritional status  Outcome: Progressing

## 2023-02-22 ENCOUNTER — TELEPHONE (OUTPATIENT)
Dept: FAMILY MEDICINE CLINIC | Age: 68
End: 2023-02-22

## 2023-02-22 VITALS
DIASTOLIC BLOOD PRESSURE: 84 MMHG | HEART RATE: 103 BPM | OXYGEN SATURATION: 92 % | WEIGHT: 183.6 LBS | RESPIRATION RATE: 16 BRPM | TEMPERATURE: 98.1 F | SYSTOLIC BLOOD PRESSURE: 119 MMHG | BODY MASS INDEX: 34.66 KG/M2 | HEIGHT: 61 IN

## 2023-02-22 LAB
ANTI-XA UNFRAC HEPARIN: 0.52 IU/ML (ref 0.3–0.7)
ANTI-XA UNFRAC HEPARIN: >1.1 IU/ML (ref 0.3–0.7)

## 2023-02-22 PROCEDURE — 85520 HEPARIN ASSAY: CPT

## 2023-02-22 PROCEDURE — 2580000003 HC RX 258: Performed by: INTERNAL MEDICINE

## 2023-02-22 PROCEDURE — 2700000000 HC OXYGEN THERAPY PER DAY

## 2023-02-22 PROCEDURE — 6370000000 HC RX 637 (ALT 250 FOR IP): Performed by: NURSE PRACTITIONER

## 2023-02-22 PROCEDURE — 6360000002 HC RX W HCPCS: Performed by: INTERNAL MEDICINE

## 2023-02-22 PROCEDURE — 6370000000 HC RX 637 (ALT 250 FOR IP): Performed by: INTERNAL MEDICINE

## 2023-02-22 PROCEDURE — 94761 N-INVAS EAR/PLS OXIMETRY MLT: CPT

## 2023-02-22 RX ORDER — DEXAMETHASONE 4 MG/1
4 TABLET ORAL EVERY 12 HOURS SCHEDULED
Status: DISCONTINUED | OUTPATIENT
Start: 2023-02-22 | End: 2023-02-22 | Stop reason: HOSPADM

## 2023-02-22 RX ORDER — HEPARIN SODIUM 10000 [USP'U]/100ML
1190 INJECTION, SOLUTION INTRAVENOUS CONTINUOUS
Status: DISCONTINUED | OUTPATIENT
Start: 2023-02-22 | End: 2023-02-22

## 2023-02-22 RX ORDER — DEXAMETHASONE 4 MG/1
4 TABLET ORAL EVERY 12 HOURS SCHEDULED
Qty: 20 TABLET | Refills: 0 | Status: SHIPPED | OUTPATIENT
Start: 2023-02-22 | End: 2023-03-04

## 2023-02-22 RX ORDER — ASCORBIC ACID 500 MG
500 TABLET ORAL DAILY
Qty: 30 TABLET | Refills: 3 | COMMUNITY
Start: 2023-02-23

## 2023-02-22 RX ADMIN — LOSARTAN POTASSIUM 50 MG: 25 TABLET, FILM COATED ORAL at 08:30

## 2023-02-22 RX ADMIN — POTASSIUM BICARBONATE 20 MEQ: 782 TABLET, EFFERVESCENT ORAL at 08:30

## 2023-02-22 RX ADMIN — DOCUSATE SODIUM 100 MG: 100 CAPSULE, LIQUID FILLED ORAL at 08:30

## 2023-02-22 RX ADMIN — DEXAMETHASONE 4 MG: 4 TABLET ORAL at 08:30

## 2023-02-22 RX ADMIN — PANTOPRAZOLE SODIUM 40 MG: 40 TABLET, DELAYED RELEASE ORAL at 05:23

## 2023-02-22 RX ADMIN — SODIUM CHLORIDE, PRESERVATIVE FREE 10 ML: 5 INJECTION INTRAVENOUS at 08:30

## 2023-02-22 RX ADMIN — THYROID, PORCINE 30 MG: 30 TABLET ORAL at 05:22

## 2023-02-22 RX ADMIN — HYDROCHLOROTHIAZIDE 12.5 MG: 12.5 CAPSULE ORAL at 08:30

## 2023-02-22 RX ADMIN — OXYCODONE HYDROCHLORIDE AND ACETAMINOPHEN 500 MG: 500 TABLET ORAL at 08:30

## 2023-02-22 RX ADMIN — APIXABAN 10 MG: 5 TABLET, FILM COATED ORAL at 10:22

## 2023-02-22 NOTE — CARE COORDINATION
CASE MANAGEMENT DISCHARGE SUMMARY      [place] [facility] [place]  Hospital Exemption Notification (HENS) completed: NA    IMM given: (date) NA    New Durable Medical Equipment ordered/agency: [employer] home and portable oxygen 3-4 liters     Transportation:    Family/car: yes       Confirmed discharge plan with:     Patient: yes     Family:  yes/Amadeo,      Facility/Agency, name:  [facility] faxed        RN, name: Tera Nicole    Note: Discharging nurse to complete [facility], reconcile [facility], and place final copy with patient's discharge packet. RN to ensure that written prescriptions for  Level II medications are sent with patient to the facility as per protocol.

## 2023-02-22 NOTE — PLAN OF CARE
Problem: Discharge Planning  Goal: Discharge to home or other facility with appropriate resources  Outcome: Progressing     Problem: Skin/Tissue Integrity  Goal: Absence of new skin breakdown  Description: 1. Monitor for areas of redness and/or skin breakdown  2. Assess vascular access sites hourly  3. Every 4-6 hours minimum:  Change oxygen saturation probe site  4. Every 4-6 hours:  If on nasal continuous positive airway pressure, respiratory therapy assess nares and determine need for appliance change or resting period.   Outcome: Progressing     Problem: Safety - Adult  Goal: Free from fall injury  Outcome: Progressing     Problem: Pain  Goal: Verbalizes/displays adequate comfort level or baseline comfort level  Outcome: Progressing     Problem: Infection - Adult  Goal: Absence of infection at discharge  Outcome: Progressing     Problem: Respiratory - Adult  Goal: Achieves optimal ventilation and oxygenation  Outcome: Progressing     Problem: Cardiovascular - Adult  Goal: Maintains optimal cardiac output and hemodynamic stability  Outcome: Progressing  Goal: Absence of cardiac dysrhythmias or at baseline  Outcome: Progressing     Problem: Musculoskeletal - Adult  Goal: Return mobility to safest level of function  Outcome: Progressing  Goal: Return ADL status to a safe level of function  Outcome: Progressing     Problem: Nutrition Deficit:  Goal: Optimize nutritional status  Outcome: Progressing     Problem: Hematologic - Adult  Goal: Maintains hematologic stability  Outcome: Progressing

## 2023-02-22 NOTE — ADT AUTH CERT
Viral Illness, Acute - Care Day 10 (2/22/2023) by Angelika Willis, RN       Review Status Review Entered   Completed 2/22/2023 1313       Created By   Angelika Willis RN      Criteria Review      Care Day: 10 Care Date: 2/22/2023 Level of Care: Telemetry    Guideline Day 3    Clinical Status    (X) * Hemodynamic stability    2/22/2023 1:13 PM EST by Loc Roach      /84 P 94    (X) * Afebrile or temperature acceptable for next level of care    2/22/2023 1:13 PM EST by Loc Roach      Temp 97.5    (X) * Tachypnea absent    2/22/2023 1:13 PM EST by Loc Roach      RR 17    (X) * Hypoxemia absent    2/22/2023 1:13 PM EST by Marion Cherry on 4 L via via NC    (X) * Mental status at baseline    2/22/2023 1:13 PM EST by Elias Valverde    (X) * Renal function at baseline, or stable and acceptable for next level of care    (X) * Discharge plans and education understood    2/22/2023 1:13 PM EST by Katya Ch    Activity    (X) * Ambulatory or acceptable for next level of care    2/22/2023 1:13 PM EST by Jyoti Calabrese with assistance    Routes    (X) * Oral hydration    (X) * Oral medications or regimen acceptable for next level of care    2/22/2023 1:13 PM EST by Jyoti Calabrese home meds    (X) * Oral diet or acceptable for next level of care    2/22/2023 1:13 PM EST by Malcolm Wray DIET;  Regular    Interventions    (X) * Isolation not indicated, or is performable at next level of care    (X) Pulse oximetry    2/22/2023 1:13 PM EST by Loc Roach      Pulse ox with vitals    Medications    (X) * Antimicrobial medication absent or regimen established for next level of care    (X) Possible corticosteroid    2/22/2023 1:13 PM EST by Loc Roach      (DECADRON) tablet 4 mg PO BID    (X) Possible DVT prophylaxis    2/22/2023 1:13 PM EST by Loc Roach      apixaban (ELIQUIS) tablet 10 mg PO BID       Definitions for Care Day 10 Hemodynamic stability    (X) Hemodynamic stability, as indicated by  1 or more  of the following :       (X) Hemodynamic abnormalities at baseline or acceptable for next level of care       (X) Patient hemodynamically stable, as indicated by  ALL  of the following  (1) (2) (3) (4) (5):          (X) Tachycardia absent          (X) Hypotension absent          (X) No evidence of inadequate perfusion (eg, no myocardial ischemia)          (X) No other hemodynamic abnormalities (eg, no Orthostatic hypotension)       Tachycardia absent    (X) Tachycardia absent, as indicated by  1 or more  of the following  (1) (2):       (X) Heart rate less than or equal to 100 beats per minute in adult or child 6 years or older       Hypotension absent    (X) Hypotension absent, as indicated by  1 or more  of the following  (1) (2) (3) (4):       (X) SBP greater than or equal to 90 mm Hg and without recent decrease greater than 40 mm Hg from       baseline in adult or child 10 years or older       Tachypnea absent    (X) Tachypnea absent, as indicated by respiratory rate of  1 or more  of the following  (1) (2):       (X) Less than or equal to 18 breaths per minute in adult or child 15years of age or older       Hypoxemia absent    (X) Hypoxemia absent, as indicated by  1 or more  of the following  (1):       (X) Oxygen requirements are stable and arranged for at next level of care. * Milestone   Additional Notes   DATE: 02/22/23, CD 10          PERTINENT UPDATES:   Remains on 4 L via NC      VITALS:   98.1 (36.7) 16 103Abnormal 119/84  92 % on 4 L via NC       Oxygen documentation:      1. O2 saturation at REST on ROOM AIR = _88____%      If saturation is 89% or above please proceed with steps 2 and 3. .........      2. O2 saturation with AMBULATION of _5____ feet on ROOM AIR = _85____%   3.  O2 saturation with AMBULATION on current liter flow = __3____%      ABNL/PERTINENT LABS/RADIOLOGY/DIAGNOSTIC STUDIES:      Anti-XA Unfrac Heparin >1.10       PHYSICAL EXAM:   LUNGS: no increased work of breathing and no wheezing/rhonchi/rales. No accessory muscle use. MD CONSULTS/ASSESSMENT AND PLAN:   Oncology   ASSESSMENT AND PLAN:           1.) Adenocarcinoma of the LLL of the lung   - concern for small cell lung cancer    - CTPA, 2/13/2023, showed a 3.5 x 3.5 cm left hilar mass, left pleural effusion, multiple liver mets. Bone mets seemed likely. - CT abd/pelvis, 2/20/2023, showed extensive liver mets, extensive bone mets. - MRI of the brain, 2/21/2022, showed numerous small brain mets, both supratentorial and infratentorial.   - CT-guided biopsy of the left iliac bone, 2/20/2023, showed moderately diff adenocarcinoma. IHC was consistent with a lung primary. - Caris tumor profiling will be requested. Caris tumor profiling was ordered today (2/22/2023). - Will need outpatient follow up scheduled at discharge. 2.) Brain mets   - MRI of the brain, 2/21/2022, showed numerous small brain mets, both supratentorial and infratentorial.   - Switch Dex to 4 mg BID. - Will discuss with rad onc. WBRT vs GK to some of the more pressing lesions and then see if she has a targeted option. While unconventional, radiation oncology will discuss the possibility of GK to the larger mates (any over 1 cm) and then following the smaller ones until we have information regarding whether a targeted therapy is available. If NS is not comfortable with this, then WBRT is definitely indicated. .       3.) PE   - hypercoagulable provocation from likely underlying malignancy    - CTPA, 2/13/2023, showed bilateral PEs.   - On UFH. Okay to switch to Eliquis from a med onc point of view.        4.) SARS-CoV-2   - SARS-CoV-2 PCR positive, 2/13/2023.   - 10 days isolation dating back to 02/11/2023   - CTPA, 2/13/2023, showed bilateral diffuse patchy GGO.   - She is on 4 LPM by NC    - Started Dex, 2/14/2023.   - completed isolation 02/20   - received IV rocephin/azithro                 MEDICATIONS:   (DECADRON) tablet 4 mg PO BID    apixaban (ELIQUIS) tablet 10 mg PO BID          ORDERS:   Home O2 eval (desaturation screen)          CM Note:       Referral received from CM to follow for home care services. I will follow for needs, and speak with patient to verify demos. I have submitted for approval, and will update when I'm able. Viral Illness, Acute - Care Day 9 (2/21/2023) by Mimi Bryant RN       Review Status Review Entered   Completed 2/22/2023 1254       Created By   Mimi Bryant RN      Criteria Review      Care Day: 9 Care Date: 2/21/2023 Level of Care: Telemetry    Guideline Day 2    Level Of Care    (X) Floor    Clinical Status    (X) * Hypotension absent    2/22/2023 12:54 PM EST by Jo Ann Cancer      Borderline hypotension- BP 97/64    (X) * No requirement for mechanical ventilation    ( ) * Oxygenation at baseline or improved    2/22/2023 12:54 PM EST by Jo Ann Cancer      remains on 4 L via NC    (X) * Mental status at baseline    2/22/2023 12:54 PM EST by Jo Ann Cancer      Baseline    Activity    (X) Advance activity as tolerated    2/22/2023 12:54 PM EST by Liseth Muhammad with assistance    Routes    (X) * Oral hydration    (X) Oral or IV medications    2/22/2023 12:54 PM EST by Jo Ann Cancer      Lasix 20 mg IV QD  Heparin 25,000 Units IV @ 1380 units/hr  Zofran 4 mg IV x 1    (X) Usual diet    2/22/2023 12:54 PM EST by [facility]      ADULT DIET;  Regular    Interventions    (X) Pulse oximetry    2/22/2023 12:54 PM EST by Jo Ann Cancer      Pulse ox with vitals    (X) Possible oxygen    2/22/2023 12:54 PM EST by Jo Ann Cancer      4 L via NC    Medications    (X) Possible corticosteroid    2/22/2023 12:54 PM EST by Jo Ann Cancer      dexamethasone (DECADRON) tablet 6 mg PO QD    (X) Possible DVT prophylaxis       Definitions for Care Day 9    Hypotension absent    (X) Hypotension absent, as indicated by  1 or more  of the following  (1) (2) (3) (4):       (X) SBP greater than or equal to 90 mm Hg and without recent decrease greater than 40 mm Hg from       baseline in adult or child 10 years or older       * Milestone   Additional Notes   DATE: 02/21/23, CD 9          PERTINENT UPDATES:   remains on 4 L via NC    Heparin Drip   IV lasix   + Tachycardia   rec IP rehab   experienced likely vaso vagal response to IV site bleeding. Experienced nausea/dizziness and hypotension with sight of blood. VITALS:   97.8 (36.6) RR 20 P 103-110 BP 86/60, 97/64 93 % on 4 L via NC       ABNL/PERTINENT LABS/RADIOLOGY/DIAGNOSTIC STUDIES:   MRI BRAIN    Impression:        Numerous punctate metastases scattered throughout the brain parenchyma in the    supratentorial and infratentorial compartments of the brain. There is also    an enhancing lesion that is extra-axial in location near the medial aspect of    the right temporal lobe/lateral to the cavernous sinus. This appears to be    extra-axial and may represent an extradural metastasis versus an incidental    meningioma. There are 1 or 2 skull metastases identified. There is mild cerebral atrophy. There are minimal chronic small vessel    ischemic changes. Glucose 107   Anti-XA Unfrac Heparin 0.63, 0.19, 0.89       PHYSICAL EXAM:   Respiratory:  Clear to auscultation, bilaterally without Wheezes/Rhonchi. MD CONSULTS/ASSESSMENT AND PLAN:   Hematology Oncology   ASSESSMENT AND PLAN:           1.) Left lung mass   - concern for small cell lung cancer    - CTPA, 2/13/2023, showed a 3.5 x 3.5 cm left hilar mass, left pleural effusion, multiple liver mets. Bone mets seemed likely. - had CT guided biopsy of bone lesion this morning. Radiology felt this was safer compared to liver biopsy.  Results still pending.    - PET/CT as an outpatient.    - MRI brain ordered and pending today   - Caris tumor profiling will be requested after the biopsy has been obtained outpatient   - will need outpatient follow up scheduled at discharge        2.) PE   - hypercoagulable provocation from likely underlying malignancy    - CTPA, 2/13/2023, showed bilateral PEs.   - On UFH.        3.) SARS-CoV-2   - SARS-CoV-2 PCR positive, 2/13/2023.   - 10 days isolation dating back to 02/11/2023   - CTPA, 2/13/2023, showed bilateral diffuse patchy GGO.   - She is on 4 LPM by NC    - Started Dex, 2/14/2023.   - completed isolation 02/20   - received IV rocephin/azithro           IM   Acute hypoxic resp failure- likely multifactorial(pna, PE, ?new lung mass)   -tx underlying isuses   -supp ox, weaned as tolerated   -pulm consulted, apprec recs    -iv dexamethasone started 2/14 for covid, tx for 10 days       Abn CT- with lung mass/suspected liver mets   -hemonc consulted/pulm consulted   -IR biopsy done 2/20 of bone lesion(left iliac bone)           Possible pna- ?related to COVID infection, pt is vaccinated with Moderna(4 shots)   -Iv rocephin/azithro continued   -precautions   -Bc pending   -Checked procal and wnl    -vit c/zinc started   -check vit d level(wnl), ldh/crp/esr   -started on iv steroids, end on 2/23 for 10 day course    -ID consulted given lack of improvement, no indication for immunomodulators at this time    -end covid isolation on EOD 2/20/23       VTE- with new PE and DVT, POA   -heparin ggt started, if no further invasive procedure can transition to NOAC    -vasc surg consulted by cards, CTPA reviewed, no intervention needed at this time given PE in distal arteries       Elevated BNP/troponin - likely from rt heart strain, related to above   -Asa given   -Echo done(ef 56%, rv pressure overload, rv systolic fcn dec'd, trival circum pericard effusion)   -Jeremías consulted   -trended angel       Htn-held hctz/arb for now       Hld- on statin       Thyroid- on armour home dosing       Gerd- on ppi        DVT Prophylaxis: heparin ggt(was on hold for biopsy)   Diet: ADULT DIET;  Regular Code Status: Full Code   PT/OT Eval Status: ordered 2/16 pm, rec IP rehab          MEDICATIONS:   Lasix 20 mg IV QD   Heparin 25,000 Units IV @ 1380 units/hr   Zofran 4 mg IV x 1    dexamethasone (DECADRON) tablet 6 mg PO QD          ORDERS:   MRI BRAIN W WO CONTRAST       PT/OT/SLP/CM ASSESSMENT OR NOTES:   PT   AM-PAC Inpatient Mobility Raw Score : 17      OT   AM-PAC Inpatient Daily Activity Raw Score: 16 (02/21/23 1624)            Viral Illness, Acute - Care Day 8 (2/20/2023) by Patience Campuzano RN       Review Status Review Entered   Completed 2/22/2023 1222       Created By   Patience Campuzano RN      Criteria Review      Care Day: 8 Care Date: 2/20/2023 Level of Care: Telemetry    Guideline Day 2    Level Of Care    (X) Floor    Clinical Status    (X) * Hypotension absent    2/22/2023 12:22 PM EST by Bath Glance      /82 P 101-113    (X) * No requirement for mechanical ventilation    ( ) * Oxygenation at baseline or improved    2/22/2023 12:22 PM EST by Bath Glance      on 4 L via NC    (X) * Mental status at baseline    2/22/2023 12:22 PM EST by Lenard Glance      Baseline    Activity    (X) Advance activity as tolerated    2/22/2023 12:22 PM EST by Patt Lara with assistance    Routes    (X) * Oral hydration    (X) Oral or IV medications    2/22/2023 12:22 PM EST by Bath Glance      Lasix 20 mg IV QD  Heparin 5000 Units IV x 1   Heparin 25,000 Units IV @ 1380 units/hr  Fentantyl 50 mcg IV x 1   Versed 1 mg IV x 1    (X) Usual diet    2/22/2023 12:22 PM EST by Bath Glance      ADULT DIET;  Regular    Interventions    (X) Pulse oximetry    2/22/2023 12:22 PM EST by Bath Glance      Pulse ox with vitals    (X) Possible oxygen    2/22/2023 12:22 PM EST by Lenard Glance      4 L via nc    Medications    (X) Possible corticosteroid    2/22/2023 12:22 PM EST by Bath Glance      (DECADRON) tablet 6 mg  PO QD    (X) Possible DVT prophylaxis    2/22/2023 12:22 PM EST by Shira Cox, Elise      Heparin drip       Definitions for Care Day 8    Hypotension absent    (X) Hypotension absent, as indicated by  1 or more  of the following  (1) (2) (3) (4):       (X) SBP greater than or equal to 90 mm Hg and without recent decrease greater than 40 mm Hg from       baseline in adult or child 10 years or older       * Milestone   Additional Notes   DATE: 02/20/23, CD 8          PERTINENT UPDATES:   Heparin Drip   On 4 L via NC   IV Lasix    CT Guided Biopsy of Lt iliac bone today    + tachycardia         VITALS:   98.1 (36.7) -113 /90 94 % on 4 L via NC          ABNL/PERTINENT LABS/RADIOLOGY/DIAGNOSTIC STUDIES:   CT ABDOMEN PELVIS    Impression:        Widespread hepatic metastasis. Widespread bony osseous metastatic disease       Dominant cystic nodule in the pelvis. This is not clearly benign. Consider    pelvic ultrasound to rule out any complex internal features. Pleural-parenchymal disease at the lung bases. Please see recent chest CT    report       CT BIOPSY DEEP BONE PERCUTANEOUS    Impression:        Successful CT guided core biopsy of the iliac bone. Na 146   WBC 16.7   Hgb 11.6   Anti-XA Unfrac Heparin <0.10 Low           PHYSICAL EXAM:   Respiratory:  improved respiratory effort. Clear to auscultation, bilaterally without Wheezes/Rhonchi. Noted scattered fine rales seem less today      MD CONSULTS/ASSESSMENT AND PLAN:   Hematology Oncology   ASSESSMENT AND PLAN:           1.) Left lung mass   - CTPA, 2/13/2023, showed a 3.5 x 3.5 cm left hilar mass, left pleural effusion, multiple liver mets. Bone mets seemed likely. - had CT guided biopsy of bone lesion this morning. Radiology felt this was safer compared to liver biopsy. Results pending.    - PET/CT as an outpatient. MRI brain over the weekend or early next week. - Caris tumor profiling will be requested after the biopsy has been obtained.    - will need outpatient follow up scheduled at discharge 2.) PE   - hypercoagulable provocation from likely underlying malignancy    - CTPA, 2/13/2023, showed bilateral PEs.   - On UFH. Holding 4 hours prior to bx and will restart 3-4 hours post bx       3.) SARS-CoV-2   - SARS-CoV-2 PCR positive, 2/13/2023.   - 10 days isolation dating back to 02/11/2023   - CTPA, 2/13/2023, showed bilateral diffuse patchy GGO.   - She is on 5 LPM by NC (stable). - Started Dex, 2/14/2023.   - received IV rocephin/azithro      Radiology   Abdomen/ Pelvis CT performed shows multiple bone lesions including iliac crests. As the pt has been on anticoagulation, The bone lesions would be significantly safer to biopsy rather than the liver. Brief Postoperative Note       Micah Condon   YOB: 1955   [id number]       Pre-operative Diagnosis:  bone lesions       Post-operative Diagnosis: Same       Procedure: CT Guided Biopsy of Lt iliac bone       Anesthesia: Local and Moderate Sedation       Surgeons/Assistants: Toma Lawrence MD       Estimated Blood Loss: less than 5mL       Complications: None       Specimens: Was Obtained: Tissue from target. Findings: Technically successful biopsy of boneTissue sent to pathology.  6 G core biopsy          IM   Acute hypoxic resp failure- likely multifactorial(pna, PE, ?new lung mass)   -tx underlying isuses   -supp ox, weaned as tolerated   -pulm consulted, apprec recs    -iv dexamethasone started 2/14 for covid, tx for 10 days       Abn CT- with lung mass/suspected liver mets   -hemonc consulted/pulm consulted   -IR biopsy done 2/20 of bone lesion(left iliac bone)           Possible pna- ?related to COVID infection, pt is vaccinated with Moderna(4 shots)   -Iv rocephin/azithro continued   -precautions   -Bc pending   -Checked procal and wnl    -vit c/zinc started   -check vit d level(wnl), ldh/crp/esr   -started on iv steroids, end on 2/23 for 10 day course    -ID consulted given lack of improvement, no indication for immunomodulators at this time    -end covid isolation on EOD 2/20/23       VTE- with new PE and DVT, POA   -heparin ggt started   -vasc surg consulted by cards, CTPA reviewed, no intervention needed at this time given PE in distal arteries          ID   COVID-19    Unable to pinpoint specific date of symptom onset, but at least 2/11/23   CXR with very typical findings of viral pneumonitis    She has been getting steroid since 2/14/23     She remains hypoxemic but is clinically improved and supplemental oxygen is being weaned   CRP was moderately elevated at 51 on 2/15/23 and was 5 on repeat 2/18/23   -continue decadron, to complete 10d course, today is day #7/10   -isolation x10d dating from 2/11/23 - through EOD 2/20/23       PE/DVT, pleural effusion, suspected bronchogenic lung cancer w liver met contributing the presenting illness and managed by Hospitalist, Pulm, Onc   S/p BM biopsy today 2/20/23      Pulmonology   Assessment and plan:   Acute respiratory failure, hypoxemic. Probably related to pneumonia process in the lungs, potentially due to COVID-19 infection. Oxygen to keep SaO2 >90%. Weaned down to 4 LPM   Pulmonary embolism. On heparin, echocardiogram with septal flattening suggestive of RV pressure overload. On hold for biopsy today   DVT. Extensive clot in the left leg. On heparin. No obvious risk factors, could be hypercoagulable from likely underlying malignancy, less likely from COVID-19 infection   Lung mass. Proximal mass with adenopathy, could represent small cell lung cancer. The patient is aware that this is a suspected diagnosis, as is her . Hilar and mediastinal adenopathy. Pleural effusion. Likely malignant, small. Not sufficient to yield on cytology to make a definitive diagnosis in many patients. Liver lesions. Likely metastatic. IR performed bone biopsy rather than liver biopsy today. Patient tolerated biopsy well   Pericardial effusion.   Small, unclear etiology   COVID-19 infection, likely COVID 19 pneumonia. Usually mild course after immunization, the older vaccines would not protect against the newer strains. On dexamethasone. Appreciate recommendations by Dr. Kaylene Tyson   DVT prophylaxis. On heparin      MEDICATIONS:   Lasix 20 mg IV QD   Heparin 5000 Units IV x 1    Heparin 25,000 Units IV @ 1380 units/hr   Fentantyl 50 mcg IV x 1    Versed 1 mg IV x 1    (DECADRON) tablet 6 mg  PO QD       ORDERS:   Inpatient consult to Physical Medicine Rehab               Viral Illness, Acute - Care Day 7 (2/19/2023) by Jaxon Ford RN       Review Status Review Entered   Completed 2/22/2023 1210       Created By   Jaoxn Ford RN      Criteria Review      Care Day: 7 Care Date: 2/19/2023 Level of Care: Telemetry    Guideline Day 2    Level Of Care    (X) Floor    2/22/2023 12:10 PM EST by Janeth Mcpherson      M/S Tele    Clinical Status    (X) * Hypotension absent    2/22/2023 12:10 PM EST by Janeth Mcpherson      /78 P 91    (X) * No requirement for mechanical ventilation    ( ) * Oxygenation at baseline or improved    2/22/2023 12:10 PM EST by Janeth Mcpherson      Requiring 5 L via NC    (X) * Mental status at baseline    2/22/2023 12:10 PM EST by Janeth Mcpherson      Baseline    Activity    (X) Advance activity as tolerated    2/22/2023 12:10 PM EST by Janeth Mcpherson      Up with assistance    Routes    (X) * Oral hydration    (X) Oral or IV medications    2/22/2023 12:10 PM EST by Janeth Mcpherson      Lasix 20 mg IV QD  Heparin 25,000 Units IV @ 1380 Units/hr    (X) Usual diet    2/22/2023 12:10 PM EST by Janeth Mcpherson      ADULT DIET;  Regular    Interventions    (X) Pulse oximetry    2/22/2023 12:10 PM EST by Janeth Mcpherson      Pulse ox with vitals    (X) Possible oxygen    2/22/2023 12:10 PM EST by Janeth Mcpherson      5 L via NC    Medications    (X) Possible corticosteroid    2/22/2023 12:10 PM EST by Janeth Mcpherson      (DECADRON) tablet 6 mg  PO QD    (X) Possible DVT prophylaxis    2/22/2023 12:10 PM EST by Qian De Santiago      Heparin drip       Definitions for Care Day 7    Hypotension absent    (X) Hypotension absent, as indicated by  1 or more  of the following  (1) (2) (3) (4):       (X) SBP greater than or equal to 90 mm Hg and without recent decrease greater than 40 mm Hg from       baseline in adult or child 10 years or older       * Milestone   Additional Notes   DATE: 02/19/23, CD 7             PERTINENT UPDATES:   Continues to require 5 L via NC   On heparin drip   IR to do biopsy Monday- 2/20    IV Lasix       VITALS:   98 (36.7) RR 16 P 102 /99 92 % on 5 L via NC       ABNL/PERTINENT LABS/RADIOLOGY/DIAGNOSTIC STUDIES:   Chloride 111   Albumin 2.9   Anti-XA Unfrac Heparin  0.47        PHYSICAL EXAM:   L Breath Sounds Diminished   R Breath Sounds Diminished   Noted scattered fine rales          MD CONSULTS/ASSESSMENT AND PLAN:   IM   Acute hypoxic resp failure- likely multifactorial(pna, PE, ?new lung mass)   -tx underlying isuses   -supp ox, weaned as tolerated   -pulm consulted, apprec recs    -iv dexamethasone started 2/14 for covid, tx for 10 days       Abn CT- with lung mass/suspected liver mets   -hemonc consulted/pulm consulted   -IR biopsy planned 2/20 after asa washout           Possible pna- ?related to COVID infection, pt is vaccinated with Moderna(4 shots)   -Iv rocephin/azithro continued   -precautions   -Bc pending   -Checked procal and wnl    -vit c/zinc started   -check vit d level(wnl), ldh/crp/esr   -started on iv steroids, end on 2/23 for 10 day course    -ID consulted given lack of improvement, no indication for immunomodulators at this time       VTE- with new PE and DVT, POA   -heparin ggt started   -vasc surg consulted by cards, CTPA reviewed, no intervention needed at this time given PE in distal arteries       Elevated BNP/troponin - likely from rt heart strain, related to above   -Asa given   -Echo done(ef 56%, rv pressure overload, rv systolic fcn dec'd, trival circum pericard effusion)   -Cards consulted   -trended angel                  DVT Prophylaxis: heparin ggt   Diet: ADULT DIET; Regular   Diet NPO Exceptions are: Sips of Water with Meds   Code Status: Full Code       PT/OT Eval Status: ordered 2/16 pm, rec IP rehab       Dispo - continued to hold aspirin, IR to do biopsy Monday      Pulmonology   Respiratory failure hypoxemia   Possibly related to infiltrative process in the lung   Secondary to COVID infection   Wean oxygen as tolerated, now at 5 L/min   Having some sinus congestion       Added yesterday    Singulair           COVID   Isolation x10 days dating back from 2/11/2023          Acute pulm embolism   Plan Dopplers of the lower extremity also show DVT    Summary          Extensive acute deep vein thrombosis involving the left femoral vein,     popliteal vein, gastrocnemius veins, posterior tibial, peroneal and soleal     veins. CT scan of the chest done 2/13/2023   Impression:         1. Acute bilateral pulmonary embolism is confirmed. 2. No significant right ventricular strain. 3. 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 Pulmonary follow-up is recommended. 4. Mediastinal and bilateral hilar lymphadenopathy. 5. Multiple small pulmonary nodules bilaterally. Pulmonary metastasis cannot    be excluded. 6. Suspected malignant left pleural effusion. 7. Suspected hepatic metastasis. 8. Mosaic attenuation throughout the lungs likely in association with known    pulmonary emboli. 9. Small pericardial effusion noted incidentally.     Findings were discussed with the emergency room clinician at 1:15 pm on    2/13/2023   Continue with   Heparin drip   We will need to stop this 3 to 4 hours prior to the biopsy of the liver which will take place on Monday           Lung mass/mediastinal adenopathy   Multiple possible metastatic lesions   Plan is to have a CT-guided biopsy of the liver to be done next week   We will have CT-guided liver biopsy done tomorrow morning at 9 AM we will stop heparin no later than 5 AM          MEDICATIONS:   Lasix 20 mg IV QD   Heparin 25,000 Units IV @ 1380 Units/hr    (DECADRON) tablet 6 mg  PO QD          ORDERS:      Telemetry monitoring - 24 hour duration

## 2023-02-22 NOTE — DISCHARGE INSTR - COC
Continuity of Care Form    Patient Name: Farrell Favre   :  1955  MRN:  [id number]    Admit date:  2023  Discharge date:  23    Code Status Order: Full Code   Advance Directives:     Admitting Physician:  No admitting provider for patient encounter. PCP: YANN Ramirez CNP    Discharging Nurse: Ej Montague Unit/Room#: 3385/6117-72  Discharging Unit Phone Number: [phone]    Emergency Contact:   Extended Emergency Contact Information  Primary Emergency Contact: Letitia Sutherland Phone: [phone]  Relation: Child   needed? No  Secondary Emergency Contact: [address] Phone: [phone]  Relation: Spouse   needed? No    Past Surgical History:  Past Surgical History:   Procedure Laterality Date    CT BIOPSY PERCUTANEOUS DEEP BONE  2023    CT BIOPSY PERCUTANEOUS DEEP BONE 2023 Iveth Kiran MD [facility] CT SCAN       Immunization History: There is no immunization history on file for this patient.     Active Problems:  Patient Active Problem List   Diagnosis Code    Acute respiratory failure (HCC) J96.00    Acute deep vein thrombosis (DVT) of proximal vein of left lower extremity (HCC) I82.4Y2    Bilateral pulmonary embolism (HCC) I26.99    Elevated troponin R77.8    Deep venous thrombosis (HCC) I82.409    Pericardial effusion I31.39    Pleural effusion on left J90    Pneumonia due to COVID-19 virus U07.1, J12.82    Mass of left lung R91.8    Mediastinal adenopathy R59.0    Hilar adenopathy R59.0    Metastasis to liver ([facility] [address] C78.7       Isolation/Infection:   Isolation            No Isolation          Patient Infection Status       Infection Onset Added Last Indicated Last Indicated By Review Planned Expiration Resolved Resolved By    None active    Resolved    COVID-19 23 COVID-19 & Influenza Combo   23 Dinorah Arana RN    COVID-19 (Rule Out) 23 COVID-19 & Influenza Combo (Ordered)   02/13/23 Rule-Out Test Resulted            Nurse Assessment:  Last Vital Signs: /84   Pulse (!) 103   Temp 98.1 °F (36.7 °C) (Oral)   Resp 16   Ht 5' 1\" (1.549 m)   Wt 183 lb 9.6 oz (83.3 kg)   SpO2 92%   BMI 34.69 kg/m²     Last documented pain score (0-10 scale): Pain Level: 0  Last Weight:   Wt Readings from Last 1 Encounters:   02/22/23 183 lb 9.6 oz (83.3 kg)     Mental Status:  oriented and alert    IV Access:  - None    Nursing Mobility/ADLs:  Walking   Independent  Transfer  Independent  Bathing  Independent  Dressing  Independent  Toileting  Independent  Feeding  Independent  Med Admin  Independent  Med Delivery   whole    Wound Care Documentation and Therapy:        Elimination:  Continence: Bowel: Yes  Bladder: Yes  Urinary Catheter: None   Colostomy/Ileostomy/Ileal Conduit: No       Date of Last BM:     Intake/Output Summary (Last 24 hours) at 2/22/2023 1325  Last data filed at 2/22/2023 1004  Gross per 24 hour   Intake 1538.18 ml   Output 3250 ml   Net -1711.82 ml     I/O last 3 completed shifts: In: 2018.2 [P.O.:960; I.V.:1058.2]  Out: 6950 [Urine:6950]    Safety Concerns:     None    Impairments/Disabilities:      None    Nutrition Therapy:  Current Nutrition Therapy:   - Oral Diet:  General    Routes of Feeding: Oral  Liquids: No Restrictions  Daily Fluid Restriction: no  Last Modified Barium Swallow with Video (Video Swallowing Test): not done    Treatments at the Time of Hospital Discharge:   Respiratory Treatments:   Oxygen Therapy:  is on oxygen at 3 L/min per nasal cannula.   Ventilator:    - No ventilator support    Rehab Therapies: Physical Therapy and Occupational Therapy  Weight Bearing Status/Restrictions: No weight bearing restrictions  Other Medical Equipment (for information only, NOT a DME order):  n/a  Other Treatments: ***    Patient's personal belongings (please select all that are sent with patient):  Sanjay SINGH SIGNATURE: Electronically signed by Frantz Bowser RN on 2/22/23 at 2:21 PM EST    CASE MANAGEMENT/SOCIAL WORK SECTION    Inpatient Status Date: 2/13/23    Readmission Risk Assessment Score:  Readmission Risk              Risk of Unplanned Readmission:  19           Discharging to Facility/ [address]   [address] [place] [address] Valerie Oneill   [phone]   / signature: Electronically signed by Kimberly Holstein, RN on 2/22/23 at 3:38 PM EST    PHYSICIAN SECTION    Prognosis: Good    Condition at Discharge: Stable    Rehab Potential (if transferring to Rehab): {Prognosis:[id number]}    Recommended Labs or Other Treatments After Discharge:   Recommended Follow-up, Labs or Other Treatments After Discharge:    Home care                Physician Certification: I certify the above information and transfer of Cristina Soliman  is necessary for the continuing treatment of the diagnosis listed and that she requires Home Care for greater 30 days.      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

## 2023-02-22 NOTE — PLAN OF CARE
Oxygen documentation:    1. O2 saturation at REST on ROOM AIR = _88____%    If saturation is 89% or above please proceed with steps 2 and 3. .........    2. O2 saturation with AMBULATION of _5____ feet on ROOM AIR = _85____%  3.  O2 saturation with AMBULATION on current liter flow = __3____%    DCP notified: ______    Last edited by Catherine Orellana RN on 02/22/23 at [phone]

## 2023-02-22 NOTE — TELEPHONE ENCOUNTER
Lisandra Buckner called from  [facility] to obtain verbal orders for Skilled nursing. PT/ OT at Home. Patient is going to be D/C from [facility] today, 2/22/23.    [phone] Roach.

## 2023-02-22 NOTE — PROGRESS NOTES
ONCOLOGY HEMATOLOGY CARE PROGRESS NOTE      SUBJECTIVE:    Afebrile and on 4 LPM by NC. Her  is at the bedside. We spent time discussing her situation. ROS:     Constitutional: fatigue. No weight loss, No fever, No chills, No night sweats.     Eyes:  No impairment or change in vision  ENT / Mouth:  No pain, abnormal ulceration, bleeding, nasal drip or change in voice or hearing  Cardiovascular:  No chest pain, palpitations, new edema, or calf discomfort  Respiratory:  No pain, hemoptysis, change to breathing  Breast:  No pain, discharge, change in appearance or texture  Gastrointestinal:  No pain, cramping, jaundice, change to eating and bowel habits  Urinary:  No pain, bleeding or change in continence  Genitalia: No pain, bleeding or discharge  Musculoskeletal:  No redness, pain, edema or weakness  Skin:  No pruritus, rash, change to nodules or lesions  Neurologic:  No discomfort, change in mental status, speech, sensory or motor activity  Psychiatric:  No change in concentration or change to affect or mood  Endocrine:  No hot flashes, increased thirst, or change to urine production  Hematologic: No petechiae, ecchymosis or bleeding  Lymphatic:  No lymphadenopathy or lymphedema  Allergy / Immunologic:  No eczema, hives, frequent or recurrent infections    OBJECTIVE        Physical    VITALS:  Patient Vitals for the past 24 hrs:   BP Temp Temp src Pulse Resp SpO2 Weight   02/22/23 0747 127/77 97.5 °F (36.4 °C) Oral 94 17 92 % --   02/22/23 0545 -- -- -- -- -- -- 183 lb 9.6 oz (83.3 kg)   02/21/23 2332 137/89 98.1 °F (36.7 °C) Oral 93 16 93 % --   02/21/23 2114 128/80 98.4 °F (36.9 °C) Oral (!) 103 20 93 % --   02/21/23 1525 109/65 98 °F (36.7 °C) Oral (!) 110 20 93 % --   02/21/23 1201 110/65 97.6 °F (36.4 °C) Oral (!) 104 20 93 % --   02/21/23 1129 116/81 -- -- (!) 104 -- 92 % --   02/21/23 0926 86/60 -- -- -- -- 90 % --   02/21/23 0921 97/64 97.8 °F (36.6 °C) Oral (!) 108 20 -- --   02/21/23 0805 (!) 149/86 98 °F (36.7 °C) Oral (!) 102 20 93 % --         24HR INTAKE/OUTPUT:    Intake/Output Summary (Last 24 hours) at 2/22/2023 0751  Last data filed at 2/22/2023 3762  Gross per 24 hour   Intake 1538.18 ml   Output 5150 ml   Net -3611.82 ml         CONSTITUTIONAL: awake, alert, cooperative, no apparent distress   EYES: pupils equal, round and reactive to light, sclera clear and conjunctiva normal  ENT: Normocephalic, without obvious abnormality, atraumatic  NECK: supple, symmetrical, no jugular venous distension and no carotid bruits   HEMATOLOGIC/LYMPHATIC: no cervical, supraclavicular or axillary lymphadenopathy   LUNGS: no increased work of breathing and no wheezing/rhonchi/rales. No accessory muscle use. CARDIOVASCULAR: regular rate and rhythm, normal S1 and S2, no murmur noted  ABDOMEN: normal bowel sounds x 4, soft, non-distended, non-tender, no masses palpated, no hepatosplenomegaly   MUSCULOSKELETAL: full range of motion noted, tone is normal  NEUROLOGIC: awake, alert, oriented to name, place and time. Motor skills grossly intact. SKIN: Normal skin color, texture, turgor and no jaundice.  appears intact   EXTREMITIES: no LE edema     DATA:  CBC:    Recent Labs     02/20/23  0749 02/17/23  0638 02/16/23  0637 02/15/23  0601 02/14/23  0357   WBC 16.7* 13.0* 14.2* 14.1* 17.3*   NEUTROABS  --  9.9* 10.9* 11.2* 13.6*   LYMPHOPCT  --  18.0 19.0 12.7 12.9   RBC 4.30 4.00 3.66* 3.66* 4.05   HGB 11.6* 11.0* 10.3* 10.3* 11.2*   HCT 37.0 34.3* 31.3* 31.4* 34.1*   MCV 85.9 85.9 85.4 85.7 84.2   MCH 27.1 27.4 28.1 28.1 27.6   MCHC 31.5 32.0 32.9 32.7 32.7   RDW 17.6* 17.1* 17.2* 17.4* 17.7*    228 186 157 150         PT/INR:    Recent Labs     02/20/23  0644 02/15/23  0601 02/13/23  1112   PROT  --   --  7.2   INR 0.99 1.16*  --        PTT:    Recent Labs     02/13/23  1305   APTT 26.5         CMP:    Recent Labs     02/21/23  0448 02/20/23  0644 02/19/23  6601 02/17/23  3094 02/16/23  0636 02/14/23  0357 02/13/23  1112    146* 145 145 145   < > 141   K 4.0 3.8 3.7 3.6 3.5   < > 3.3*    107 111* 112* 112*   < > 101   CO2 26 25 24 22 22   < > 24   GLUCOSE 107* 95 90 94 96   < > 131*   BUN 17 18 16 18 17   < > 22*   CREATININE 0.6 0.7 0.7 0.7 0.7   < > 0.8   LABGLOM >60 >60 >60 >60 >60   < > >60   CALCIUM 9.6 9.7 9.4 9.3 9.1   < > 10.4   PROT  --   --   --   --   --   --  7.2   LABALBU 3.5 3.5 2.9*  --   --   --  4.0   AGRATIO  --   --   --   --   --   --  1.3   BILITOT  --   --   --   --   --   --  0.9   ALKPHOS  --   --   --   --   --   --  351*   ALT  --   --   --   --   --   --  21   AST  --   --   --   --   --   --  34   MG 2.20 2.20 2.10  --  2.10   < >  --     < > = values in this interval not displayed. Lab Results   Component Value Date    CALCIUM 9.6 02/21/2023    PHOS 3.7 02/21/2023       LDH:  Recent Labs     02/15/23  0601   *         Radiology Review:  MRI BRAIN W WO CONTRAST  Narrative: EXAMINATION:  MRI OF THE BRAIN WITHOUT AND WITH CONTRAST  2/21/2023 5:54 pm    TECHNIQUE:  Multiplanar multisequence MRI of the head/brain was performed without and  with the administration of intravenous contrast.    COMPARISON:  None. HISTORY:  ORDERING SYSTEM PROVIDED HISTORY: large pulmonary mass with mets to liver and  bone  TECHNOLOGIST PROVIDED HISTORY:  Reason for exam:->large pulmonary mass with mets to liver and bone  Reason for Exam: large pulmonary mass with mets to liver and bone    Initial evaluation    FINDINGS:  INTRACRANIAL STRUCTURES/VENTRICLES:  There is no acute infarct. There is mild  cerebral atrophy. There are multiple punctate areas of high-signal in the  periventricular white matter and centrum semiovale that are partly related to  chronic small vessel ischemic disease.     There are multiple punctate areas of high signal associated with enhancing  lesions scattered throughout the supratentorial and infratentorial brain.  The lesions are too numerous to count. The largest lesion is in the right  cerebellum and measures 1.1 cm. There is a lesion in the medial temporal  lobe on the right measuring 1.8 x 1.4 cm that appears to be extra-axial in  location. It  is difficult to say if this is related to an extradural  metastasis or incidental meningioma. ORBITS: The visualized portion of the orbits demonstrate no acute abnormality. SINUSES: There is mild mucoperiosteal thickening of the maxillary sinuses and  ethmoid air cells. The remainder of the sinuses are clear. The mastoid air  cells are clear. BONES/SOFT TISSUES: There is a lesion in the skull in the right frontal  region consistent with metastasis. There is a 2nd tiny skull metastasis in  the right occipital region. Impression: Numerous punctate metastases scattered throughout the brain parenchyma in the  supratentorial and infratentorial compartments of the brain. There is also  an enhancing lesion that is extra-axial in location near the medial aspect of  the right temporal lobe/lateral to the cavernous sinus. This appears to be  extra-axial and may represent an extradural metastasis versus an incidental  meningioma. There are 1 or 2 skull metastases identified. There is mild cerebral atrophy. There are minimal chronic small vessel  ischemic changes.       Problem List  Patient Active Problem List   Diagnosis    Acute respiratory failure (HCC)    Acute deep vein thrombosis (DVT) of proximal vein of left lower extremity (HCC)    Bilateral pulmonary embolism (HCC)    Elevated troponin    Deep venous thrombosis (HCC)    Pericardial effusion    Pleural effusion on left    Pneumonia due to COVID-19 virus    Mass of left lung    Mediastinal adenopathy    Hilar adenopathy    Metastasis to liver [facility])       ASSESSMENT AND PLAN:       1.) Adenocarcinoma of the LLL of the lung  - concern for small cell lung cancer   - CTPA, 2/13/2023, showed a 3.5 x 3.5 cm left hilar mass, left pleural effusion, multiple liver mets. Bone mets seemed likely. - CT abd/pelvis, 2/20/2023, showed extensive liver mets, extensive bone mets. - MRI of the brain, 2/21/2022, showed numerous small brain mets, both supratentorial and infratentorial.  - CT-guided biopsy of the left iliac bone, 2/20/2023, showed moderately diff adenocarcinoma. IHC was consistent with a lung primary. - Caris tumor profiling will be requested. Caris tumor profiling was ordered today (2/22/2023). - Will need outpatient follow up scheduled at discharge. 2.) Brain mets  - MRI of the brain, 2/21/2022, showed numerous small brain mets, both supratentorial and infratentorial.  - Switch Dex to 4 mg BID. - Will discuss with rad onc. WBRT vs GK to some of the more pressing lesions and then see if she has a targeted option. While unconventional, radiation oncology will discuss the possibility of GK to the larger mates (any over 1 cm) and then following the smaller ones until we have information regarding whether a targeted therapy is available. If NS is not comfortable with this, then WBRT is definitely indicated. .    3.) PE  - hypercoagulable provocation from likely underlying malignancy   - CTPA, 2/13/2023, showed bilateral PEs.  - On UFH. Okay to switch to Eliquis from a med onc point of view. 4.) SARS-CoV-2  - SARS-CoV-2 PCR positive, 2/13/2023.  - 10 days isolation dating back to 02/11/2023  - CTPA, 2/13/2023, showed bilateral diffuse patchy GGO.  - She is on 3 LPM by NC this is improving daily.   - Started Dex, 2/14/2023.  - completed isolation 02/20  - received IV rocephin/azithro     4.) Essential hypertension     5.) Hyperlipidemia     6.) Hypothyroidism      ONCOLOGIC DISPOSITION:  Okay for discharge on Dex 4 mg BID.       Juan Franklin MD  Please contact through [facility]

## 2023-02-22 NOTE — CARE COORDINATION
[facility]    Referral received from  to follow for home care services. [facility] unable to accept OON.   Patient has no preference in agency    Referral sent to [facility] ; declined by Lashaun Albarran    Referral sent to [address] [place] [address] accepted spoke reece mancini    [address] [place] [address], [place], [place] [address], [address] [facility] [phone]

## 2023-02-22 NOTE — DISCHARGE SUMMARY
Hospital Medicine Discharge Summary    Patient ID: Mary Anne Lyle      Patient's PCP: YANN Kelly CNP    Admit Date: 2/13/2023     Discharge Date:   2/22/2023    Admitting Provider: No admitting provider for patient encounter. Discharge Provider: Kenia Dalton     Discharge Diagnoses: Active Hospital Problems    Diagnosis     Pneumonia due to COVID-19 virus [U07.1, J12.82]      Priority: Medium    Mass of left lung [R91.8]      Priority: Medium    Mediastinal adenopathy [R59.0]      Priority: Medium    Hilar adenopathy [R59.0]      Priority: Medium    Metastasis to liver [facility]) [C78.7]      Priority: Medium    Acute deep vein thrombosis (DVT) of proximal vein of left lower extremity (HCC) [I82.4Y2]      Priority: Medium    Bilateral pulmonary embolism (HCC) [I26.99]      Priority: Medium    Elevated troponin [R77.8]      Priority: Medium    Deep venous thrombosis (HCC) [I82.409]      Priority: Medium    Pericardial effusion [I31.39]      Priority: Medium    Pleural effusion on left [J90]      Priority: Medium    Acute respiratory failure (HCC) [J96.00]      Priority: Medium       The patient was seen and examined on day of discharge and this discharge summary is in conjunction with any daily progress note from day of discharge. Hospital Course:    79 y.o. female with hx of asthma who presented to [facility] with worsening sob. Pt recalls developing some sob/wheezing/chest tightness approx 1 month ago. Pt (originally from [place]) was doing [employer] work in [place] She recalls visiting family after [place] for approx 10 days and returned to [place] and recalls getting worse. She went to urgent care and given inhalers. She still worsened and went back to different urgent care and given prednisone(with sight improvement).  Son-in-law called pt on 2/11 and noted her to be sob with talking and told pt to come back to [place] for evaluation.  -she saw PCP today and noted sats at 86% on RA. No f/chills and no weight loss/night sweats.    -She recalls some LLE bruising 1.5 weeks ago but 3 weeks ago noted some pain/swelling  -No cp  -she had some nasuea for past 3 days  -she denies viral symptoms (no myalgias/arthralgias/sore throat)    Acute hypoxic resp failure- likely multifactorial(pna, PE, ?new lung mass)  -tx underlying isuses  -supp ox, weaned as tolerated  -pulm consulted, apprec recs   -iv dexamethasone started 2/14 for covid, tx for 10 days  - d/c home on 4L o2     Abn CT- with lung mass/suspected liver mets  -hemonc consulted/pulm consulted  -IR biopsy done 2/20 of bone lesion(left iliac bone)- showed moderately diff adenocarcinoma. IHC consistent with lung primary   - Caris tumor profiling will be requested and ordered per oncology  - MRI of brain 2/21 showed numerous small brain mets   - follow up with oncology outpatient         Possible pna- ?related to COVID infection, pt is vaccinated with Moderna(4 shots)  -Iv rocephin/azithro continued  -precautions  -Bc pending  -Checked procal and wnl   -vit c/zinc started  -check vit d level(wnl), ldh/crp/esr  -started on iv steroids, end on 2/23 for 10 day course   -ID consulted given lack of improvement, no indication for immunomodulators at this time   -end covid isolation on EOD 2/20/23     VTE- with new PE and DVT, POA  -heparin ggt started, if no further invasive procedure can transition to NOAC   -vasc surg consulted by selena, CTPA reviewed, no intervention needed at this time given PE in distal arteries  - home on eliquis      Elevated BNP/troponin - likely from rt heart strain, related to above  -Asa given  -Echo done(ef 56%, rv pressure overload, rv systolic fcn dec'd, trival circum pericard effusion)  -Cards consulted  -trended angel  - outpatient nuclear stress testing. To follow up with Dr. Kalen Samuels in 4-6 weeks.       Physical Exam Performed:     /84   Pulse (!) 103   Temp 98.1 °F (36.7 °C) (Oral)   Resp 16   Ht 5' 1\" (1.549 m)   Wt 183 lb 9.6 oz (83.3 kg)   SpO2 92%   BMI 34.69 kg/m²       General appearance:  No apparent distress, appears stated age and cooperative. HEENT:  Normal cephalic, atraumatic without obvious deformity. Pupils equal, round, and reactive to light. Extra ocular muscles intact. Conjunctivae/corneas clear. Neck: Supple, with full range of motion. No jugular venous distention. Trachea midline. Respiratory:  Normal respiratory effort. Clear to auscultation, bilaterally without Rales/Wheezes/Rhonchi. Cardiovascular:  Regular rate and rhythm with normal S1/S2 without murmurs, rubs or gallops. Abdomen: Soft, non-tender, non-distended with normal bowel sounds. Musculoskeletal:  No clubbing, cyanosis or edema bilaterally. Full range of motion without deformity. Skin: Skin color, texture, turgor normal.  No rashes or lesions. Neurologic:  Neurovascularly intact without any focal sensory/motor deficits. Cranial nerves: II-XII intact, grossly non-focal.  Psychiatric:  Alert and oriented, thought content appropriate, normal insight  Capillary Refill: Brisk,< 3 seconds   Peripheral Pulses: +2 palpable, equal bilaterally       Labs:  For convenience and continuity at follow-up the following most recent labs are provided:      CBC:    Lab Results   Component Value Date/Time    WBC 16.7 02/20/2023 07:49 AM    HGB 11.6 02/20/2023 07:49 AM    HCT 37.0 02/20/2023 07:49 AM     02/20/2023 07:49 AM       Renal:    Lab Results   Component Value Date/Time     02/21/2023 04:48 AM    K 4.0 02/21/2023 04:48 AM    K 3.6 02/17/2023 06:38 AM     02/21/2023 04:48 AM    CO2 26 02/21/2023 04:48 AM    BUN 17 02/21/2023 04:48 AM    CREATININE 0.6 02/21/2023 04:48 AM    CALCIUM 9.6 02/21/2023 04:48 AM    PHOS 3.7 02/21/2023 04:48 AM         Significant Diagnostic Studies    Radiology:   MRI BRAIN W WO CONTRAST   Preliminary Result   Numerous punctate metastases scattered throughout the brain parenchyma in the supratentorial and infratentorial compartments of the brain. There is also   an enhancing lesion that is extra-axial in location near the medial aspect of   the right temporal lobe/lateral to the cavernous sinus. This appears to be   extra-axial and may represent an extradural metastasis versus an incidental   meningioma. There are 1 or 2 skull metastases identified. There is mild cerebral atrophy. There are minimal chronic small vessel   ischemic changes. CT BIOPSY DEEP BONE PERCUTANEOUS   Final Result   Successful CT guided core biopsy of the iliac bone. CT GUIDED NEEDLE PLACEMENT   Final Result   Successful CT guided core biopsy of the iliac bone. CT ABDOMEN PELVIS W IV CONTRAST Additional Contrast? None   Final Result   Widespread hepatic metastasis. Widespread bony osseous metastatic disease      Dominant cystic nodule in the pelvis. This is not clearly benign. Consider   pelvic ultrasound to rule out any complex internal features. Pleural-parenchymal disease at the lung bases. Please see recent chest CT   report         XR CHEST PORTABLE   Final Result   No significant interval change. Patchy bilateral interstitial and airspace   opacities. Left suprahilar opacity. CT CHEST PULMONARY EMBOLISM W CONTRAST   Final Result   1. Acute bilateral pulmonary embolism is confirmed. 2. No significant right ventricular strain. 3. 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 Pulmonary follow-up is recommended. 4. Mediastinal and bilateral hilar lymphadenopathy. 5. Multiple small pulmonary nodules bilaterally. Pulmonary metastasis cannot   be excluded. 6. Suspected malignant left pleural effusion. 7. Suspected hepatic metastasis. 8. Mosaic attenuation throughout the lungs likely in association with known   pulmonary emboli. 9. Small pericardial effusion noted incidentally.    Findings were discussed with the emergency room clinician at 1:15 pm on   2/13/2023. VL Extremity Venous Left   Final Result      XR CHEST PORTABLE   Final Result   1. Prominence of the left superior mediastinal border. It is unclear if   this represents a paramediastinal mass or lymphadenopathy. Recommend further   evaluation with a CT chest as there are no prior studies for comparison. 2.  Diffuse airspace infiltrates. These are indeterminate this could   represent pneumonia or edema. Consults:     IP CONSULT TO CARDIOLOGY  IP CONSULT TO PULMONOLOGY  IP CONSULT TO ONCOLOGY  IP CONSULT TO VASCULAR SURGERY  IP CONSULT TO INFECTIOUS DISEASES  IP CONSULT TO PHYSICAL MEDICINE REHAB  IP CONSULT TO HOME CARE NEEDS    Disposition:  Home with O2 and  home care    Condition at Discharge: Stable    Discharge Instructions/Follow-up:  Cardiology and Oncology     Code Status:  Full Code     Activity: activity as tolerated    Diet: regular diet      Discharge Medications:     Current Discharge Medication List             Details   !! apixaban (ELIQUIS) 5 MG TABS tablet Take 2 tablets by mouth 2 times daily for 13 doses  Qty: 60 tablet, Refills: 2      !! apixaban (ELIQUIS) 5 MG TABS tablet Take 1 tablet by mouth 2 times daily  Qty: 60 tablet, Refills: 2      dexamethasone (DECADRON) 4 MG tablet Take 1 tablet by mouth every 12 hours for 10 days  Qty: 20 tablet, Refills: 0      ascorbic acid (VITAMIN C) 500 MG tablet Take 1 tablet by mouth daily  Qty: 30 tablet, Refills: 3       !! - Potential duplicate medications found. Please discuss with provider.              Details   Coenzyme Q10-Fish Oil-Vit E (CO-Q 10 OMEGA-3 FISH OIL PO) Take by mouth daily      CALCIUM LACTATE PO Take 250 mg by mouth in the morning and at bedtime      Bacillus Coagulans-Inulin (ALIGN PREBIOTIC-PROBIOTIC PO) Take by mouth daily      TURMERIC PO Take 1,000 mg by mouth daily      VITAMIN D PO Take 10,000 Int'l Units by mouth daily Biotin 1000 MCG TABS Take 10,000 mcg by mouth nightly      magnesium lactate (MAG-TAB CR) 84 MG (7MEQ) TBCR extended release tablet Take by mouth nightly      Multiple Vitamins-Minerals (ICAPS AREDS 2 PO) Take 1 tablet by mouth in the morning and at bedtime      L-Lysine 1000 MG TABS Take by mouth daily      Melatonin 5 MG CHEW Take 5 mg by mouth nightly      atorvastatin (LIPITOR) 40 MG tablet atorvastatin 40 mg tablet      !! thyroid (ARMOUR) 30 MG tablet Take one tablet on Monday, Weednesday, and Friday      losartan-hydroCHLOROthiazide (HYZAAR) 100-12.5 MG per tablet Takes half in morning and half at night      omeprazole (PRILOSEC) 40 MG delayed release capsule omeprazole 40 mg capsule,delayed release      !! thyroid (ARMOUR) 60 MG tablet Take 60 mg by mouth daily 1 tablet on Sunday, Tuesday, Thursday and Saturday       !! - Potential duplicate medications found. Please discuss with provider. Time Spent on discharge: 30 in the examination, evaluation, counseling and review of medications and discharge plan. Signed:    Jean Claude Sandersckgurjit   2/22/2023      Thank you YANN Granados CNP for the opportunity to be involved in this patient's care. If you have any questions or concerns, please feel free to contact me at [phone].

## 2023-02-23 ENCOUNTER — TELEPHONE (OUTPATIENT)
Dept: FAMILY MEDICINE CLINIC | Age: 68
End: 2023-02-23

## 2023-02-23 ENCOUNTER — FOLLOWUP TELEPHONE ENCOUNTER (OUTPATIENT)
Dept: TELEMETRY | Age: 68
End: 2023-02-23

## 2023-02-23 NOTE — TELEPHONE ENCOUNTER
1st Attempt; No Answer- Left HIPAA compliant voicemail with Non-Urgent Heart Failure Resource Line number for call back.      Raudel Cortez RN

## 2023-02-24 ENCOUNTER — FOLLOWUP TELEPHONE ENCOUNTER (OUTPATIENT)
Dept: TELEMETRY | Age: 68
End: 2023-02-24

## 2023-02-24 ENCOUNTER — TELEMEDICINE (OUTPATIENT)
Dept: FAMILY MEDICINE CLINIC | Age: 68
End: 2023-02-24
Payer: COMMERCIAL

## 2023-02-24 DIAGNOSIS — G89.3 PAIN DUE TO MALIGNANT NEOPLASM METASTATIC TO BONE (HCC): Primary | ICD-10-CM

## 2023-02-24 DIAGNOSIS — C79.51 PAIN DUE TO MALIGNANT NEOPLASM METASTATIC TO BONE (HCC): Primary | ICD-10-CM

## 2023-02-24 PROCEDURE — 1123F ACP DISCUSS/DSCN MKR DOCD: CPT

## 2023-02-24 PROCEDURE — 99213 OFFICE O/P EST LOW 20 MIN: CPT

## 2023-02-24 RX ORDER — TRAMADOL HYDROCHLORIDE 50 MG/1
50 TABLET ORAL EVERY 6 HOURS PRN
Qty: 28 TABLET | Refills: 0 | Status: SHIPPED | OUTPATIENT
Start: 2023-02-24 | End: 2023-03-10

## 2023-02-24 ASSESSMENT — ENCOUNTER SYMPTOMS
SHORTNESS OF BREATH: 0
COLOR CHANGE: 0
CONSTIPATION: 0
DIARRHEA: 0
ABDOMINAL PAIN: 0
COUGH: 0
BLOOD IN STOOL: 0
SORE THROAT: 0
WHEEZING: 0

## 2023-02-24 NOTE — PROGRESS NOTES
Grazyna Zimmerman (:  1955) is a Established patient, here for evaluation of the following:    Assessment & Plan   Below is the assessment and plan developed based on review of pertinent history, physical exam, labs, studies, and medications. 1. Pain due to malignant neoplasm metastatic to bone (HCC)  -     traMADol (ULTRAM) 50 MG tablet; Take 1 tablet by mouth every 6 hours as needed for Pain for up to 14 days. Intended supply: 14 days. Take lowest dose possible to manage pain Max Daily Amount: 200 mg, Disp-28 tablet, R-0Normal  -During her hospitalization patient was receiving tramadol 50 mg every 6 she states that this worked fairly well at managing her pain. She states that it did start to wear off at the end. Did discuss goals of pain management with patient. Her current goals is to maintain that patient is comfortable and is able to do the things that she is enjoying with and her reasonable limits. We will continue to work with oncologist regarding pain management options. Currently I do believe tramadol every 6 hours will be a good option to help manage her pain. Did educate patient to take the least amount of medication to manage her pain. Also did discuss with patient about only taking 25 mg of tramadol as this could be beneficial to managing her pain. We will continue to have further discussion with patient regarding her pain and management of her pain with adequate medications. Patient is agreeable and understands plan at this time. Subjective   HPI  Patient presents today virtually after being discharged from the hospital after a lengthy stay. Patient was ultimately hospitalized due to hypoxia. Upon hospitalization patient was found to have bilateral pulmonary embolisms as well as a lung mass with mets to the liver bone and brain involvement. During hospitalization patient was ultimately weaned down to 3 L of oxygen. Patient was discharged with home therapy.   Patient states since being home patient is struggled with pain. She is taking more than the recommended amount of Tylenol per day to manage her pain. She states the pain is mainly located in her coccyx as well as radiating pain. She states that the pain is debilitating and does impede with her day-to-day life. She states that she is not as comfortable as she would like. During her hospitalization patient was using tramadol 50 mg every 6 to manage pain. She states that this really helped deal with the pain that she was experiencing. Upon discharge patient was not given any medications to help with pain. Patient's main reason for presenting virtually today is ongoing pain. Review of Systems   HENT:  Negative for congestion and sore throat. Respiratory:  Negative for cough, shortness of breath and wheezing. Cardiovascular:  Negative for chest pain and leg swelling. Gastrointestinal:  Negative for abdominal pain, blood in stool, constipation and diarrhea. Musculoskeletal:  Negative for arthralgias and gait problem. Skin:  Negative for color change. Neurological:  Negative for headaches. Psychiatric/Behavioral:  Positive for confusion. The patient is not nervous/anxious.          Objective   Patient-Reported Vitals  BP Observations: No, remote/electronic monitoring device was not used or able to be verified  Patient-Reported Weight: 183lb  Patient-Reported Height: 5'1       Physical Exam  [INSTRUCTIONS:  \"[x]\" Indicates a positive item  \"[]\" Indicates a negative item  -- DELETE ALL ITEMS NOT EXAMINED]    Constitutional: [x] Appears well-developed and well-nourished [x] No apparent distress      [] Abnormal -     Mental status: [x] Alert and awake  [x] Oriented to person/place/time [x] Able to follow commands    [] Abnormal -     Eyes:   EOM    [x]  Normal    [] Abnormal -   Sclera  [x]  Normal    [] Abnormal -          Discharge [x]  None visible   [] Abnormal -     HENT: [x] Normocephalic, atraumatic  [] Abnormal - [x] Mouth/Throat: Mucous membranes are moist    External Ears [x] Normal  [] Abnormal -    Neck: [x] No visualized mass [] Abnormal -     Pulmonary/Chest: [x] Respiratory effort normal   [x] No visualized signs of difficulty breathing or respiratory distress        [] Abnormal -      Musculoskeletal:   [x] Normal gait with no signs of ataxia         [x] Normal range of motion of neck        [] Abnormal -     Neurological:        [x] No Facial Asymmetry (Cranial nerve 7 motor function) (limited exam due to video visit)          [x] No gaze palsy        [] Abnormal -          Skin:        [x] No significant exanthematous lesions or discoloration noted on facial skin         [] Abnormal -            Psychiatric:       [x] Normal Affect [] Abnormal -        [x] No Hallucinations    Other pertinent observable physical exam findings:-             Jamilah Hernandez, was evaluated through a synchronous (real-time) audio-video encounter. The patient (or guardian if applicable) is aware that this is a billable service, which includes applicable co-pays. This Virtual Visit was conducted with patient's (and/or legal guardian's) consent. The visit was conducted pursuant to the emergency declaration under the [address] [place] [address] [facility] authority and the [employer] and [employer] General Act. Patient identification was verified, and a caregiver was present when appropriate. The patient was located at Home: [address] ([facility] [address]  Provider was located at [facility] (Appt Dept): [address] [place] [address],[address] [facility]         --YANN Loco - CNP

## 2023-02-24 NOTE — TELEPHONE ENCOUNTER
Care Transitions Initial Follow Up Call    Call within 2 business days of discharge: Yes     Patient: Zach Lewis Patient : 1955 MRN: [id number]    [unfilled]    RARS: Readmission Risk Score: 13.7       Spoke with: patient     Discharge department/facility: home    Non-face-to-face services provided:  Scheduled appointment with PCP-today    Spoke to patient for hospital follow up. States that her [facility] RN is present now. Denies any needs.        Follow Up  Future Appointments   Date Time Provider Nahid Rick   3/2/2023 10:00 AM YANN Clark - [facility] [place] [facility]   3/22/2023 11:30 AM DO Wiliam Holm [facility]       Starr Arguelles RN

## 2023-02-24 NOTE — TELEPHONE ENCOUNTER
2nd Attempt; No Answer- Left HIPAA compliant voicemail with Non-Urgent Heart Failure Resource Line number for call back.      Raudel Cortez RN

## 2023-03-02 ENCOUNTER — OFFICE VISIT (OUTPATIENT)
Dept: FAMILY MEDICINE CLINIC | Age: 68
End: 2023-03-02

## 2023-03-02 VITALS
DIASTOLIC BLOOD PRESSURE: 74 MMHG | OXYGEN SATURATION: 94 % | HEART RATE: 120 BPM | WEIGHT: 176.2 LBS | HEIGHT: 61 IN | RESPIRATION RATE: 16 BRPM | BODY MASS INDEX: 33.27 KG/M2 | SYSTOLIC BLOOD PRESSURE: 112 MMHG

## 2023-03-02 DIAGNOSIS — E03.9 HYPOTHYROIDISM, UNSPECIFIED TYPE: Primary | ICD-10-CM

## 2023-03-02 DIAGNOSIS — Z09 HOSPITAL DISCHARGE FOLLOW-UP: ICD-10-CM

## 2023-03-02 DIAGNOSIS — I50.9 CONGESTIVE HEART FAILURE, UNSPECIFIED HF CHRONICITY, UNSPECIFIED HEART FAILURE TYPE (HCC): ICD-10-CM

## 2023-03-02 LAB
A/G RATIO: 1.4 (ref 1.1–2.2)
ALBUMIN SERPL-MCNC: 4 G/DL (ref 3.4–5)
ALP BLD-CCNC: 456 U/L (ref 40–129)
ALT SERPL-CCNC: 39 U/L (ref 10–40)
ANION GAP SERPL CALCULATED.3IONS-SCNC: 17 MMOL/L (ref 3–16)
AST SERPL-CCNC: 22 U/L (ref 15–37)
BILIRUB SERPL-MCNC: 0.9 MG/DL (ref 0–1)
BUN BLDV-MCNC: 51 MG/DL (ref 7–20)
CALCIUM SERPL-MCNC: 10.7 MG/DL (ref 8.3–10.6)
CHLORIDE BLD-SCNC: 98 MMOL/L (ref 99–110)
CO2: 24 MMOL/L (ref 21–32)
CREAT SERPL-MCNC: 0.9 MG/DL (ref 0.6–1.2)
GFR SERPL CREATININE-BSD FRML MDRD: >60 ML/MIN/{1.73_M2}
GLUCOSE BLD-MCNC: 93 MG/DL (ref 70–99)
HCT VFR BLD CALC: 41.8 % (ref 36–48)
HEMOGLOBIN: 13.3 G/DL (ref 12–16)
MCH RBC QN AUTO: 27.8 PG (ref 26–34)
MCHC RBC AUTO-ENTMCNC: 31.9 G/DL (ref 31–36)
MCV RBC AUTO: 87.2 FL (ref 80–100)
PDW BLD-RTO: 19.7 % (ref 12.4–15.4)
PLATELET # BLD: 186 K/UL (ref 135–450)
PMV BLD AUTO: 10.3 FL (ref 5–10.5)
POTASSIUM SERPL-SCNC: 4.6 MMOL/L (ref 3.5–5.1)
PRO-BNP: 132 PG/ML (ref 0–124)
RBC # BLD: 4.79 M/UL (ref 4–5.2)
SODIUM BLD-SCNC: 139 MMOL/L (ref 136–145)
TOTAL PROTEIN: 6.8 G/DL (ref 6.4–8.2)
TSH REFLEX: 1.19 UIU/ML (ref 0.27–4.2)
WBC # BLD: 28.4 K/UL (ref 4–11)

## 2023-03-02 NOTE — PROGRESS NOTES
Post-Discharge Transitional Care  Follow Up      Marky Soriano   YOB: 1955    Date of Office Visit:  3/2/2023  Date of Hospital Admission: 2/13/23  Date of Hospital Discharge: 2/22/23  Risk of hospital readmission (high >=14%. Medium >=10%) :Readmission Risk Score: 13.7      Care management risk score Rising risk (score 2-5) and Complex Care (Scores >=6): No Risk Score On File     Non face to face  following discharge, date last encounter closed (first attempt may have been earlier): 02/24/2023    Call initiated 2 business days of discharge: Yes    ASSESSMENT/PLAN:   Hypothyroidism, unspecified type  -     TSH with Reflex; Future  -Patient is originally from [place] it is unclear when she last had a thyroid level checked. Patient is not fairly unique dosing plan to check thyroid at this time. Congestive heart failure, unspecified HF chronicity, unspecified heart failure type ([facility] [address]  -     Comprehensive Metabolic Panel; Future  -     Brain Natriuretic Peptide; Future  -     CBC; Future  -Patient was ultimately said to have heart failure in hospital.  I do not believe that this is a continued and recurrent issue for patient we will plan to check labs listed above. Patient does still have some exertional dyspnea. This is hard to determine if this is from deconditioning or the lung mass. Ultimately we will plan to rule out any pulmonary edema, however physical exam in office was unremarkable. Hospital discharge follow-up  -     WI DISCHARGE MEDS RECONCILED W/ CURRENT OUTPATIENT MED LIST    Medical Decision Making: straightforward  No follow-ups on file. Subjective:   HPI:  Follow up of Hospital problems/diagnosis(es): Patient's current diagnosis that are currently being actively treated include mass of the left lower lung, metastatic liver cancer. Inpatient course: Discharge summary reviewed    -Patient was originally seen in office today for acute hypoxic respiratory failure.   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 Patient also was found to have bilateral pulmonary embolism. Patient was discharged ultimately on home oxygen with appropriate blood thinning medication. Patient was also given referrals to follow-up with cardiology and oncology. During her hospitalization. Patient did have some fluid overload was ultimately diuresed. Since discharge patient is doing okay. Patient does have exertional dyspnea. Patient remains on oxygen anywhere from 1-1/2 L to 3 L. Patient does have a follow-up appointment with oncology soon. We will plan to continue current medications at this time.    -Did evaluate patient's biopsy spot in office today removed dressing that was applied during hospitalization there was no signs of infection, no abnormal bruising noted to the area. Patient Active Problem List   Diagnosis    Acute respiratory failure (HCC)    Acute deep vein thrombosis (DVT) of proximal vein of left lower extremity (HCC)    Bilateral pulmonary embolism (HCC)    Elevated troponin    Deep venous thrombosis (HCC)    Pericardial effusion    Pleural effusion on left    Pneumonia due to COVID-19 virus    Mass of left lung    Mediastinal adenopathy    Hilar adenopathy    Metastasis to liver [facility])       Medications listed as ordered at the time of discharge from hospital     Medication List            Accurate as of March 2, 2023  1:06 PM. If you have any questions, ask your nurse or doctor.                 CONTINUE taking these medications      ALIGN PREBIOTIC-PROBIOTIC PO     apixaban 5 MG Tabs tablet  Commonly known as: ELIQUIS  Take 1 tablet by mouth 2 times daily     ascorbic acid 500 MG tablet  Commonly known as: VITAMIN C  Take 1 tablet by mouth daily     atorvastatin 40 MG tablet  Commonly known as: LIPITOR     Biotin 1000 MCG Tabs     CALCIUM LACTATE PO     CO-Q 10 OMEGA-3 FISH OIL PO     dexamethasone 4 MG tablet  Commonly known as: DECADRON  Take 1 tablet by mouth every 12 hours for 10 days     ICAPS AREDS 2 PO     L-Lysine 1000 MG Tabs     losartan-hydroCHLOROthiazide 100-12.5 MG per tablet  Commonly known as: HYZAAR     magnesium lactate 84 MG (7MEQ) Tbcr extended release tablet  Commonly known as: MAG-TAB CR     Melatonin 5 MG Chew     omeprazole 40 MG delayed release capsule  Commonly known as: PRILOSEC     * thyroid 60 MG tablet  Commonly known as: ARMOUR     * thyroid 30 MG tablet  Commonly known as: ARMOUR     traMADol 50 MG tablet  Commonly known as: Ultram  Take 1 tablet by mouth every 6 hours as needed for Pain for up to 14 days. Intended supply: 14 days. Take lowest dose possible to manage pain Max Daily Amount: 200 mg     TURMERIC PO     VITAMIN D PO           * This list has 2 medication(s) that are the same as other medications prescribed for you. Read the directions carefully, and ask your doctor or other care provider to review them with you. Medications marked \"taking\" at this time  Outpatient Medications Marked as Taking for the 3/2/23 encounter (Office Visit) with YANN Marie - CNP   Medication Sig Dispense Refill    traMADol (ULTRAM) 50 MG tablet Take 1 tablet by mouth every 6 hours as needed for Pain for up to 14 days. Intended supply: 14 days.  Take lowest dose possible to manage pain Max Daily Amount: 200 mg 28 tablet 0    apixaban (ELIQUIS) 5 MG TABS tablet Take 1 tablet by mouth 2 times daily 60 tablet 2    dexamethasone (DECADRON) 4 MG tablet Take 1 tablet by mouth every 12 hours for 10 days 20 tablet 0    ascorbic acid (VITAMIN C) 500 MG tablet Take 1 tablet by mouth daily 30 tablet 3    Coenzyme Q10-Fish Oil-Vit E (CO-Q 10 OMEGA-3 FISH OIL PO) Take by mouth daily      CALCIUM LACTATE PO Take 250 mg by mouth in the morning and at bedtime      Bacillus Coagulans-Inulin (ALIGN PREBIOTIC-PROBIOTIC PO) Take by mouth daily      TURMERIC PO Take 1,000 mg by mouth daily VITAMIN D PO Take 10,000 Int'l Units by mouth daily      Biotin 1000 MCG TABS Take 10,000 mcg by mouth nightly      magnesium lactate (MAG-TAB CR) 84 MG (7MEQ) TBCR extended release tablet Take by mouth nightly      Multiple Vitamins-Minerals (ICAPS AREDS 2 PO) Take 1 tablet by mouth in the morning and at bedtime      L-Lysine 1000 MG TABS Take by mouth daily      Melatonin 5 MG CHEW Take 5 mg by mouth nightly      atorvastatin (LIPITOR) 40 MG tablet atorvastatin 40 mg tablet      thyroid (ARMOUR) 30 MG tablet Take one tablet on Monday, Weednesday, and Friday      losartan-hydroCHLOROthiazide (HYZAAR) 100-12.5 MG per tablet Takes half in morning and half at night      omeprazole (PRILOSEC) 40 MG delayed release capsule omeprazole 40 mg capsule,delayed release      thyroid (ARMOUR) 60 MG tablet Take 60 mg by mouth daily 1 tablet on Sunday, Tuesday, Thursday and Saturday          Medications patient taking as of now reconciled against medications ordered at time of hospital discharge: Yes        Objective:    /74 (Site: Right Wrist, Position: Sitting)   Pulse (!) 120   Resp 16   Ht 5' 1\" (1.549 m)   Wt 176 lb 3.2 oz (79.9 kg)   SpO2 94%   BMI 33.29 kg/m²   Skin: warm and dry, no rash or erythema  Head: normocephalic and atraumatic  Eyes: pupils equal, round, and reactive to light, extraocular eye movements intact, conjunctivae normal  Pulmonary/Chest: clear to auscultation bilaterally- no wheezes, rales or rhonchi, normal air movement, no respiratory distress  Cardiovascular: normal rate, normal S1 and S2, no gallops, intact distal pulses, and no carotid bruits  Abdomen: soft, non-tender, non-distended, normal bowel sounds, no masses or organomegaly  Extremities: no cyanosis and no clubbing  Musculoskeletal: normal range of motion, no joint swelling, deformity or tenderness  Neurologic: gait and coordination normal and speech normal      An electronic signature was used to authenticate this note.  --Chago Calderon, YANN - CNP

## 2023-03-03 ENCOUNTER — HOSPITAL ENCOUNTER (OUTPATIENT)
Age: 68
Discharge: HOME | End: 2023-03-03
Payer: MEDICARE

## 2023-03-03 ENCOUNTER — HOSPITAL ENCOUNTER (OUTPATIENT)
Dept: GENERAL RADIOLOGY | Age: 68
Discharge: HOME | End: 2023-03-03
Payer: MEDICARE

## 2023-03-03 DIAGNOSIS — I50.9 CONGESTIVE HEART FAILURE, UNSPECIFIED HF CHRONICITY, UNSPECIFIED HEART FAILURE TYPE (HCC): ICD-10-CM

## 2023-03-03 PROCEDURE — 71046 X-RAY EXAM CHEST 2 VIEWS: CPT

## 2023-03-06 ENCOUNTER — APPOINTMENT (OUTPATIENT)
Dept: GENERAL RADIOLOGY | Age: 68
DRG: 871 | End: 2023-03-06
Payer: COMMERCIAL

## 2023-03-06 ENCOUNTER — HOSPITAL ENCOUNTER (INPATIENT)
Age: 68
LOS: 3 days | Discharge: HOSPICE | DRG: 871 | End: 2023-03-09
Attending: EMERGENCY MEDICINE | Admitting: INTERNAL MEDICINE
Payer: COMMERCIAL

## 2023-03-06 ENCOUNTER — APPOINTMENT (OUTPATIENT)
Dept: CT IMAGING | Age: 68
DRG: 871 | End: 2023-03-06
Payer: COMMERCIAL

## 2023-03-06 DIAGNOSIS — A41.9 SEPTIC SHOCK (HCC): Primary | ICD-10-CM

## 2023-03-06 DIAGNOSIS — J18.9 PNEUMONIA DUE TO INFECTIOUS ORGANISM, UNSPECIFIED LATERALITY, UNSPECIFIED PART OF LUNG: ICD-10-CM

## 2023-03-06 DIAGNOSIS — R09.02 HYPOXIA: ICD-10-CM

## 2023-03-06 DIAGNOSIS — J18.9 COMMUNITY ACQUIRED PNEUMONIA OF RIGHT LOWER LOBE OF LUNG: Primary | ICD-10-CM

## 2023-03-06 DIAGNOSIS — R65.21 SEPTIC SHOCK (HCC): Primary | ICD-10-CM

## 2023-03-06 DIAGNOSIS — J18.9 COMMUNITY ACQUIRED PNEUMONIA OF RIGHT LOWER LOBE OF LUNG: ICD-10-CM

## 2023-03-06 PROBLEM — R65.20 SEVERE SEPSIS (HCC): Status: ACTIVE | Noted: 2023-03-06

## 2023-03-06 LAB
A/G RATIO: 1.6 (ref 1.1–2.2)
ALBUMIN SERPL-MCNC: 3.6 G/DL (ref 3.4–5)
ALP BLD-CCNC: 377 U/L (ref 40–129)
ALT SERPL-CCNC: 29 U/L (ref 10–40)
ANION GAP SERPL CALCULATED.3IONS-SCNC: 17 MMOL/L (ref 3–16)
ANISOCYTOSIS: ABNORMAL
AST SERPL-CCNC: 35 U/L (ref 15–37)
ATYPICAL LYMPHOCYTE RELATIVE PERCENT: 4 % (ref 0–6)
BASE EXCESS VENOUS: -3 MMOL/L (ref -3–3)
BASOPHILS ABSOLUTE: 0 K/UL (ref 0–0.2)
BASOPHILS RELATIVE PERCENT: 0 %
BILIRUB SERPL-MCNC: 0.9 MG/DL (ref 0–1)
BUN BLDV-MCNC: 41 MG/DL (ref 7–20)
C-REACTIVE PROTEIN: 4.3 MG/L (ref 0–5.1)
CALCIUM SERPL-MCNC: 9.9 MG/DL (ref 8.3–10.6)
CARBOXYHEMOGLOBIN: 2.5 % (ref 0–1.5)
CHLORIDE BLD-SCNC: 98 MMOL/L (ref 99–110)
CO2: 20 MMOL/L (ref 21–32)
CREAT SERPL-MCNC: 0.6 MG/DL (ref 0.6–1.2)
EKG ATRIAL RATE: 120 BPM
EKG DIAGNOSIS: NORMAL
EKG P AXIS: 8 DEGREES
EKG P-R INTERVAL: 144 MS
EKG Q-T INTERVAL: 324 MS
EKG QRS DURATION: 76 MS
EKG QTC CALCULATION (BAZETT): 457 MS
EKG R AXIS: -5 DEGREES
EKG T AXIS: 77 DEGREES
EKG VENTRICULAR RATE: 120 BPM
EOSINOPHILS ABSOLUTE: 0 K/UL (ref 0–0.6)
EOSINOPHILS RELATIVE PERCENT: 0 %
GFR SERPL CREATININE-BSD FRML MDRD: >60 ML/MIN/{1.73_M2}
GLUCOSE BLD-MCNC: 145 MG/DL (ref 70–99)
HCO3 VENOUS: 19.9 MMOL/L (ref 23–29)
HCT VFR BLD CALC: 34.9 % (ref 36–48)
HEMOGLOBIN: 11.5 G/DL (ref 12–16)
INFLUENZA A: NOT DETECTED
INFLUENZA B: NOT DETECTED
LACTIC ACID, SEPSIS: 5.7 MMOL/L (ref 0.4–1.9)
LACTIC ACID: 4.6 MMOL/L (ref 0.4–2)
LACTIC ACID: 5.2 MMOL/L (ref 0.4–2)
LYMPHOCYTES ABSOLUTE: 1.6 K/UL (ref 1–5.1)
LYMPHOCYTES RELATIVE PERCENT: 2 %
MACROCYTES: ABNORMAL
MCH RBC QN AUTO: 28.4 PG (ref 26–34)
MCHC RBC AUTO-ENTMCNC: 32.8 G/DL (ref 31–36)
MCV RBC AUTO: 86.6 FL (ref 80–100)
METHEMOGLOBIN VENOUS: 0.4 %
MICROCYTES: ABNORMAL
MONOCYTES ABSOLUTE: 1.3 K/UL (ref 0–1.3)
MONOCYTES RELATIVE PERCENT: 5 %
NEUTROPHILS ABSOLUTE: 23.8 K/UL (ref 1.7–7.7)
NEUTROPHILS RELATIVE PERCENT: 89 %
O2 SAT, VEN: 97 %
O2 THERAPY: ABNORMAL
OVALOCYTES: ABNORMAL
PCO2, VEN: 29.3 MMHG (ref 40–50)
PDW BLD-RTO: 20 % (ref 12.4–15.4)
PH VENOUS: 7.45 (ref 7.35–7.45)
PLATELET # BLD: 115 K/UL (ref 135–450)
PLATELET SLIDE REVIEW: ABNORMAL
PMV BLD AUTO: 9.3 FL (ref 5–10.5)
PO2, VEN: 95.6 MMHG (ref 25–40)
POIKILOCYTES: ABNORMAL
POLYCHROMASIA: ABNORMAL
POTASSIUM REFLEX MAGNESIUM: 3.6 MMOL/L (ref 3.5–5.1)
PRO-BNP: 373 PG/ML (ref 0–124)
PROCALCITONIN: 0.06 NG/ML (ref 0–0.15)
RBC # BLD: 4.03 M/UL (ref 4–5.2)
SARS-COV-2 RNA, RT PCR: NOT DETECTED
SLIDE REVIEW: ABNORMAL
SODIUM BLD-SCNC: 135 MMOL/L (ref 136–145)
TCO2 CALC VENOUS: 21 MMOL/L
TEAR DROP CELLS: ABNORMAL
TOTAL PROTEIN: 5.8 G/DL (ref 6.4–8.2)
TROPONIN: 0.02 NG/ML
TROPONIN: 0.05 NG/ML
TROPONIN: 0.05 NG/ML
WBC # BLD: 26.7 K/UL (ref 4–11)

## 2023-03-06 PROCEDURE — 36415 COLL VENOUS BLD VENIPUNCTURE: CPT

## 2023-03-06 PROCEDURE — 85025 COMPLETE CBC W/AUTO DIFF WBC: CPT

## 2023-03-06 PROCEDURE — 84145 PROCALCITONIN (PCT): CPT

## 2023-03-06 PROCEDURE — 83880 ASSAY OF NATRIURETIC PEPTIDE: CPT

## 2023-03-06 PROCEDURE — 6360000002 HC RX W HCPCS: Performed by: INTERNAL MEDICINE

## 2023-03-06 PROCEDURE — 82803 BLOOD GASES ANY COMBINATION: CPT

## 2023-03-06 PROCEDURE — 2580000003 HC RX 258: Performed by: PHYSICIAN ASSISTANT

## 2023-03-06 PROCEDURE — 6360000002 HC RX W HCPCS: Performed by: PHYSICIAN ASSISTANT

## 2023-03-06 PROCEDURE — 6360000004 HC RX CONTRAST MEDICATION: Performed by: PHYSICIAN ASSISTANT

## 2023-03-06 PROCEDURE — 86140 C-REACTIVE PROTEIN: CPT

## 2023-03-06 PROCEDURE — 99223 1ST HOSP IP/OBS HIGH 75: CPT | Performed by: STUDENT IN AN ORGANIZED HEALTH CARE EDUCATION/TRAINING PROGRAM

## 2023-03-06 PROCEDURE — 2580000003 HC RX 258: Performed by: INTERNAL MEDICINE

## 2023-03-06 PROCEDURE — 87040 BLOOD CULTURE FOR BACTERIA: CPT

## 2023-03-06 PROCEDURE — 71045 X-RAY EXAM CHEST 1 VIEW: CPT

## 2023-03-06 PROCEDURE — 96374 THER/PROPH/DIAG INJ IV PUSH: CPT

## 2023-03-06 PROCEDURE — 83605 ASSAY OF LACTIC ACID: CPT

## 2023-03-06 PROCEDURE — 93005 ELECTROCARDIOGRAM TRACING: CPT | Performed by: PHYSICIAN ASSISTANT

## 2023-03-06 PROCEDURE — 80053 COMPREHEN METABOLIC PANEL: CPT

## 2023-03-06 PROCEDURE — 87636 SARSCOV2 & INF A&B AMP PRB: CPT

## 2023-03-06 PROCEDURE — 2000000000 HC ICU R&B

## 2023-03-06 PROCEDURE — 84484 ASSAY OF TROPONIN QUANT: CPT

## 2023-03-06 PROCEDURE — 99285 EMERGENCY DEPT VISIT HI MDM: CPT

## 2023-03-06 PROCEDURE — 71260 CT THORAX DX C+: CPT | Performed by: PHYSICIAN ASSISTANT

## 2023-03-06 PROCEDURE — 93010 ELECTROCARDIOGRAM REPORT: CPT | Performed by: INTERNAL MEDICINE

## 2023-03-06 PROCEDURE — 6370000000 HC RX 637 (ALT 250 FOR IP): Performed by: INTERNAL MEDICINE

## 2023-03-06 RX ORDER — ACETAMINOPHEN 325 MG/1
650 TABLET ORAL EVERY 6 HOURS PRN
Status: DISCONTINUED | OUTPATIENT
Start: 2023-03-06 | End: 2023-03-09 | Stop reason: HOSPADM

## 2023-03-06 RX ORDER — LEVOFLOXACIN 5 MG/ML
750 INJECTION, SOLUTION INTRAVENOUS EVERY 24 HOURS
Status: DISCONTINUED | OUTPATIENT
Start: 2023-03-07 | End: 2023-03-09 | Stop reason: HOSPADM

## 2023-03-06 RX ORDER — 0.9 % SODIUM CHLORIDE 0.9 %
434 INTRAVENOUS SOLUTION INTRAVENOUS ONCE
Status: DISCONTINUED | OUTPATIENT
Start: 2023-03-06 | End: 2023-03-09 | Stop reason: HOSPADM

## 2023-03-06 RX ORDER — SODIUM CHLORIDE 9 MG/ML
INJECTION, SOLUTION INTRAVENOUS CONTINUOUS
Status: DISCONTINUED | OUTPATIENT
Start: 2023-03-06 | End: 2023-03-07

## 2023-03-06 RX ORDER — ATORVASTATIN CALCIUM 40 MG/1
40 TABLET, FILM COATED ORAL NIGHTLY
Status: DISCONTINUED | OUTPATIENT
Start: 2023-03-06 | End: 2023-03-09 | Stop reason: HOSPADM

## 2023-03-06 RX ORDER — DOXYCYCLINE HYCLATE 100 MG
100 TABLET ORAL 2 TIMES DAILY
Qty: 28 TABLET | Refills: 0 | Status: ON HOLD | OUTPATIENT
Start: 2023-03-06 | End: 2023-03-09 | Stop reason: SDUPTHER

## 2023-03-06 RX ORDER — 0.9 % SODIUM CHLORIDE 0.9 %
1000 INTRAVENOUS SOLUTION INTRAVENOUS ONCE
Status: COMPLETED | OUTPATIENT
Start: 2023-03-06 | End: 2023-03-06

## 2023-03-06 RX ORDER — SODIUM CHLORIDE 9 MG/ML
INJECTION, SOLUTION INTRAVENOUS PRN
Status: DISCONTINUED | OUTPATIENT
Start: 2023-03-06 | End: 2023-03-09 | Stop reason: HOSPADM

## 2023-03-06 RX ORDER — SODIUM CHLORIDE 0.9 % (FLUSH) 0.9 %
5-40 SYRINGE (ML) INJECTION EVERY 12 HOURS SCHEDULED
Status: DISCONTINUED | OUTPATIENT
Start: 2023-03-06 | End: 2023-03-09 | Stop reason: HOSPADM

## 2023-03-06 RX ORDER — ONDANSETRON 2 MG/ML
4 INJECTION INTRAMUSCULAR; INTRAVENOUS EVERY 6 HOURS PRN
Status: DISCONTINUED | OUTPATIENT
Start: 2023-03-06 | End: 2023-03-09 | Stop reason: HOSPADM

## 2023-03-06 RX ORDER — ENOXAPARIN SODIUM 100 MG/ML
40 INJECTION SUBCUTANEOUS DAILY
Status: DISCONTINUED | OUTPATIENT
Start: 2023-03-06 | End: 2023-03-06 | Stop reason: ALTCHOICE

## 2023-03-06 RX ORDER — LEVOFLOXACIN 500 MG/1
TABLET, FILM COATED ORAL
Status: ON HOLD | COMMUNITY
Start: 2023-03-05 | End: 2023-03-09 | Stop reason: SDUPTHER

## 2023-03-06 RX ORDER — ONDANSETRON 4 MG/1
4 TABLET, ORALLY DISINTEGRATING ORAL EVERY 8 HOURS PRN
Status: DISCONTINUED | OUTPATIENT
Start: 2023-03-06 | End: 2023-03-09 | Stop reason: HOSPADM

## 2023-03-06 RX ORDER — LEVOFLOXACIN 5 MG/ML
750 INJECTION, SOLUTION INTRAVENOUS ONCE
Status: COMPLETED | OUTPATIENT
Start: 2023-03-06 | End: 2023-03-06

## 2023-03-06 RX ORDER — ACETAMINOPHEN 650 MG/1
650 SUPPOSITORY RECTAL EVERY 6 HOURS PRN
Status: DISCONTINUED | OUTPATIENT
Start: 2023-03-06 | End: 2023-03-09 | Stop reason: HOSPADM

## 2023-03-06 RX ORDER — POLYETHYLENE GLYCOL 3350 17 G/17G
17 POWDER, FOR SOLUTION ORAL DAILY PRN
Status: DISCONTINUED | OUTPATIENT
Start: 2023-03-06 | End: 2023-03-09 | Stop reason: HOSPADM

## 2023-03-06 RX ORDER — PANTOPRAZOLE SODIUM 40 MG/1
40 TABLET, DELAYED RELEASE ORAL
Status: DISCONTINUED | OUTPATIENT
Start: 2023-03-07 | End: 2023-03-09

## 2023-03-06 RX ORDER — TRAMADOL HYDROCHLORIDE 50 MG/1
50 TABLET ORAL EVERY 6 HOURS PRN
Status: DISCONTINUED | OUTPATIENT
Start: 2023-03-06 | End: 2023-03-09 | Stop reason: HOSPADM

## 2023-03-06 RX ORDER — SODIUM CHLORIDE 0.9 % (FLUSH) 0.9 %
5-40 SYRINGE (ML) INJECTION PRN
Status: DISCONTINUED | OUTPATIENT
Start: 2023-03-06 | End: 2023-03-09 | Stop reason: HOSPADM

## 2023-03-06 RX ORDER — 0.9 % SODIUM CHLORIDE 0.9 %
500 INTRAVENOUS SOLUTION INTRAVENOUS ONCE
Status: DISCONTINUED | OUTPATIENT
Start: 2023-03-06 | End: 2023-03-09 | Stop reason: HOSPADM

## 2023-03-06 RX ADMIN — ATORVASTATIN CALCIUM 40 MG: 40 TABLET, FILM COATED ORAL at 21:00

## 2023-03-06 RX ADMIN — LEVOFLOXACIN 750 MG: 5 INJECTION, SOLUTION INTRAVENOUS at 15:06

## 2023-03-06 RX ADMIN — Medication 10 ML: at 21:00

## 2023-03-06 RX ADMIN — ONDANSETRON 4 MG: 2 INJECTION INTRAMUSCULAR; INTRAVENOUS at 21:06

## 2023-03-06 RX ADMIN — APIXABAN 5 MG: 5 TABLET, FILM COATED ORAL at 21:00

## 2023-03-06 RX ADMIN — VANCOMYCIN HYDROCHLORIDE 1250 MG: 10 INJECTION, POWDER, LYOPHILIZED, FOR SOLUTION INTRAVENOUS at 18:52

## 2023-03-06 RX ADMIN — SODIUM CHLORIDE 1000 ML: 9 INJECTION, SOLUTION INTRAVENOUS at 13:17

## 2023-03-06 RX ADMIN — AZITHROMYCIN MONOHYDRATE 500 MG: 500 INJECTION, POWDER, LYOPHILIZED, FOR SOLUTION INTRAVENOUS at 17:33

## 2023-03-06 RX ADMIN — IOPAMIDOL 75 ML: 755 INJECTION, SOLUTION INTRAVENOUS at 14:40

## 2023-03-06 RX ADMIN — Medication 500 ML: at 21:30

## 2023-03-06 RX ADMIN — SODIUM CHLORIDE: 9 INJECTION, SOLUTION INTRAVENOUS at 17:31

## 2023-03-06 ASSESSMENT — ENCOUNTER SYMPTOMS
COUGH: 1
ABDOMINAL PAIN: 0
EYE DISCHARGE: 0
EYE REDNESS: 0
CONSTIPATION: 0
SHORTNESS OF BREATH: 1
EYE PAIN: 0
SORE THROAT: 0
STRIDOR: 0
BACK PAIN: 0
NAUSEA: 0
EYE ITCHING: 0
COLOR CHANGE: 0
TROUBLE SWALLOWING: 0
WHEEZING: 0
DIARRHEA: 0
VOMITING: 0
ABDOMINAL DISTENTION: 0

## 2023-03-06 ASSESSMENT — PAIN - FUNCTIONAL ASSESSMENT: PAIN_FUNCTIONAL_ASSESSMENT: NONE - DENIES PAIN

## 2023-03-06 NOTE — ACP (ADVANCE CARE PLANNING)
Advance Care Planning     General Advance Care Planning (ACP) Conversation    Date of Conversation: 3/6/2023  Conducted with: Patient with Decision Making Capacity    Healthcare Decision Maker:    Primary Decision Maker: Angie Cancino - [facility] - [phone]  Click here to complete Healthcare Decision Makers including selection of the Healthcare Decision Maker Relationship (ie \"Primary\"). Today we documented Decision Maker(s) consistent with Legal Next of Kin hierarchy.     Content/Action Overview:  Has NO ACP documents/care preferences - information provided, considering goals and options          Length of Voluntary ACP Conversation in minutes:  <16 minutes (Non-Billable)    HOLLY Linares

## 2023-03-06 NOTE — ED PROVIDER NOTES
[address] [facility]  ED  EMERGENCY DEPARTMENT ENCOUNTER        Pt Name: Karine Garces  MRN: [id number]  [place] 1955  Date of evaluation: 3/6/2023  Provider: Jd Browning PA-C  PCP: YANN Molina CNP  Note Started: 1:46 PM EST 3/6/23       I have seen and evaluated this patient with my supervising physician Coral Cano DO. I personally saw the patient and independently provided 33 minutes of non-concurrent critical care time out of the total critical care time provided. This excludes time spent doing separately billable procedures. This includes time at the bedside, data interpretation, medication management, obtaining critical history from collateral sources if the patient is unable to provide it directly, and physician consultation. Specifics of interventions taken and potentially life-threatening diagnostic considerations are listed above in the medical decision making. CHIEF COMPLAINT       Chief Complaint   Patient presents with    Shortness of Breath     Pt has a new cancer diagnosis (February 2023) was discharged on 2/22/23 wearing home oxygen. Had a chest x ray on Friday where pt was dx with pneumonia. Pt feeling weak and SOB. She has not started any therapy for her cancer yet. HISTORY OF PRESENT ILLNESS: 1 or more Elements     History From: patient  Limitations to history : None    Karine Garces is a 79 y.o. female who presents to the emergency department with a chief complaint of hemoptysis and increased shortness of breath that began yesterday and worsened today. She was recently seen here and diagnosed with pulmonary embolus with lung cancer. She has not started her treatment for her lung cancer yet. She had some leftover Zithromax 250 mg that she took yesterday due to her worsening shortness of breath with hemoptysis and cough for the past 2 days. X-ray imaging performed 3 days ago that revealed CHF versus pneumonia.   She was started on Levaquin and took her first dose today. She was recently sent home with nasal cannula oxygen and has been able to decrease down to about 1-1/2 L a day. She is requiring 6 L at this time maintaining oxygen saturation of 88 to 90%. Nursing Notes were all reviewed and agreed with or any disagreements were addressed in the HPI. REVIEW OF SYSTEMS :      Review of Systems    Positives and Pertinent negatives as per HPI.      SURGICAL HISTORY     Past Surgical History:   Procedure Laterality Date    CT BIOPSY PERCUTANEOUS DEEP BONE  2/20/2023    CT BIOPSY PERCUTANEOUS DEEP BONE 2/20/2023 Roland Allan MD [place] CT SCAN       CURRENTMEDICATIONS       Previous Medications    APIXABAN (ELIQUIS) 5 MG TABS TABLET    Take 1 tablet by mouth 2 times daily    ASCORBIC ACID (VITAMIN C) 500 MG TABLET    Take 1 tablet by mouth daily    ATORVASTATIN (LIPITOR) 40 MG TABLET    atorvastatin 40 mg tablet    BACILLUS COAGULANS-INULIN (ALIGN PREBIOTIC-PROBIOTIC PO)    Take by mouth daily    BIOTIN 1000 MCG TABS    Take 10,000 mcg by mouth nightly    CALCIUM LACTATE PO    Take 250 mg by mouth in the morning and at bedtime    COENZYME Q10-FISH OIL-VIT E (CO-Q 10 OMEGA-3 FISH OIL PO)    Take by mouth daily    DOXYCYCLINE HYCLATE (VIBRA-TABS) 100 MG TABLET    Take 1 tablet by mouth 2 times daily for 14 days    L-LYSINE 1000 MG TABS    Take by mouth daily    LEVOFLOXACIN (LEVAQUIN) 500 MG TABLET    TAKE 1 TABLET BY MOUTH ONCE DAILY    LOSARTAN-HYDROCHLOROTHIAZIDE (HYZAAR) 100-12.5 MG PER TABLET    Takes half in morning and half at night    MAGNESIUM LACTATE (MAG-TAB CR) 84 MG (7MEQ) TBCR EXTENDED RELEASE TABLET    Take by mouth nightly    MELATONIN 5 MG CHEW    Take 5 mg by mouth nightly    MULTIPLE VITAMINS-MINERALS (ICAPS AREDS 2 PO)    Take 1 tablet by mouth in the morning and at bedtime    OMEPRAZOLE (PRILOSEC) 40 MG DELAYED RELEASE CAPSULE    omeprazole 40 mg capsule,delayed release    THYROID (ARMOUR) 30 MG TABLET    Take one tablet on Monday, Weednesday, and Friday    THYROID (ARMOUR) 60 MG TABLET    Take 60 mg by mouth daily 1 tablet on Sunday, Tuesday, Thursday and Saturday    TRAMADOL (ULTRAM) 50 MG TABLET    Take 1 tablet by mouth every 6 hours as needed for Pain for up to 14 days. Intended supply: 14 days. Take lowest dose possible to manage pain Max Daily Amount: 200 mg    TURMERIC PO    Take 1,000 mg by mouth daily    VITAMIN D PO    Take 10,000 Int'l Units by mouth daily       ALLERGIES     Hydrocodone, Propoxyphene, and Penicillin g    FAMILYHISTORY       Family History   Problem Relation Age of Onset    Cancer Paternal Grandmother     Cancer Paternal Grandfather         SOCIAL HISTORY       Social History     Tobacco Use    Smoking status: Never    Smokeless tobacco: Never   Vaping Use    Vaping Use: Never used   Substance Use Topics    Alcohol use: Not Currently    Drug use: Never       SCREENINGS                         CIWA Assessment  BP: 107/74  Heart Rate: (!) 117           PHYSICAL EXAM  1 or more Elements     ED Triage Vitals   BP Temp Temp Source Heart Rate Resp SpO2 Height Weight   03/06/23 1258 03/06/23 1258 03/06/23 1258 03/06/23 1253 03/06/23 1253 03/06/23 1253 03/06/23 1253 03/06/23 1253   (!) 92/44 98.4 °F (36.9 °C) Oral (!) 124 22 (!) 86 % 5' 1\" (1.549 m) 176 lb 3.2 oz (79.9 kg)       Physical Exam  Vitals and nursing note reviewed. Constitutional:       Appearance: She is well-developed. She is ill-appearing. She is not diaphoretic. HENT:      Head: Atraumatic. Nose: Nose normal.   Eyes:      General:         Right eye: No discharge. Left eye: No discharge. Cardiovascular:      Rate and Rhythm: Regular rhythm. Tachycardia present. Heart sounds: No murmur heard. No friction rub. No gallop. Pulmonary:      Effort: Pulmonary effort is normal. No respiratory distress. Breath sounds: No stridor. No wheezing, rhonchi or rales. Comments: No retractions, accessory muscle use.   No obvious crackles or wheezing appreciated on exam.  Abdominal:      General: Bowel sounds are normal. There is no distension. Palpations: Abdomen is soft. There is no mass. Tenderness: There is no abdominal tenderness. There is no guarding or rebound. Hernia: No hernia is present. Musculoskeletal:         General: No swelling. Normal range of motion. Cervical back: Normal range of motion. Right lower leg: No edema. Left lower leg: No edema. Skin:     General: Skin is warm and dry. Findings: No erythema or rash. Neurological:      Mental Status: She is alert and oriented to person, place, and time. Cranial Nerves: No cranial nerve deficit.    Psychiatric:         Behavior: Behavior normal.           DIAGNOSTIC RESULTS   LABS:    Labs Reviewed   CBC WITH AUTO DIFFERENTIAL - Abnormal; Notable for the following components:       Result Value    WBC 26.7 (*)     Hemoglobin 11.5 (*)     Hematocrit 34.9 (*)     RDW 20.0 (*)     Platelets 492 (*)     Neutrophils Absolute 23.8 (*)     Anisocytosis Occasional (*)     Macrocytes Occasional (*)     Microcytes Occasional (*)     Polychromasia Occasional (*)     Poikilocytes Occasional (*)     Ovalocytes Occasional (*)     Tear Drop Cells Occasional (*)     All other components within normal limits   COMPREHENSIVE METABOLIC PANEL W/ REFLEX TO MG FOR LOW K - Abnormal; Notable for the following components:    Sodium 135 (*)     Chloride 98 (*)     CO2 20 (*)     Anion Gap 17 (*)     Glucose 145 (*)     BUN 41 (*)     Total Protein 5.8 (*)     Alkaline Phosphatase 377 (*)     All other components within normal limits   LACTATE, SEPSIS - Abnormal; Notable for the following components:    Lactic Acid, Sepsis 5.7 (*)     All other components within normal limits    Narrative:     [facility] tel. [phone],  Chemistry results called to and read back by Velasquez Barroso RN, 03/06/2023  13:48, by Oumou Bloom   TROPONIN - Abnormal; Notable for the following components:    Troponin 0.05 (*)     All other components within normal limits   BRAIN NATRIURETIC PEPTIDE - Abnormal; Notable for the following components:    Pro- (*)     All other components within normal limits   BLOOD GAS, VENOUS - Abnormal; Notable for the following components:    pCO2, Nader 29.3 (*)     pO2, Nader 95.6 (*)     HCO3, Venous 19.9 (*)     Carboxyhemoglobin 2.5 (*)     All other components within normal limits   COVID-19 & INFLUENZA COMBO   CULTURE, BLOOD 1   CULTURE, BLOOD 2   PROCALCITONIN   C-REACTIVE PROTEIN   LACTATE, SEPSIS       When ordered only abnormal lab results are displayed. All other labs were within normal range or not returned as of this dictation. EKG: When ordered, EKG's are interpreted by the Emergency Department Physician in the absence of a cardiologist.  Please see their note for interpretation of EKG. RADIOLOGY:   Non-plain film images such as CT, Ultrasound and MRI are read by the radiologist. Plain radiographic images are visualized and preliminarily interpreted by the ED Provider with the below findings:    Chest x-ray reviewed by myself reveals some diffuse airspace disease bilaterally. Interpretation per the Radiologist below, if available at the time of this note:    CT CHEST PULMONARY EMBOLISM W CONTRAST   Final Result   1. Acute bilateral pulmonary emboli seen on prior CT have resolved. 2. Diffuse ground-glass opacity throughout both lungs has increased and could   be due to atypical pneumonia. 3. Mass in the posterior left upper lobe extending into the superior left   lower lobe has not significantly changed and is consistent with known   malignancy. 4. Mediastinal and hilar lymphadenopathy has not significantly changed from   prior CT. 5. Diffuse osseous metastatic disease. 6. Partially visualized hepatic metastatic disease. XR CHEST PORTABLE   Final Result   1.   Interval worsening of diffuse airspace opacities which could represent   edema or infection. 2.  Prominence of the left superior pulmonary hilum shown to represent a   probable carcinoma on the comparison CT chest from 13 February 2023           XR CHEST (2 VW)    Result Date: 3/3/2023  EXAMINATION: TWO XRAY VIEWS OF THE CHEST 3/3/2023 2:57 pm COMPARISON: 02/16/2023 HISTORY: ORDERING SYSTEM PROVIDED HISTORY: Congestive heart failure, unspecified HF chronicity, unspecified heart failure type [facility]) TECHNOLOGIST PROVIDED HISTORY: Reason for Exam: hx of covid, pna, and blood clots, sob for 2 months, been on 1.5 liters of oxygen when resting and 3  liters when walking, since Feb 13th FINDINGS: Cardiomediastinal silhouette stable. Bilateral pulmonary opacities. No pneumothorax. No pleural effusion. Bilateral pulmonary opacities similar to previous exam could represent edema or infection     XR CHEST PORTABLE    Result Date: 3/6/2023  EXAMINATION: ONE XRAY VIEW OF THE CHEST 3/6/2023 1:17 pm COMPARISON: Chest x-ray dated 3 March 2023 HISTORY: ORDERING SYSTEM PROVIDED HISTORY: SOB TECHNOLOGIST PROVIDED HISTORY: Reason for exam:->SOB Reason for Exam: Shortness of Breath FINDINGS: Interval worsening of diffuse bilateral airspace disease. No pneumothorax or pleural effusion. Prominence of the left superior hilum is stable. 1.  Interval worsening of diffuse airspace opacities which could represent edema or infection. 2.  Prominence of the left superior pulmonary hilum shown to represent a probable carcinoma on the comparison CT chest from 13 February 2023       No results found. PROCEDURES   Unless otherwise noted below, none     Procedures    CRITICAL CARE TIME (.cctime)       PAST MEDICAL HISTORY      has a past medical history of Asthma and Cancer ([facility] [address]      EMERGENCY DEPARTMENT COURSE and DIFFERENTIAL DIAGNOSIS/MDM:   Vitals:    Vitals:    03/06/23 1456 03/06/23 1529 03/06/23 1543 03/06/23 1602   BP:       Pulse: (!) 105 (!) 110 (!) 108 (!) 117   Resp: 21 18 19 17 Temp:       TempSrc:       SpO2: 92% 91% 93% 95%   Weight:       Height:           Patient was given the following medications:  Medications   azithromycin (ZITHROMAX) 500 mg in 250 mL addavial (has no administration in time range)   levoFLOXacin (LEVAQUIN) 750 MG/150ML infusion 750 mg (750 mg IntraVENous New Bag 3/6/23 1506)   0.9 % sodium chloride IV bolus 434 mL (has no administration in time range)   0.9 % sodium chloride bolus (1,000 mLs IntraVENous New Bag 3/6/23 1317)   iopamidol (ISOVUE-370) 76 % injection 75 mL (75 mLs IntraVENous Given 3/6/23 1440)             Is this patient to be included in the SEP-1 Core Measure due to severe sepsis or septic shock? Yes   SEP-1 CORE MEASURE DATA      Sepsis Criteria   Severe Sepsis Criteria   Septic Shock Criteria     Must be confirmed or suspected to move forward with diagnosis of sepsis. Must meet 2:    [] Temperature > 100.9 F (38.3 C)        or < 96.8 F (36 C)  [x] HR > 90  [] RR > 20  [x] WBC > 12 or < 4 or 10% bands      AND:      [x] Infection Confirmed or        Suspected. Must meet 1:    [] Lactate > 2       or   [] Signs of Organ Dysfunction:    - SBP < 90 or MAP < 65  - Altered mental status  - Creatinine > 2 or increased from      baseline  - Urine Output < 0.5 ml/kg/hr  - Bilirubin > 2  - INR > 1.5 (not anticoagulated)  - Platelets < 381,236  - Acute Respiratory Failure as     evidenced by new need for NIPPV     or mechanical ventilation      [] No criteria met for Severe Sepsis. Must meet 1:    [x] Lactate > 4        or   [] SBP < 90 or MAP < 65 for at        least two readings in the first        hour after fluid bolus        administration      [] Vasopressors initiated (if hypotension persists after fluid resuscitation)        [] No criteria met for Septic Shock.    Patient Vitals for the past 6 hrs:   BP Temp Pulse Resp SpO2 Height Weight Percent Weight Change   03/06/23 1253 -- -- (!) 124 22 (!) 86 % 5' 1\" (1.549 m) 176 lb 3.2 oz (79.9 kg) 0   03/06/23 1258 (!) 92/44 98.4 °F (36.9 °C) (!) 124 24 (!) 87 % -- -- --   03/06/23 1314 -- -- (!) 122 19 (!) 89 % -- -- --   03/06/23 1315 -- -- -- -- 90 % -- -- --   03/06/23 1318 94/63 -- (!) 119 19 91 % -- -- --   03/06/23 1322 -- -- (!) 120 19 92 % -- -- --   03/06/23 1326 96/61 -- (!) 123 15 -- -- -- --   03/06/23 1329 -- -- (!) 120 18 94 % -- -- --   03/06/23 1343 -- -- (!) 118 20 94 % -- -- --   03/06/23 1347 -- -- (!) 119 19 94 % -- -- --   03/06/23 1350 99/68 -- (!) 120 14 94 % -- -- --   03/06/23 1357 104/69 -- (!) 118 17 94 % -- -- --   03/06/23 1418 -- -- (!) 113 18 95 % -- -- --   03/06/23 1427 107/74 -- (!) 118 20 96 % -- -- --   03/06/23 1429 -- -- (!) 117 20 94 % -- -- --   03/06/23 1453 -- -- (!) 105 19 92 % -- -- --   03/06/23 1456 -- -- (!) 105 21 92 % -- -- --   03/06/23 1529 -- -- (!) 110 18 91 % -- -- --   03/06/23 1543 -- -- (!) 108 19 93 % -- -- --   03/06/23 1602 -- -- (!) 117 17 95 % -- -- --      Recent Labs     03/06/23  1310   WBC 26.7*   CREATININE 0.6   BILITOT 0.9   *         Time Septic Shock Identified: 1515    Fluid Resuscitation Rational: at least 30mL/kg based on ideal body weight due to obesity defined as BMI >30 (patient's BMI is Body mass index is 33.29 kg/m². and IBW is Ideal body weight: 47.8 kg (105 lb 6.1 oz)Adjusted ideal body weight: 60.6 kg (133 lb 11.3 oz))      Repeat lactate level: ordered and pending at this time    Reassessment Exam:   I have reassessed tissue perfusion and hemodynamic status after fluid bolus at this time: Heart rate mildly tachycardic without murmur rub or gallop. Mild crackles appreciated at the right lung base. No retractions or accessory muscle use. Chronic Conditions affecting care:    has a past medical history of Asthma and Cancer ([facility] [address]     CONSULTS: (Who and What was discussed)  IP CONSULT TO HOSPITALIST  IP CONSULT TO PULMONOLOGY      Social Determinants Significantly Affecting Health : None    Records Reviewed (External and Source) reviewing records from her previous hospital stay from internal medicine discharge summary patient was DC'd home on 4 L of nasal cannula oxygen related to acute hypoxic respiratory failure multifactorial due to pulmonary embolus, pneumothorax and concern for lung mass. She had echo performed that revealed an EF of 25% with some systolic function that was decreased. CC/HPI Summary, DDx, ED Course, and Reassessment: Patient presented with worsening shortness of breath and presented hypotensive, tachycardic and hypoxic on her home O2's. On 8 L of humidified oxygen here she is satting in the low 90s now. She was initially in the 80s. Her blood pressure has improved with fluid hydration. Initially just started with 1 L of normal saline given her questionable history of congestive heart failure with reduced systolic function on recent echocardiogram on last admission. She states that her leg swelling from her previous admission has improved however. She was found to have a leukocytosis of 26,000 with lactic acid of 5.7 meeting septic shock criteria. She was immediately when she got her started on IV Zithromax and Levaquin for presumed pneumonia. Further ideal sepsis fluids were performed after CT imaging revealed no obvious evidence of pulmonary edema and has a proBNP only of 373. Low suspicion for acute pulmonary edema or fluid overload. She remains stable in the emergency department. Troponin is still slightly elevated at 0.05 but this is actually lower than her previous admission. She is negative for COVID and influenza. CRP and procalcitonin are unremarkable. Discussed with hospitalist Dr. Radha Rothman who will admit patient for further work-up and treatment of her respiratory failure with hypoxia secondary to septic shock and pneumonia. Her blood pressure is improving just with fluid hydration with most recent blood pressure at time of admission of 107/74.     Disposition Considerations (tests considered but not done, Admit vs D/C, Shared Decision Making, Pt Expectation of Test or Tx.):        I am the Primary Clinician of Record. FINAL IMPRESSION      1. Septic shock ([address]    2. Hypoxia    3. Pneumonia due to infectious organism, unspecified laterality, unspecified part of lung          DISPOSITION/PLAN     DISPOSITION Admitted 03/06/2023 04:13:18 PM      PATIENT REFERRED TO:  No follow-up provider specified.     DISCHARGE MEDICATIONS:  New Prescriptions    No medications on file       DISCONTINUED MEDICATIONS:  Discontinued Medications    No medications on file              (Please note that portions of this note were completed with a voice recognition program.  Efforts were made to edit the dictations but occasionally words are mis-transcribed.)    Mustapha Cassidy PA-C (electronically signed)        Mustapha Cassidy PA-C  03/06/23 [id number]

## 2023-03-06 NOTE — CARE COORDINATION
Case Management Assessment  Initial Evaluation    Date/Time of Evaluation: 3/6/2023 4:34 PM  Assessment Completed by: HOLLY Sanchez    If patient is discharged prior to next notation, then this note serves as note for discharge by case management.    Patient Name: Patricia Cat                   YOB: 1955  Diagnosis: Severe sepsis (HCC) [A41.9, R65.20]                   Date / Time: 3/6/2023 12:47 PM    Patient Admission Status: Inpatient   Readmission Risk (Low < 19, Mod (19-27), High > 27): Readmission Risk Score: 13.7    Current PCP: YANN Melvin CNP  PCP verified by CM? (P) Yes    Chart Reviewed: Yes      History Provided by: (P) Patient  Patient Orientation: (P) Alert and Oriented    Patient Cognition: (P) Alert    Hospitalization in the last 30 days (Readmission):  Yes    If yes, Readmission Assessment in CM Navigator will be completed.    Advance Directives:      Code Status: Prior   Patient's Primary Decision Maker is: (P) Legal Next of Kin    Primary Decision Maker: Amadeo Cat - Spouse - [phone]    Discharge Planning:    Patient lives with: (P) Spouse/Significant Other Type of Home: (P) House  Primary Care Giver: (P) Self  Patient Support Systems include: (P) Spouse/Significant Other, Children, Family Members, [employer]   Current Financial resources: (P) None  Current community resources: (P) ECF/Home Care  Current services prior to admission: (P) Durable Medical Equipment, Oxygen Therapy            Current DME: (P) Oxygen Therapy (Comment) ([facility])            Type of Home Care services:  (P) OT, PT    ADLS  Prior functional level: (P) Independent in ADLs/IADLs  Current functional level: (P) Independent in ADLs/IADLs    PT AM-PAC:   /24  OT AM-PAC:   /24    Family can provide assistance at DC: (P) Yes  Would you like Case Management to discuss the discharge plan with any other family members/significant others, and if so, who? (P) No  Plans to Return to  Present Housing: (P) Yes  Other Identified Issues/Barriers to RETURNING to current housing: none noted  Potential Assistance needed at discharge: (P) Home Care            Potential DME:    Patient expects to discharge to: (P) [facility] for transportation at discharge:      Financial    Payor: [address] / Plan: MEDICARE PART A / Product Type: *No Product type* /     Does insurance require precert for SNF: No    Potential assistance Purchasing Medications: (P) No  Meds-to-Beds request:        [address] [place], [place] - [facility] [phone] Emma Goins [phone] [address] [address]  Phone: [phone] Fax: [phone]      Notes:    Factors facilitating achievement of predicted outcomes: Family support, Motivated, Cooperative, and Pleasant    Barriers to discharge: Medical complications    Additional Case Management Notes:   Referred to patient for d/c planning. Spoke to patient. Patient is a 79year old female admitted for pneumonia. Patient usually lives at home with  in [place]. Patient with new cancer dx. And family requested she come here until treatment plan arranged. Patient reports she is independent in ADLs. Patient is current with [facility] for home oxygen. Patient had [address] for one visit after last hospitalization. Patient currently denies d/c needs. The Plan for Transition of Care is related to the following treatment goals of Severe sepsis ([facility] [address] [A41.9, N54.06]    IF APPLICABLE: The Patient and/or patient representative Patt Moore and her family were provided with a choice of provider and agrees with the discharge plan. Freedom of choice list with basic dialogue that supports the patient's individualized plan of care/goals and shares the quality data associated with the providers was provided to:     Patient Representative Name:       The Patient and/or Patient Representative Agree with the Discharge Plan?       Candance Cornell [address], [place]  Case Management Department  Ph: [phone] Fax: [phone]

## 2023-03-06 NOTE — CONSULTS
PULMONARY AND CRITICAL CARE INPATIENT CONSULTATION      3/6/2023    Patient Name:  Mathew Nathan       1955       Evaluation was requested by Dr. Chelsey Barrera regarding hypoxia. HPI:   Patient is a 79 y.o. female with significant past medical history of lung adenocarcinoma, chronic hypoxic respiratory failure on 1 to 2 L of oxygen, DVT, prior PE, pleural effusion, that presents to [facility] for cough. Patient reports that this weekend her oxygen saturations began to drop below 80%. She endorses cough with productive bloody sputum. She has shortness of breath with exertion but not at rest.  She denied any fever, chills, chest pain, nausea, vomiting, abdominal pain. Patient was recently hospitalized on 2/13/2023 for hypoxic respiratory failure secondary to PE and pneumonia. She was treated for COVID also during that admission. She was discharged on oxygen therapy. Patient was also diagnosed with lung adenocarcinoma during that admission. Patient is a never smoker, no illicit drug use, no alcohol use. Pulmonary was consulted for hypoxia. Invasive Lines: PICC D#none CVC D#none  Art Line D#none            ROS:   Review of Systems   Constitutional:  Negative for activity change, appetite change, chills, diaphoresis and fatigue. HENT:  Negative for congestion, sore throat and trouble swallowing. Eyes:  Negative for pain, discharge, redness and itching. Respiratory:  Positive for cough and shortness of breath. Negative for wheezing and stridor. Cardiovascular:  Negative for chest pain, palpitations and leg swelling. Gastrointestinal:  Negative for abdominal distention, abdominal pain, constipation, diarrhea, nausea and vomiting. Endocrine: Negative for polydipsia, polyphagia and polyuria. Genitourinary:  Negative for difficulty urinating. Musculoskeletal:  Negative for back pain, myalgias and neck pain. Skin:  Negative for color change.    Neurological:  Negative for dizziness, weakness and light-headedness. Psychiatric/Behavioral:  Negative for agitation and behavioral problems. Past Medical History:   Diagnosis Date    Asthma     Cancer [facility])         Past Surgical History:   Procedure Laterality Date    CT BIOPSY PERCUTANEOUS DEEP BONE  2/20/2023    CT BIOPSY PERCUTANEOUS DEEP BONE 2/20/2023 Brielle Toledo MD [facility] CT SCAN        Family History   Problem Relation Age of Onset    Cancer Paternal Grandmother     Cancer Paternal Grandfather         Social History     Tobacco Use    Smoking status: Never    Smokeless tobacco: Never   Substance Use Topics    Alcohol use: Not Currently        Allergies   Allergen Reactions    Hydrocodone     Propoxyphene     Penicillin G Rash         Vital Signs:  Blood pressure 107/74, pulse (!) 117, temperature 98.4 °F (36.9 °C), temperature source Oral, resp. rate 17, height 5' 1\" (1.549 m), weight 176 lb 3.2 oz (79.9 kg), SpO2 95 %.' on RA  Body mass index is 33.29 kg/m². CVP:    No intake or output data in the 24 hours ending 03/06/23 1658      PHYSICAL EXAM:  Physical Exam  Constitutional:       General: She is not in acute distress. Appearance: She is not toxic-appearing. HENT:      Head: Normocephalic and atraumatic. Nose: Nose normal.      Mouth/Throat:      Pharynx: No oropharyngeal exudate. Eyes:      General: No scleral icterus. Right eye: No discharge. Left eye: No discharge. Cardiovascular:      Rate and Rhythm: Normal rate and regular rhythm. Heart sounds: No murmur heard. No friction rub. No gallop. Pulmonary:      Effort: Pulmonary effort is normal. No respiratory distress. Breath sounds: No wheezing or rales. Abdominal:      General: Abdomen is flat. Bowel sounds are normal.      Palpations: Abdomen is soft. Musculoskeletal:         General: No swelling. Normal range of motion. Cervical back: Normal range of motion. Skin:     General: Skin is warm and dry. Neurological:      General: No focal deficit present. Mental Status: She is alert and oriented to person, place, and time. Psychiatric:         Mood and Affect: Mood normal.        Results:  CBC:   Recent Labs     03/06/23  1310   WBC 26.7*   HGB 11.5*   HCT 34.9*   MCV 86.6   *     BMP:   Recent Labs     03/06/23  1310   *   K 3.6   CL 98*   CO2 20*   BUN 41*   CREATININE 0.6       Echocardiogram:  PFT:    Imaging:  I have reviewed radiology images personally. XR CHEST PORTABLE    Result Date: 3/6/2023  1. Interval worsening of diffuse airspace opacities which could represent edema or infection. 2.  Prominence of the left superior pulmonary hilum shown to represent a probable carcinoma on the comparison CT chest from 13 February 2023     CT CHEST PULMONARY EMBOLISM W CONTRAST    Result Date: 3/6/2023  1. Acute bilateral pulmonary emboli seen on prior CT have resolved. 2. Diffuse ground-glass opacity throughout both lungs has increased and could be due to atypical pneumonia. 3. Mass in the posterior left upper lobe extending into the superior left lower lobe has not significantly changed and is consistent with known malignancy. 4. Mediastinal and hilar lymphadenopathy has not significantly changed from prior CT. 5. Diffuse osseous metastatic disease. 6. Partially visualized hepatic metastatic disease. CT PE 3/6/2023  No pericardial effusion. Trace left pleural effusion. Mediastinal adenopathy. Left upper lobe mass. Diffuse bilateral groundglass opacities.     ASSESSMENT:  Acute on chronic hypoxic respiratory failure  Lung adenocarcinoma stage IV  Obesity  Mediastinal adenopathy  Bilateral PE  DVT  Leukocytosis  Thrombocytopenia  Anion gap metabolic acidosis  Lactic acidosis    Plan:   - AOx3, answering questions appropriately  - Fluid resuscitated, not requiring pressors  - cont to trend lactic acid q6h  - cont HFNC with goal saturation 88-92%, she is currently on 7L high-flow  - CTPE with no PE, diffuse b/l GGO's. Much worse ground-glass opacities as comparison to prior. Could have atypical PNA with ground-glass opacities, cont broad spectrum given recent hospitalization.  - If no improvement in oxygenation, will need to reassess RV as she has reduced function and high right-sided pressures at last hospitalization. - cont Vanc/Levaquin, blood cx pending  - Regular diet  - Replace electrolytes PRN  - cont Eliquis for prior PE/ DVT ppx  - SSl for hyglycmia, keep blood glucose 140-180    Ppx/Obrien/Lines  - No obrien  - PIV  - Eliquis for ppx    Critical care time spent reviewing labs/films, examining patient, collaborating with other physicians but excluding procedures for life threatening organ failure is 38 minutes. Thank you for the consultation. We will continue to follow with you.     Electronically signed by:  Mel Garibay MD    3/6/2023    4:58 PM.

## 2023-03-06 NOTE — H&P
Hospital Medicine History & Physical      PCP: YANN Shaw CNP    Date of Admission: 3/6/2023    Date of Service: Pt seen/examined on 03/06/23  and Admitted to Inpatient with expected LOS greater than two midnights due to medical therapy. Chief Complaint   Patient presents with    Shortness of Breath     Pt has a new cancer diagnosis (February 2023) was discharged on 2/22/23 wearing home oxygen. Had a chest x ray on Friday where pt was dx with pneumonia. Pt feeling weak and SOB. She has not started any therapy for her cancer yet. History Of Present Illness:   79 y.o. female who got currently diagnosed with adenocarcinoma of lung with metastasis to liver/bone/brain in February 2023 and got discharged from this facility on 2/22/2023 on 4 L/min home oxygen need, PE being treated with Eliquis presented to [facility] ED with C/o sudden onset of worsening shortness of breath for the past couple of days. Patient reports she felt better initially and  reports that her oxygen requirement has come down to 1.5 L/min until 3/2/23. She started getting more short of breath since 3/3/2023 and her oxygen requirement has been progressively increased up to 5 L/min. This morning she had her oxygen saturations dropped into mid 80s and hence presented to ED for further evaluation and management. She reports exertional shortness of breath but denies any chest pain, cough, hemoptysis, hematemesis, fever, chills, recent ill contacts. Denies smoking, drinking, illicit drug use. Endorses compliance to her Eliquis and other medications. Reports that she followed up with oncology (Missy Pena)  and radiation oncology (Sydnee Lomeli) as Outpatient since her discharge     ED Course -patient noted to be tachycardic, tachypneic, hypoxic 86% on 5 L/min oxygen by nasal cannula and hypotensive upon arrival to ED. Basic labs showed leukocytosis 26.7 and elevated lactic 5.7 .   CTPA showed bilateral PE resolved; diffuse GGO throughout both lung fields due to atypical pneumonia; mass in the posterior TERI extending into superior left lower lobe noted. Mediastinal and hilar lymphadenopathy present; diffuse osseous metastatic disease along with partially visualized hepatic metastatic disease noted. Patient met severe sepsis criteria for which she received 30 mill per KG fluid bolus in ED and empirically started on IV Rocephin/Levaquin and hospitalist consulted for admission for further evaluation and management    Upon evaluation by me in ED, patient seen with her  at bedside. Currently on 8L/min oxygen by nasal cannula and appears to be comfortable. Noted accessory muscles of breathing. Fine crackles noted on bibasilar areas. The patient and her   were informed of the results of tests, a time was given to answer questions, a plan was proposed and they agreed with plan. Also discussed regarding CODE STATUS/goals of care with patient including her advanced directives. Patient requests more time to discuss with her  regarding goals of care and would like to get back to this provider once final decision is made.    -We will admit patient to ICU for further closer monitoring. Continue IV hydration and broaden antibiotic coverage with vancomycin/Levaquin. Requested pulmonology consultation to assist with management. Called and discussed case with Dr. Abdirizak Benson (On call Pulmonologist )       Past Medical History:      Diagnosis Date    Asthma     Cancer [facility])        Past Surgical History:        Procedure Laterality Date    CT BIOPSY PERCUTANEOUS DEEP BONE  2/20/2023    CT BIOPSY PERCUTANEOUS DEEP BONE 2/20/2023 Donald Perkins MD [facility] CT SCAN       Medications Prior to Admission:    Prior to Admission medications    Medication Sig Start Date End Date Taking?  Authorizing Provider   doxycycline hyclate (VIBRA-TABS) 100 MG tablet Take 1 tablet by mouth 2 times daily for 14 days 3/6/23 3/20/23  Shakila Sterling Dillow, APRN - CNP   levoFLOXacin (LEVAQUIN) 500 MG tablet TAKE 1 TABLET BY MOUTH ONCE DAILY 3/5/23   Historical Provider, MD   traMADol (ULTRAM) 50 MG tablet Take 1 tablet by mouth every 6 hours as needed for Pain for up to 14 days. Intended supply: 14 days.  Take lowest dose possible to manage pain Max Daily Amount: 200 mg 2/24/23 3/10/23  YANN Nicolas CNP   apixaban (ELIQUIS) 5 MG TABS tablet Take 1 tablet by mouth 2 times daily 3/1/23   YANN Monzon CNP   ascorbic acid (VITAMIN C) 500 MG tablet Take 1 tablet by mouth daily 2/23/23   YANN Monzon CNP   Coenzyme Q10-Fish Oil-Vit E (CO-Q 10 OMEGA-3 FISH OIL PO) Take by mouth daily    Historical Provider, MD   CALCIUM LACTATE PO Take 250 mg by mouth in the morning and at bedtime    Historical Provider, MD   Bacillus Coagulans-Inulin (ALIGN PREBIOTIC-PROBIOTIC PO) Take by mouth daily    Historical Provider, MD   TURMERIC PO Take 1,000 mg by mouth daily    Historical Provider, MD   VITAMIN D PO Take 10,000 Int'l Units by mouth daily    Historical Provider, MD   Biotin 1000 MCG TABS Take 10,000 mcg by mouth nightly    Historical Provider, MD   magnesium lactate (MAG-TAB CR) 84 MG (7MEQ) TBCR extended release tablet Take by mouth nightly    Historical Provider, MD   Multiple Vitamins-Minerals (ICAPS AREDS 2 PO) Take 1 tablet by mouth in the morning and at bedtime    Historical Provider, MD   L-Lysine 1000 MG TABS Take by mouth daily    Historical Provider, MD   Melatonin 5 MG CHEW Take 5 mg by mouth nightly    Historical Provider, MD   atorvastatin (LIPITOR) 40 MG tablet atorvastatin 40 mg tablet    Historical Provider, MD   thyroid (ARMOUR) 30 MG tablet Take one tablet on Monday, Weednesday, and Friday 7/26/17   Historical Provider, MD   losartan-hydroCHLOROthiazide (HYZAAR) 100-12.5 MG per tablet Takes half in morning and half at night    Historical Provider, MD   omeprazole (PRILOSEC) 40 MG delayed release capsule omeprazole 40 mg capsule,delayed release    Historical Provider, MD   thyroid (ARMOUR) 60 MG tablet Take 60 mg by mouth daily 1 tablet on Sunday, Tuesday, Thursday and Saturday    Historical Provider, MD       Allergies:  Hydrocodone, Propoxyphene, and Penicillin g    Social History:    The patient currently lives  at home and is independent of IADLs     TOBACCO:   reports that she has never smoked. She has never used smokeless tobacco.  ETOH:   reports that she does not currently use alcohol. Family History:     Reviewed in detail and negative for DM, CAD, Cancer, CVA. Positive as follows:        Problem Relation Age of Onset    Cancer Paternal Grandmother     Cancer Paternal Grandfather        REVIEW OF SYSTEMS:   Pertinent positives as noted in the HPI. All other systems reviewed and negative. PHYSICAL EXAM PERFORMED:  /74   Pulse (!) 117   Temp 98.4 °F (36.9 °C) (Oral)   Resp 17   Ht 5' 1\" (1.549 m)   Wt 176 lb 3.2 oz (79.9 kg)   SpO2 95%   BMI 33.29 kg/m²     General appearance: In mild respiratory distress, appears stated age and cooperative. HFNC 8 L/min  HEENT:  Normal cephalic, atraumatic without obvious deformity. Pupils equal, round, and reactive to light. Extra ocular muscles intact. Conjunctivae/corneas clear. Neck: Supple, with full range of motion. No jugular venous distention. Trachea midline. Respiratory: Increased respiratory effort. Accessory respiratory muscle usage noted, diminished breath sounds at bases bilaterally with occasional  Rales  Cardiovascular:  Regular rate and rhythm with normal S1/S2 without murmurs, rubs or gallops. Abdomen: Soft, non-tender, non-distended with normal bowel sounds. Musculoskeletal:  No clubbing, cyanosis or edema bilaterally. Full range of motion without deformity. Skin: Skin color, texture, turgor normal.  No rashes or lesions. Neurologic:  Neurovascularly intact without any focal sensory/motor deficits.  Cranial nerves: II-XII intact, grossly non-focal.  Psychiatric:  Alert and oriented, thought content appropriate, normal insight  Capillary Refill: Brisk,< 3 seconds   Peripheral Pulses: +2 palpable, equal bilaterally       Labs:   Recent Labs     03/06/23  1310   WBC 26.7*   HGB 11.5*   HCT 34.9*   *     Recent Labs     03/06/23  1310   *   K 3.6   CL 98*   CO2 20*   BUN 41*   CREATININE 0.6   CALCIUM 9.9     Recent Labs     03/06/23  1310   AST 35   ALT 29   BILITOT 0.9   ALKPHOS 377*     No results for input(s): INR in the last 72 hours.  Recent Labs     03/06/23  1310   TROPONINI 0.05*       EKG:  I have reviewed the EKG with the following interpretation: Sinus tachycardia, LVH with repolarization abnormality        Radiology:    I have reviewed the Imaging  with the following interpretation:   CT CHEST PULMONARY EMBOLISM W CONTRAST   Final Result   1. Acute bilateral pulmonary emboli seen on prior CT have resolved.   2. Diffuse ground-glass opacity throughout both lungs has increased and could   be due to atypical pneumonia.   3. Mass in the posterior left upper lobe extending into the superior left   lower lobe has not significantly changed and is consistent with known   malignancy.   4. Mediastinal and hilar lymphadenopathy has not significantly changed from   prior CT.   5. Diffuse osseous metastatic disease.   6. Partially visualized hepatic metastatic disease.         XR CHEST PORTABLE   Final Result   1.  Interval worsening of diffuse airspace opacities which could represent   edema or infection.      2.  Prominence of the left superior pulmonary hilum shown to represent a   probable carcinoma on the comparison CT chest from 13 February 2023               Active Hospital Problems    Diagnosis Date Noted    Severe sepsis (HCC) [A41.9, R65.20] 03/06/2023     Priority: Medium     Consults:  IP CONSULT TO HOSPITALIST  IP CONSULT TO PULMONOLOGY  IP CONSULT TO PHARMACY    ASSESSMENT/PLAN:  Acute on chronic hypoxic respiratory failure  with severe sepsis secondary to atypical pneumonia POA  -71-year-old female recently diagnosed (2/12/2023 )with metastatic lung cancer with spread to liver/bone/brain and with chronic hypoxic respiratory failure 4 L/min p/w worsening shortness of breath. CTPA showed resolution of her bilateral PE but multifocal bilateral infiltrates c/w atypical pneumonia.   -She met severe sepsis criteria -tachycardia, tachypnea, leukocytosis, elevated lactic(5.7) and imaging consistent with atypical pneumonia. Flu/COVID testing in the ED negative. Ordered 30 mill per KG fluid bolus and trend lactic. Received empiric IV Rocephin/Levaquin in ED-broaden coverage to Vanco and Levaquin (could not use cefepime/Zosyn given patient's allergy to penicillins)   -Patient currently requiring 8 L/min HFNC. Will admit to ICU for closer monitoring. Continue IV hydration and antibiotics  -Request pulmonology assistance with management. - Had [address] conversation with patient and her  at bedside in ED -patient to discuss with other family members and come with final decision. We will keep her for full code at this time. 2.  Metastatic lung cancer(adenocarcinoma) with spread to liver, bone, brain -as per work-up during last admission (2/13/23- 2/22/23)   - Patient follows with Dr. Seema Hale and Dr. Yana Falcon but hasn't initiated on Rx yet . Awaiting caris mutation . Will consult Oncology in AM    3. Recently diagnosed with PE -on Eliquis at home; continue  -CTPA on admission showed resolution of PE when compared to prior CT scan. DVT Prophylaxis: Eliquis  Diet: No diet orders on file  Code Status: Prior    PT/OT Eval Status: Not yet ordered     Dispo -anticipate more than 2 midnight stay in the hospital     Katy Vargas MD    The note was completed using Dragon -speech recognition software & EMR  . Every effort was made to ensure accuracy; however, inadvertent computerized transcription errors may be present.     Thank you YANN Knight CNP for the opportunity to be involved in this patient's care. If you have any questions or concerns please feel free to contact me at [phone].

## 2023-03-06 NOTE — ED PROVIDER NOTES
Emergency Department Attending Provider Note  Location: [address] [place] [address]  ED  3/6/2023   Note Started: 4:10 PM EST 3/6/23       Patient Identification  Dandre Carvajal is a 79 y.o. female    James Baldwin was evaluated in the Emergency Department for shortness of breath. Patient presents to the emergency department stating that she has a new cancer diagnosis/PE on 2/23. Patient was discharged home on 2/22/2023 with home oxygen. He reportedly had a chest x-ray completed last Friday which was concerning for pneumonia. Patient is currently on Eliquis and oral antibiotics. She arrives into the emergency department with hypoxia/tachycardia. Although initial history and physical exam information was obtained by GABY/NPP/MD/ (who also dictated a record of this visit), I personally saw the patient and performed a substantive portion of the visit including all aspects of the medical decision making. PHYSICAL EXAM:  General: Acute respiratory distress. Head: Normocephalic/atraumatic. Heart: Tachycardic. Normal S1-S2. No rubs, gallops, murmurs. Lungs: Mild right basilar rale. Tachypnea. No wheezing or rhonchi. Abdomen: Soft. Extremities: No swelling or edema. EKG Interpretation  Sinus tachycardia with a rate of 120. Normal intervals and durations. LVH noted. Nonspecific ST and T wave changes. No significant change when compared to previous EKG on 2/13/2023    Patient seen and evaluated. Relevant records reviewed. MDM:    Patient presents to the emergency department with shortness of breath. This has been progressive since being discharged from the hospital after recent hospitalization for PE/new cancer. Patient tachycardic/hypoxic upon arrival.  Afebrile. COVID and flu testing are negative. Patient has a white count of 26.7 with elevated lactic acid. Additionally, troponin is mildly elevated, but BNP is near normal limits.   Oxygen supplementation, saline, and broad-spectrum antibiotics initiated. Patient will be admitted. CLINICAL IMPRESSION  1. Severe sepsis ([address]    2. Hypoxia    3. Pneumonia due to infectious organism, unspecified laterality, unspecified part of lung          INikos DO, am the primary clinician of record. I personally saw the patient and independently provided 35 minutes of non-concurrent critical care out of the total shared critical care time provided. This chart was generated in part by using Dragon Dictation system and may contain errors related to that system including errors in grammar, punctuation, and spelling, as well as words and phrases that may be inappropriate. If there are any questions or concerns please feel free to contact the dictating provider for clarification.      Ravinder Murguia DO   [employer]       Ravinder Murguia DO  03/06/23 3444

## 2023-03-06 NOTE — DISCHARGE INSTR - COC
Continuity of Care Form    Patient Name: Mathew Nathan   :  1955  MRN:  [id number]    Admit date:  3/6/2023  Discharge date:  23    Code Status Order: Prior   Advance Directives:     Admitting Physician:  No admitting provider for patient encounter. PCP: YANN Robert CNP    Discharging Nurse: Lorene Castillo [facility] Unit/Room#:   Discharging Unit Phone Number: [phone]    Emergency Contact:   Extended Emergency Contact Information  Primary Emergency Contact: Letitia Sutherland Phone: [phone]  Relation: Child   needed? No  Secondary Emergency Contact: [address] Phone: [phone]  Relation: Spouse   needed? No    Past Surgical History:  Past Surgical History:   Procedure Laterality Date    CT BIOPSY PERCUTANEOUS DEEP BONE  2023    CT BIOPSY PERCUTANEOUS DEEP BONE 2023 Fred Sanchez MD [facility] CT SCAN       Immunization History: There is no immunization history on file for this patient.     Active Problems:  Patient Active Problem List   Diagnosis Code    Acute respiratory failure (HCC) J96.00    Acute deep vein thrombosis (DVT) of proximal vein of left lower extremity (HCC) I82.4Y2    Bilateral pulmonary embolism (HCC) I26.99    Elevated troponin R77.8    Deep venous thrombosis (HCC) I82.409    Pericardial effusion I31.39    Pleural effusion on left J90    Pneumonia due to COVID-19 virus U07.1, J12.82    Mass of left lung R91.8    Mediastinal adenopathy R59.0    Hilar adenopathy R59.0    Metastasis to liver (HCC) C78.7       Isolation/Infection:   Isolation            No Isolation          Patient Infection Status       Infection Onset Added Last Indicated Last Indicated By Review Planned Expiration Resolved Resolved By    None active    Resolved    COVID-19 (Rule Out) 23 COVID-19 & Influenza Combo (Ordered)   23 Rule-Out Test Resulted    COVID-19 23 COVID-19 & Influenza Combo   02/21/23 Nila Ball RN    COVID-19 (Rule Out) 02/13/23 02/13/23 02/13/23 COVID-19 & Influenza Combo (Ordered)   02/13/23 Rule-Out Test Resulted            Nurse Assessment:  Last Vital Signs: /74   Pulse (!) 117   Temp 98.4 °F (36.9 °C) (Oral)   Resp 17   Ht 5' 1\" (1.549 m)   Wt 176 lb 3.2 oz (79.9 kg)   SpO2 95%   BMI 33.29 kg/m²     Last documented pain score (0-10 scale):    Last Weight:   Wt Readings from Last 1 Encounters:   03/06/23 176 lb 3.2 oz (79.9 kg)     Mental Status:  oriented, alert, coherent, logical, thought processes intact, and able to concentrate and follow conversation    IV Access:  - None    Nursing Mobility/ADLs:  Walking   Dependent  Transfer  [address]  Assisted  Med Delivery   whole    Wound Care Documentation and Therapy:        Elimination:  Continence: Bowel: No  Bladder: No  Urinary Catheter: Insertion Date: 03-09-23    Colostomy/Ileostomy/Ileal Conduit: No       Date of Last BM: unknown  No intake or output data in the 24 hours ending 03/06/23 1610  No intake/output data recorded. Safety Concerns: At Risk for Falls    Impairments/Disabilities:      None    Nutrition Therapy:  Current Nutrition Therapy:   - Oral Diet:  General    Routes of Feeding: Oral  Liquids:  Thin Liquids  Daily Fluid Restriction: no  Last Modified Barium Swallow with Video (Video Swallowing Test): not done    Treatments at the Time of Hospital Discharge:   Respiratory Treatments: none  Oxygen Therapy:  is on 15 LPM non-rebreather  Ventilator:    - No ventilator support    Rehab Therapies: none  Weight Bearing Status/Restrictions: No weight bearing restrictions  Other Medical Equipment (for information only, NOT a DME order):  n/a  Other Treatments: none    Patient's personal belongings (please select all that are sent with patient):  None    RN SIGNATURE:  Electronically signed by Lorene Castillo RN on 3/9/23 at 7:36 PM EST    CASE MANAGEMENT/SOCIAL WORK SECTION    Inpatient Status Date: ***    Readmission Risk Assessment Score:  Readmission Risk              Risk of Unplanned Readmission:  0           Discharging to Facility/ Agency   Name:   Address:  Phone:  Fax:    Dialysis Facility (if applicable)   Name:  Address:  Dialysis Schedule:  Phone:  Fax:    / signature: {Esignature:[id number]}    PHYSICIAN SECTION    Prognosis: Guarded    Condition at Discharge: Terminal    Rehab Potential (if transferring to Rehab): Poor    Recommended Labs or Other Treatments After Discharge: [facility]     Physician Certification: I certify the above information and transfer of Mathew Nathan  is necessary for the continuing treatment of the diagnosis listed and that she requires Hospice for less 30 days.      Update Admission H&P: No change in H&P    PHYSICIAN SIGNATURE:  Electronically signed by Yamilet Stubbs MD on 3/9/23 at 1:24 PM EST

## 2023-03-06 NOTE — ED NOTES
Critical lab called to er, Lactic acid of 5.7  BJ np is updated.       Eloisa Gifford, RN  03/06/23 1367

## 2023-03-06 NOTE — CONSULTS
Pharmacy Note  Vancomycin Consult    Dx: HAP    Allergies:  Hydrocodone, Propoxyphene, and Penicillin g     Recent Labs     03/06/23  1310   CREATININE 0.6       Recent Labs     03/06/23  1310   WBC 26.7*       Estimated Creatinine Clearance: 87 mL/min (based on SCr of 0.6 mg/dL). No intake or output data in the 24 hours ending 03/06/23 1729    Wt Readings from Last 1 Encounters:   03/06/23 176 lb 3.2 oz (79.9 kg)         Goal Vancomycin trough: 15-20 mcg/mL   Goal Vancomycin AUC: 400-600     Assessment/Plan:  Give Vancomycin 1250mg IVPB Q12H  Vancomcyin level 3/7 1700  Est trough - 17.5  Est AUC - [address] Russell PharmD 3/6/2023 5:32 PM     Day 2/7  Estimated Creatinine Clearance: 87 mL/min (based on SCr of 0.6 mg/dL). WBC 25.6  NGTD  Vancomycin 1250mg IVPB q12h  Vancomycin AUC ~560  Vancomycin level this evening  Fredrick MendenhallD, BCPS   3/7/2023 7:49 AM    Day 2  Estimated Creatinine Clearance: 87 mL/min (based on SCr of 0.6 mg/dL). Vancomycin 1250 mg q12h  Vanc level - 14.2 mcg/mL  AUC > 600  Will decrease dose to vancomycin 1000 mg q12h  Vanc level 3/8 1800  [address] [place] [address] [place], PharmD 3/7/2023 6:38 PM    Day 4  Estimated Creatinine Clearance: 106 mL/min (based on SCr of 0.5 mg/dL).   Vancomycin 1000 mg q12h     Continue current dosing  [address] [address] [place], PharmD 3/9/2023 11:14 AM

## 2023-03-07 PROBLEM — E43 SEVERE MALNUTRITION (HCC): Status: ACTIVE | Noted: 2023-03-07

## 2023-03-07 LAB
ANION GAP SERPL CALCULATED.3IONS-SCNC: 13 MMOL/L (ref 3–16)
ANISOCYTOSIS: ABNORMAL
BANDED NEUTROPHILS RELATIVE PERCENT: 2 % (ref 0–7)
BASOPHILS ABSOLUTE: 0 K/UL (ref 0–0.2)
BASOPHILS RELATIVE PERCENT: 0 %
BUN BLDV-MCNC: 26 MG/DL (ref 7–20)
CALCIUM SERPL-MCNC: 9.5 MG/DL (ref 8.3–10.6)
CHLORIDE BLD-SCNC: 107 MMOL/L (ref 99–110)
CO2: 22 MMOL/L (ref 21–32)
CREAT SERPL-MCNC: 0.6 MG/DL (ref 0.6–1.2)
EKG ATRIAL RATE: 130 BPM
EKG DIAGNOSIS: NORMAL
EKG P AXIS: 43 DEGREES
EKG P-R INTERVAL: 166 MS
EKG Q-T INTERVAL: 280 MS
EKG QRS DURATION: 72 MS
EKG QTC CALCULATION (BAZETT): 412 MS
EKG R AXIS: 5 DEGREES
EKG T AXIS: 100 DEGREES
EKG VENTRICULAR RATE: 130 BPM
EOSINOPHILS ABSOLUTE: 0 K/UL (ref 0–0.6)
EOSINOPHILS RELATIVE PERCENT: 0 %
GFR SERPL CREATININE-BSD FRML MDRD: >60 ML/MIN/{1.73_M2}
GLUCOSE BLD-MCNC: 96 MG/DL (ref 70–99)
HCT VFR BLD CALC: 31.6 % (ref 36–48)
HEMOGLOBIN: 10.3 G/DL (ref 12–16)
LACTIC ACID: 3 MMOL/L (ref 0.4–2)
LYMPHOCYTES ABSOLUTE: 2.3 K/UL (ref 1–5.1)
LYMPHOCYTES RELATIVE PERCENT: 9 %
MACROCYTES: ABNORMAL
MCH RBC QN AUTO: 28.5 PG (ref 26–34)
MCHC RBC AUTO-ENTMCNC: 32.5 G/DL (ref 31–36)
MCV RBC AUTO: 87.8 FL (ref 80–100)
METAMYELOCYTES RELATIVE PERCENT: 1 %
MONOCYTES ABSOLUTE: 1 K/UL (ref 0–1.3)
MONOCYTES RELATIVE PERCENT: 4 %
NEUTROPHILS ABSOLUTE: 22.3 K/UL (ref 1.7–7.7)
NEUTROPHILS RELATIVE PERCENT: 84 %
OVALOCYTES: ABNORMAL
PDW BLD-RTO: 20.3 % (ref 12.4–15.4)
PLATELET # BLD: 90 K/UL (ref 135–450)
PLATELET SLIDE REVIEW: ABNORMAL
PMV BLD AUTO: 9.2 FL (ref 5–10.5)
POIKILOCYTES: ABNORMAL
POLYCHROMASIA: ABNORMAL
POTASSIUM REFLEX MAGNESIUM: 3.9 MMOL/L (ref 3.5–5.1)
RBC # BLD: 3.6 M/UL (ref 4–5.2)
SODIUM BLD-SCNC: 142 MMOL/L (ref 136–145)
VANCOMYCIN RANDOM: 14.2 UG/ML
WBC # BLD: 25.6 K/UL (ref 4–11)

## 2023-03-07 PROCEDURE — 2580000003 HC RX 258: Performed by: INTERNAL MEDICINE

## 2023-03-07 PROCEDURE — 6360000002 HC RX W HCPCS: Performed by: INTERNAL MEDICINE

## 2023-03-07 PROCEDURE — 93010 ELECTROCARDIOGRAM REPORT: CPT | Performed by: INTERNAL MEDICINE

## 2023-03-07 PROCEDURE — 99233 SBSQ HOSP IP/OBS HIGH 50: CPT | Performed by: STUDENT IN AN ORGANIZED HEALTH CARE EDUCATION/TRAINING PROGRAM

## 2023-03-07 PROCEDURE — 6360000002 HC RX W HCPCS

## 2023-03-07 PROCEDURE — 85025 COMPLETE CBC W/AUTO DIFF WBC: CPT

## 2023-03-07 PROCEDURE — 2060000000 HC ICU INTERMEDIATE R&B

## 2023-03-07 PROCEDURE — 83605 ASSAY OF LACTIC ACID: CPT

## 2023-03-07 PROCEDURE — 93005 ELECTROCARDIOGRAM TRACING: CPT

## 2023-03-07 PROCEDURE — 6370000000 HC RX 637 (ALT 250 FOR IP): Performed by: INTERNAL MEDICINE

## 2023-03-07 PROCEDURE — 80202 ASSAY OF VANCOMYCIN: CPT

## 2023-03-07 PROCEDURE — 36415 COLL VENOUS BLD VENIPUNCTURE: CPT

## 2023-03-07 PROCEDURE — 2500000003 HC RX 250 WO HCPCS

## 2023-03-07 PROCEDURE — 6370000000 HC RX 637 (ALT 250 FOR IP)

## 2023-03-07 PROCEDURE — 80048 BASIC METABOLIC PNL TOTAL CA: CPT

## 2023-03-07 RX ORDER — MECOBALAMIN 5000 MCG
5 TABLET,DISINTEGRATING ORAL NIGHTLY
Status: DISCONTINUED | OUTPATIENT
Start: 2023-03-07 | End: 2023-03-09 | Stop reason: HOSPADM

## 2023-03-07 RX ORDER — LEVOTHYROXINE AND LIOTHYRONINE 19; 4.5 UG/1; UG/1
60 TABLET ORAL
Status: DISCONTINUED | OUTPATIENT
Start: 2023-03-07 | End: 2023-03-09 | Stop reason: HOSPADM

## 2023-03-07 RX ORDER — LEVOTHYROXINE AND LIOTHYRONINE 19; 4.5 UG/1; UG/1
30 TABLET ORAL
Status: DISCONTINUED | OUTPATIENT
Start: 2023-03-08 | End: 2023-03-09 | Stop reason: HOSPADM

## 2023-03-07 RX ORDER — POTASSIUM CHLORIDE 7.45 MG/ML
10 INJECTION INTRAVENOUS PRN
Status: DISCONTINUED | OUTPATIENT
Start: 2023-03-07 | End: 2023-03-09 | Stop reason: HOSPADM

## 2023-03-07 RX ORDER — FUROSEMIDE 10 MG/ML
40 INJECTION INTRAMUSCULAR; INTRAVENOUS ONCE
Status: COMPLETED | OUTPATIENT
Start: 2023-03-07 | End: 2023-03-07

## 2023-03-07 RX ORDER — POTASSIUM CHLORIDE 20 MEQ/1
40 TABLET, EXTENDED RELEASE ORAL PRN
Status: DISCONTINUED | OUTPATIENT
Start: 2023-03-07 | End: 2023-03-09 | Stop reason: HOSPADM

## 2023-03-07 RX ORDER — BISACODYL 10 MG
10 SUPPOSITORY, RECTAL RECTAL DAILY PRN
Status: DISCONTINUED | OUTPATIENT
Start: 2023-03-07 | End: 2023-03-09 | Stop reason: HOSPADM

## 2023-03-07 RX ORDER — LEVOTHYROXINE AND LIOTHYRONINE 19; 4.5 UG/1; UG/1
60 TABLET ORAL
Status: DISCONTINUED | OUTPATIENT
Start: 2023-03-09 | End: 2023-03-07

## 2023-03-07 RX ADMIN — THYROID, PORCINE 60 MG: 30 TABLET ORAL at 13:58

## 2023-03-07 RX ADMIN — VANCOMYCIN HYDROCHLORIDE 1000 MG: 1 INJECTION, POWDER, LYOPHILIZED, FOR SOLUTION INTRAVENOUS at 19:40

## 2023-03-07 RX ADMIN — SODIUM CHLORIDE: 9 INJECTION, SOLUTION INTRAVENOUS at 22:15

## 2023-03-07 RX ADMIN — MICONAZOLE NITRATE: 2 POWDER TOPICAL at 22:17

## 2023-03-07 RX ADMIN — ATORVASTATIN CALCIUM 40 MG: 40 TABLET, FILM COATED ORAL at 22:19

## 2023-03-07 RX ADMIN — PANTOPRAZOLE SODIUM 40 MG: 40 TABLET, DELAYED RELEASE ORAL at 08:24

## 2023-03-07 RX ADMIN — MUPIROCIN: 20 OINTMENT TOPICAL at 08:25

## 2023-03-07 RX ADMIN — VANCOMYCIN HYDROCHLORIDE 1250 MG: 10 INJECTION, POWDER, LYOPHILIZED, FOR SOLUTION INTRAVENOUS at 05:34

## 2023-03-07 RX ADMIN — VANCOMYCIN HYDROCHLORIDE 1000 MG: 1 INJECTION, POWDER, LYOPHILIZED, FOR SOLUTION INTRAVENOUS at 22:16

## 2023-03-07 RX ADMIN — MICONAZOLE NITRATE: 2 POWDER TOPICAL at 09:51

## 2023-03-07 RX ADMIN — APIXABAN 5 MG: 5 TABLET, FILM COATED ORAL at 08:25

## 2023-03-07 RX ADMIN — FUROSEMIDE 40 MG: 10 INJECTION, SOLUTION INTRAMUSCULAR; INTRAVENOUS at 09:51

## 2023-03-07 RX ADMIN — SODIUM CHLORIDE: 9 INJECTION, SOLUTION INTRAVENOUS at 02:34

## 2023-03-07 RX ADMIN — LEVOFLOXACIN 750 MG: 5 INJECTION, SOLUTION INTRAVENOUS at 08:26

## 2023-03-07 RX ADMIN — APIXABAN 5 MG: 5 TABLET, FILM COATED ORAL at 22:19

## 2023-03-07 RX ADMIN — Medication 5 MG: at 22:19

## 2023-03-07 NOTE — PROGRESS NOTES
Shift: 1109-8483    Admitting diagnosis: Hypoxic respiratory failure, sepsis    Presentation to hospital: cough, productive bloody sputum + SOB, SPO2 80's    Surgery: no     Nursing assessment at handoff  stable    Emergency Contact/POA: Sydney (Child)  Family updated: yes     Most recent vitals: BP (!) 145/78   Pulse (!) 117   Temp 98.2 °F (36.8 °C) (Oral)   Resp 19   Ht 5' 1\" (1.549 m)   Wt 174 lb 9.7 oz (79.2 kg)   SpO2 92%   BMI 32.99 kg/m²      Rhythm: Sinus Tachycardia     NC/HFNC- 6 lpm HFNC  Respiratory support: - No ventilator support    Vent days: N/A    Increased O2 requirements: no    Admission weight Weight: 176 lb 3.2 oz (79.9 kg)  Today's weight   Wt Readings from Last 1 Encounters:   03/07/23 174 lb 9.7 oz (79.2 kg)         UOP >30ml/hr: yes -     Garcia need assessed each shift: N/A    Restraints: no  Order current and documentation up to date? Lines/Drains  LDA Insertion Date Discontinued Date Dressing Changes   PIV       TLC       Arterial       Garcia       Vas Cath      ETT       Surgical drains        Night Shift Hospitalist Interventions    Problem(Brief) Date Time Intervention Physician contacted                                               Drip rates at handoff:    sodium chloride      sodium chloride Stopped (03/07/23 0534)       Hospital Course Daily Updates:  Admit Day# 0 3/6/23 nights  - 500 ML NS given bolus. Repeated lactate = 4.6  - 1900ml UO whole shift  - VSS ovenringht. No complaints of pain  - Redness + excoriation noted on left inguinal area.       Lab Data:   CBC:   Recent Labs     03/06/23  1310 03/07/23  0416   WBC 26.7* 25.6*   HGB 11.5* 10.3*   HCT 34.9* 31.6*   MCV 86.6 87.8   * 90*     BMP:    Recent Labs     03/06/23  1310 03/07/23  0416   * 142   K 3.6 3.9   CO2 20* 22   BUN 41* 26*   CREATININE 0.6 0.6     LIVR:   Recent Labs     03/06/23  1310   AST 35   ALT 29     PT/INR:   Recent Labs     03/06/23  1310   PROT 5.8*     APTT: No results for input(s): APTT in the last 72 hours. ABG: No results for input(s): PHART, OQL5IBS, PO2ART in the last 72 hours.   Consults (if GI or Nephrology- which group?)-

## 2023-03-07 NOTE — CARE COORDINATION
Chart reviewed, Dr Henrietta Rangel updated Chandrika Sen. Pt on 6L/HFNC, wears 3-4L at home typically, supplied by [facility]. Pulm, IM, HemeOnc involved. CM will continue to follow pt's progress and coordinate discharge arrangements as appropriate.   SOLITARIO Justice-FRANCISCO

## 2023-03-07 NOTE — CONSULTS
[facility]    Consult Note      Requesting Physician:  Dr. Richard Garcia:  NSCLC      HISTORY OF PRESENT ILLNESS:    Ms. Wilberto Matute  is a 79 y.o. female we are seeing in consultation for     ICD-10-CM    1. Septic shock (HCC)  A41.9     R65.21       2. Hypoxia  R09.02       3. Pneumonia due to infectious organism, unspecified laterality, unspecified part of lung  J18.9          This patient was recently diagnosed with a met adenocarcinoma of the LLL of the lung. She also had bilateral PEs and SARS-CoV-2. Relevant history is outlined below:    Met adenocarcinoma of the LLL of the lun. Admitted to [facility] between  - 2023. She was diagnosed with SARS-CoV-2 pneumonia, bilateral PEs, metastatic adenocarcinoma of the LLL of the lung. She was discharged on Dex 4 mg BID and Eliquis 5 mg BID. A. Admit labs:  i. WBC 17.8 K/mcL, HGB 12.9 g/dL, HCT 39.7%,  K/mcL, ANC 16.4 K/mcL, MCV 84.6 fL.  ii. Creat 0.8 mg/dL, Ca 10.4 mg/dL, alb 4.0 g/dL, t bili 0.9 mg/dL. B. Imaging:  i. CTPA, 2023, showed acute bilateral PEs, and a large mass centered on the left hilum with associated bilateral hilar and mediastinal lymphadenopathy. Hepatic mets were identified. A small left pleural effusion was present. ii. CT abd/pelvis, 2022, showed widespread hepatic metastases, widespread bony osseous metastatic disease, 6/6 cm cystic nodule in the pelvis.  iii. MRI brain, 2023, numerous punctate mets throughout the supratentorial and infratentorial parenchyma of the brain. The largest lesion was a 1.8 x 1.4 cm lesion in the medial right temporal lobe that was felt to be extra-axial and potentially meningioma. C. Diagnosis:  i. CT-guided biopsy of the left iliac bone, 2023, showed a metastatic moderately differentiated adenocarcinoma of pulmonary origin. Molecular testin. Renetta tumor profiling of the specimen collected on 2023 was ordered on 2023. In terms of next gen sequencing through [facility], that is pending. All parties are aware that we would like an interim result, but I did not receive one yesterday (3/06/2023). She was admitted to [facility] on 3/06/2022 with hypoxic respiratory failure.     Past Medical History:  Past Medical History:   Diagnosis Date    Asthma     Cancer [facility])        Past Surgical History:  Past Surgical History:   Procedure Laterality Date    CT BIOPSY PERCUTANEOUS DEEP BONE  2023    CT BIOPSY PERCUTANEOUS DEEP BONE 2023 Michael Bender MD [id number] [place] [address] CT SCAN       Current Medications:  Current Facility-Administered Medications   Medication Dose Route Frequency Provider Last Rate Last Admin    0.9 % sodium chloride IV bolus 434 mL  434 mL IntraVENous Once Dennys Garcia PA-C        sodium chloride flush 0.9 % injection 5-40 mL  5-40 mL IntraVENous 2 times per day Denette Curling, MD   10 mL at 23 2100    sodium chloride flush 0.9 % injection 5-40 mL  5-40 mL IntraVENous PRN Denette Curling, MD        0.9 % sodium chloride infusion   IntraVENous PRN Denette Curling, MD        ondansetron (ZOFRAN-ODT) disintegrating tablet 4 mg  4 mg Oral Q8H PRN Denette Curling, MD        Or    ondansetron (ZOFRAN) injection 4 mg  4 mg IntraVENous Q6H PRN Denette Curling, MD   4 mg at 23    polyethylene glycol (GLYCOLAX) packet 17 g  17 g Oral Daily PRN Denette Curling, MD        acetaminophen (TYLENOL) tablet 650 mg  650 mg Oral Q6H PRN Denette Curling, MD        Or    acetaminophen (TYLENOL) suppository 650 mg  650 mg Rectal Q6H PRN Denette Curling, MD        0.9 % sodium chloride infusion   IntraVENous Continuous Denette Curling,  mL/hr at 23 0234 New Bag at 23 0234    levoFLOXacin (LEVAQUIN) 750 MG/150ML infusion 750 mg  750 mg IntraVENous Q24H Karly Xiong MD        apixaban Ximena Alu) tablet 5 mg  5 mg Oral BID Karly Xiong MD   5 mg at 03/06/23 2100    atorvastatin (LIPITOR) tablet 40 mg  40 mg Oral Nightly Karly Xiong MD   40 mg at 03/06/23 2100    pantoprazole (PROTONIX) tablet 40 mg  40 mg Oral QAM AC Karly Xiong MD        traMADol (ULTRAM) tablet 50 mg  50 mg Oral Q6H PRN Karly Xiong MD        vancomycin (VANCOCIN) 1,250 mg in sodium chloride 0.9 % 250 mL IVPB  1,250 mg IntraVENous Q12H Karly Xiong .7 mL/hr at 03/07/23 0534 1,250 mg at 03/07/23 0534    0.9 % sodium chloride bolus  500 mL IntraVENous Once Irais Rodriguez .7 mL/hr at 03/06/23 2120 Rate Change at 03/06/23 2120       Allergies: Allergies   Allergen Reactions    Hydrocodone     Propoxyphene     Penicillin G Rash       Social History:  Social History     Socioeconomic History    Marital status:      Spouse name: Not on file    Number of children: Not on file    Years of education: Not on file    Highest education level: Not on file   Occupational History    Not on file   Tobacco Use    Smoking status: Never    Smokeless tobacco: Never   Vaping Use    Vaping Use: Never used   Substance and Sexual Activity    Alcohol use: Not Currently    Drug use: Never    Sexual activity: Not on file   Other Topics Concern    Not on file   Social History Narrative    Not on file     Social Determinants of Health     Financial Resource Strain: Not on file   Food Insecurity: Not on file   Transportation Needs: Not on file   Physical Activity: Not on file   Stress: Not on file   Social Connections: Not on file   Intimate Partner Violence: Not on file   Housing Stability: Not on file          Family History:  Family History   Problem Relation Age of Onset    Cancer Paternal Grandmother     Cancer Paternal Grandfather        REVIEW OF SYSTEMS:      Constitutional: Denies fever, sweats, weight loss  Eyes: No visual changes or diplopia. No scleral icterus. Ent: No headaches, hearing loss or vertigo. No mouth sores or sore throat. Cardiovascular: No chest pain, dyspnea on exertion, palpitations or loss of consciousness. Respiratory: No cough or wheezing, no sputum production. No hemoptysis. Gastrointestinal: No abdominal pain, appetite loss, blood in stools. No change in bowel habits. Genitourinary: No dysuria, trouble voiding, or hematuria. Musculoskeletal: No generalized weakness. No joint complaints. Integumentary: No rash or pruritus. Neurological: No headache, diplopia. No change in gait, balance, or coordination. No paresthesias. Endocrine: No temperature intolerance. No excessive thirst, fluid intake, urination. Hematologic/lymphatic: No abnormal bruising or ecchymosis, blood clots or swollen lymph nodes. Allergic/immunologic: No nasal congestion or hives.         PHYSICAL EXAM:      Vitals:  Patient Vitals for the past 24 hrs:   BP Temp Temp src Pulse Resp SpO2 Height Weight   03/07/23 0530 -- -- -- -- -- -- -- 174 lb 9.7 oz (79.2 kg)   03/07/23 0400 130/79 98.2 °F (36.8 °C) Oral (!) 126 21 -- -- --   03/07/23 0200 131/82 98.3 °F (36.8 °C) Oral (!) 118 16 93 % -- --   03/07/23 0000 129/71 98.2 °F (36.8 °C) Oral (!) 117 19 91 % -- --   03/06/23 2346 -- -- -- (!) 119 -- -- -- --   03/06/23 2200 115/77 98.2 °F (36.8 °C) Oral (!) 114 16 92 % -- --   03/06/23 2020 -- -- -- (!) 116 -- -- -- --   03/06/23 2000 122/72 98.2 °F (36.8 °C) Oral (!) 121 21 91 % -- --   03/06/23 1800 115/64 -- -- (!) 115 17 98 % -- --   03/06/23 1702 114/80 -- -- (!) 113 15 92 % -- --   03/06/23 1602 118/70 -- -- (!) 117 17 95 % -- --   03/06/23 1543 -- -- -- (!) 108 19 93 % -- --   03/06/23 1529 -- -- -- (!) 110 18 91 % -- --   03/06/23 1456 -- -- -- (!) 105 21 92 % -- --   03/06/23 1453 -- -- -- (!) 105 19 92 % -- --   03/06/23 1429 -- -- -- (!) 117 20 94 % -- --   03/06/23 1427 107/74 -- -- (!) 118 20 96 % -- --   03/06/23 1418 -- -- -- (!) 113 18 95 % -- --   03/06/23 1357 104/69 -- -- (!) 118 17 94 % -- --   03/06/23 1350 99/68 -- -- (!) 120 14 94 % -- --   03/06/23 1347 -- -- -- (!) 119 19 94 % -- --   03/06/23 1343 -- -- -- (!) 118 20 94 % -- --   03/06/23 1329 -- -- -- (!) 120 18 94 % -- --   03/06/23 1326 96/61 -- -- (!) 123 15 -- -- --   03/06/23 1322 -- -- -- (!) 120 19 92 % -- --   03/06/23 1318 94/63 -- -- (!) 119 19 91 % -- --   03/06/23 1315 -- -- -- -- -- 90 % -- --   03/06/23 1314 -- -- -- (!) 122 19 (!) 89 % -- --   03/06/23 1258 (!) 92/44 98.4 °F (36.9 °C) Oral (!) 124 24 (!) 87 % -- --   03/06/23 1253 -- -- -- (!) 124 22 (!) 86 % 5' 1\" (1.549 m) 176 lb 3.2 oz (79.9 kg)       Date 03/07/23 0000 - 03/07/23 2359   Shift 9680-4972 5378-7001 1092-7279 24 Hour Total   INTAKE   P.O.(mL/kg/hr) 680   680   Shift Total(mL/kg) 680(8.6)   680(8.6)   OUTPUT   Urine(mL/kg/hr) 1300   1300   Shift Total(mL/kg) 1300(16.4)   1300(16.4)   Weight (kg) 79.2 79.2 79.2 79.2       CONSTITUTIONAL: awake, alert, cooperative, no apparent distress   EYES: pupils equal, round and reactive to light, sclera clear and conjunctiva normal  ENT: Normocephalic, without obvious abnormality, atraumatic  NECK: supple, symmetrical, no jugular venous distension and no carotid bruits   HEMATOLOGIC/LYMPHATIC: no cervical, supraclavicular or axillary lymphadenopathy   LUNGS: no increased work of breathing and clear to auscultation   CARDIOVASCULAR: regular rate and rhythm, normal S1 and S2, no murmur noted  ABDOMEN: normal bowel sounds x 4, soft, non-distended, non-tender, no masses palpated, no hepatosplenomegaly   MUSCULOSKELETAL: full range of motion noted, tone is normal  NEUROLOGIC: awake, alert, oriented to name, place and time. Motor skills grossly intact. SKIN: Normal skin color, texture, turgor and no jaundice.  appears intact   EXTREMITIES: no LE edema       DATA:    PT/INR:    Recent Labs     03/06/23  1310 03/02/23  1106 02/20/23  0644 02/15/23  0601 02/13/23  1112   PROT 5.8* 6.8  --   --  7.2   INR  --   --  0.99 1.16*  --      PTT:    Recent Labs     02/13/23  1305   APTT 26.5       CMP:    Recent Labs     03/07/23  0416 03/06/23  1310    135*   K 3.9 3.6    98*   CO2 22 20*   BUN 26* 41*   PROT  --  5.8*     Mg:    Recent Labs     02/21/23  0448 02/20/23  0644 02/19/23  0629   MG 2.20 2.20 2.10       Lab Results   Component Value Date    CALCIUM 9.5 03/07/2023    PHOS 3.7 02/21/2023       CBC:    Recent Labs     03/07/23  0416 03/06/23  1310 03/02/23  1106 02/20/23  0749 02/17/23  0638 02/16/23  0637 02/15/23  0601   WBC 25.6* 26.7* 28.4* 16.7* 13.0* 14.2* 14.1*   NEUTROABS 22.3* 23.8*  --   --  9.9* 10.9* 11.2*   LYMPHOPCT 9.0 2.0  --   --  18.0 19.0 12.7   RBC 3.60* 4.03 4.79 4.30 4.00 3.66* 3.66*   HGB 10.3* 11.5* 13.3 11.6* 11.0* 10.3* 10.3*   HCT 31.6* 34.9* 41.8 37.0 34.3* 31.3* 31.4*   MCV 87.8 86.6 87.2 85.9 85.9 85.4 85.7   MCH 28.5 28.4 27.8 27.1 27.4 28.1 28.1   MCHC 32.5 32.8 31.9 31.5 32.0 32.9 32.7   RDW 20.3* 20.0* 19.7* 17.6* 17.1* 17.2* 17.4*   PLT 90* 115* 186 269 228 186 157        LDH:  Recent Labs     02/15/23  0601   *         Radiology Review:  CT CHEST PULMONARY EMBOLISM W CONTRAST  Narrative: EXAMINATION:  CTA OF THE CHEST 3/6/2023 2:25 pm    TECHNIQUE:  CTA of the chest was performed after the administration of 75 mL Isovue 370  intravenous contrast.  Multiplanar reformatted images are provided for  review. MIP images are provided for review. Automated exposure control,  iterative reconstruction, and/or weight based adjustment of the mA/kV was  utilized to reduce the radiation dose to as low as reasonably achievable. COMPARISON:  Correlation with concurrent chest radiograph, CT on 02/13/2023    HISTORY:  Recently diagnosed lung cancer. Acute hypoxia and hemoptysis. FINDINGS:  Image quality degraded by motion artifact. Pulmonary Arteries: Bilateral pulmonary emboli seen on prior CT have  resolved. No new filling defect seen. Main pulmonary artery is normal in  caliber. Mediastinum: Heart is not enlarged. Minimal pericardial effusion. Thoracic  aorta is normal caliber. Mediastinal and hilar lymphadenopathy seen on prior  CT has not significantly changed. Thyroid is unremarkable. Small hiatal  hernia with associated wall thickening. Lungs/pleura: The mass in the posterior left upper lobe extending into the  superior left lower lobe does not appear significantly changed from recent  CT. Diffuse ground-glass opacity throughout both lungs has increased. Minimal left pleural effusion has decreased. Upper Abdomen: Cholecystectomy. Scattered hepatic metastasis as seen on  recent CT. Soft Tissues/Bones: Widespread sclerotic osseous metastatic disease as seen  on recent imaging. Impression: 1. Acute bilateral pulmonary emboli seen on prior CT have resolved. 2. Diffuse ground-glass opacity throughout both lungs has increased and could  be due to atypical pneumonia. 3. Mass in the posterior left upper lobe extending into the superior left  lower lobe has not significantly changed and is consistent with known  malignancy. 4. Mediastinal and hilar lymphadenopathy has not significantly changed from  prior CT. 5. Diffuse osseous metastatic disease. 6. Partially visualized hepatic metastatic disease. XR CHEST PORTABLE  Narrative: EXAMINATION:  ONE XRAY VIEW OF THE CHEST    3/6/2023 1:17 pm    COMPARISON:  Chest x-ray dated 3 March 2023    HISTORY:  ORDERING SYSTEM PROVIDED HISTORY: SOB  TECHNOLOGIST PROVIDED HISTORY:  Reason for exam:->SOB  Reason for Exam: Shortness of Breath    FINDINGS:  Interval worsening of diffuse bilateral airspace disease. No pneumothorax or  pleural effusion. Prominence of the left superior hilum is stable. Impression: 1. Interval worsening of diffuse airspace opacities which could represent  edema or infection.     2.  Prominence of the left superior pulmonary hilum shown to represent a  probable carcinoma on the comparison CT chest from 13 February 2023      Problem List  Patient Active Problem List   Diagnosis    Acute respiratory failure (HCC)    Acute deep vein thrombosis (DVT) of proximal vein of left lower extremity (HCC)    Bilateral pulmonary embolism (HCC)    Elevated troponin    Deep venous thrombosis (HCC)    Pericardial effusion    Pleural effusion on left    Pneumonia due to COVID-19 virus    Mass of left lung    Mediastinal adenopathy    Hilar adenopathy    Metastasis to liver (HCC)    Severe sepsis (HCC)       IMPRESSION/RECOMMENDATIONS:    1.) 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 - This is occurring in a female nonsmoker.  - [facility] tumor profiling is pending.  - A CTPA on 3/06/2022 showed a stable left lung mas, stable mediastinal LAD, diffuse sclerotic bone mets, scattered liver mets. Overall this is stable. 2.) Brain mets  - An MRI of the brain on 2/21/2023 showed numerous punctate brain mets in the supratentorial and infratentorial parenchyma of the brain.    - On Dex 4 mg BID at home for this reason. 3.) Bilateral GGO  - SARS-CoV-2 PCR neg on 3/06/2023. - Influenza A/B neg on 2/06/2023. - Blood cultures collected on 3/06/2023 are in process. - On Levaquin, Vanco/ Azithro currently. - Now on 6 LPM by NC.    4.) Bilateral PEs  - A CTPA at the time of diagnosis on 2/13/2023 showed bilateral PEs.    - Treating with Eliquis. - a CTPA on 3/06/2023 showed resolution of pulmonary emboli. Thank you for asking me to see the patient.        Bud Harman MD  Please Contact Through [facility]

## 2023-03-07 NOTE — PROGRESS NOTES
Physician Progress Note      Renetta Pruett  CSN #:                  [id number]  :                       1955  ADMIT DATE:       3/6/2023 12:47 PM  [address] [place] DATE:  RESPONDING  PROVIDER #:        Haritha Vicente MD          QUERY TEXT:    Pt admitted with SOB and acute respiratory failure. Pt noted to have atypical   PNA. If possible, please document in the progress notes and discharge summary   if you are evaluating and/or treating any of the following:      Note: CAP and HCAP indicate where the pneumonia was acquired, not a specific   type. The medical record reflects the following:  Risk Factors: Age, CA  Clinical Indicators:  PN -  \"She met severe sepsis criteria -tachycardia, tachypnea, leukocytosis,   elevated lactic(5.7) and imaging consistent with atypical pneumonia. Flu/COVID testing in the ED negative. Received 30 mill per KG fluid bolus and   downtrended lactic.  -  Received empiric IV Rocephin/Levaquin in ED-broaden coverage to Vanco and   Levaquin (could not use cefepime/Zosyn given patient's allergy to   penicillins)\"  CT Chest -  \" Diffuse bilateral groundglass opacities. \"  Treatment: [place], IVF, serial labs, and supportive care    Thank you,  Ralph Browning, RN, BSN, CRCR  Options provided:  -- Gram negative pneumonia  -- Other - I will add my own diagnosis  -- Disagree - Not applicable / Not valid  -- Disagree - Clinically unable to determine / Unknown  -- Refer to Clinical Documentation Reviewer    PROVIDER RESPONSE TEXT:    This patient has gram negative pneumonia.     Query created by: Lucas Gaytan on 3/7/2023 2:04 PM      Electronically signed by:  Haritha Vicente MD 3/7/2023 4:57 PM

## 2023-03-07 NOTE — PROGRESS NOTES
Comprehensive Nutrition Assessment    Type and Reason for Visit:  Initial, Positive Nutrition Screen    Nutrition Recommendations/Plan:   Continue regular diet and encourage PO intake   RD to add boost pudding BID   Encourage intake of protein rich foods  Monitor nutrition adequacy, pertinent labs, bowel habits, wt changes, and clinical progress     Malnutrition Assessment:  Malnutrition Status:  Severe malnutrition (03/07/23 1133)    Context:  Acute Illness     Findings of the 6 clinical characteristics of malnutrition:  Energy Intake:  75% or less of estimated energy requirements for 7 or more days  Weight Loss:  Greater than 5% over 1 month     Body Fat Loss:  Mild body fat loss     Muscle Mass Loss: Moderate muscle mass loss Clavicles (pectoralis & deltoids), Temples (temporalis)    Nutrition Assessment:    Positive nutrition screen for wt loss and poor appetite: Pt recently diagnosed w/ metastatic lung cancer with spread to liver/bone/brain admitted w/ acute on chronic hypoxic respiratory failure with severe sepsis secondary to atypical pneumonia POA. Pt currently on regular diet. Pt reports fair appetite, ordering [employer] broth w/ meals. Reports good appetite PTA. Sig wt loss noted, pt was 191 lb on 02/13 and currently 174 lb, -8.8% wt change x 1 month. Pt does not like ensure but reports family bringing in Thrive protein drink, reports drinking it once daily. Pt willing to trial boost pudding BID, RD to add. Continue to encourage PO intake, will continue to monitor. Nutrition Related Findings:    Labs reviewed. No BM reported. Wound Type: None       Current Nutrition Intake & Therapies:    Average Meal Intake: 26-50%, 1-25%  Average Supplements Intake: None Ordered  ADULT DIET; Regular    Anthropometric Measures:  Height: 5' 1\" (154.9 cm)  Ideal Body Weight (IBW): 105 lbs (48 kg)       Current Body Weight: 174 lb (78.9 kg), 165.7 % IBW.  Weight Source: Bed Scale  Current BMI (kg/m2): 32.9  Usual Body Weight: 191 lb (86.6 kg) (on 2/14/23)  % Weight Change (Calculated): -8.9                 BMI Categories: Obese Class 1 (BMI 30.0-34. 9)    Estimated Daily Nutrient Needs:  Energy Requirements Based On: Kcal/kg (30-35 kcals/kg)  Weight Used for Energy Requirements: Ideal (48 kg)  Energy (kcal/day): 8405-1574  Weight Used for Protein Requirements: Ideal (48 kg, 1.2-1.4 g/kg)  Protein (g/day): 58-67 g  Method Used for Fluid Requirements: 1 ml/kcal  Fluid (ml/day): 1604-8390    Nutrition Diagnosis:   Severe malnutrition related to catabolic illness, inadequate protein-energy intake as evidenced by weight loss greater than or equal to 5% in 1 month, intake 0-25%, intake 26-50%    Nutrition Interventions:   Food and/or Nutrient Delivery: Continue Current Diet, Start Oral Nutrition Supplement  Nutrition Education/Counseling: Education not appropriate  Coordination of Nutrition Care: Continue to monitor while inpatient, Interdisciplinary Rounds       Goals:     Goals: PO intake 50% or greater, prior to discharge       Nutrition Monitoring and Evaluation:   Behavioral-Environmental Outcomes: None Identified  Food/Nutrient Intake Outcomes: Food and Nutrient Intake, Supplement Intake  Physical Signs/Symptoms Outcomes: Biochemical Data, Weight, Nutrition Focused Physical Findings    Discharge Planning:    Continue Oral Nutrition Supplement     Kyra Pappas 87, [address] [place] [address],   Contact: Office: [phone]; [address]: [address]

## 2023-03-07 NOTE — PROGRESS NOTES
Hospitalist Progress Note      PCP: YANN Werner - CNP    Date of Admission: 3/6/2023    Chief Complaint: Shortness of breath    Hospital Course: Reviewed H&P    Subjective: Patient seen and examined this morning. Reports feeling slightly better. Currently on 6 L/min oxygen by nasal cannula and saturating > 90%. No acute events reported overnight. Discussed with pulmonology (Dr. Nasim Yusuf) -plan to give 40 mg of IV Lasix x1 and discontinue IV fluids. Okay to transfer her out of ICU to PCU. Evaluated by oncology with agreement of current plan of care. Medications:  Reviewed    Infusion Medications    sodium chloride       Scheduled Medications    mupirocin   Each Nostril BID    miconazole   Topical BID    melatonin  5 mg Oral Nightly    0.9 % sodium chloride  434 mL IntraVENous Once    sodium chloride flush  5-40 mL IntraVENous 2 times per day    levofloxacin  750 mg IntraVENous Q24H    apixaban  5 mg Oral BID    atorvastatin  40 mg Oral Nightly    pantoprazole  40 mg Oral QAM AC    vancomycin  1,250 mg IntraVENous Q12H    sodium chloride  500 mL IntraVENous Once     PRN Meds: potassium chloride **OR** potassium alternative oral replacement **OR** potassium chloride, sodium chloride flush, sodium chloride, ondansetron **OR** ondansetron, polyethylene glycol, acetaminophen **OR** acetaminophen, traMADol      Intake/Output Summary (Last 24 hours) at 3/7/2023 1155  Last data filed at 3/7/2023 0636  Gross per 24 hour   Intake 3332.78 ml   Output 1900 ml   Net 1432.78 ml     Physical Exam Performed:  BP (!) 146/88   Pulse (!) 136   Temp 98.2 °F (36.8 °C) (Oral)   Resp 20   Ht 5' 1\" (1.549 m)   Wt 174 lb 9.7 oz (79.2 kg)   SpO2 93%   BMI 32.99 kg/m²     General appearance: No apparent distress, appears stated age and cooperative. HEENT:  Normal cephalic, atraumatic without obvious deformity. Pupils equal, round, and reactive to light. Extra ocular muscles intact.  Conjunctivae/corneas clear.  Neck: Supple, with full range of motion. No jugular venous distention. Trachea midline. Respiratory: Increased respiratory effort. Accessory respiratory muscle usage noted, diminished breath sounds at bases bilaterally with occasional  Rales  Cardiovascular:  Regular rate and rhythm with normal S1/S2 without murmurs, rubs or gallops. Abdomen: Soft, non-tender, non-distended with normal bowel sounds. Musculoskeletal:  No clubbing, cyanosis or edema bilaterally. Full range of motion without deformity. Skin: Skin color, texture, turgor normal.  No rashes or lesions. Neurologic:  Neurovascularly intact without any focal sensory/motor deficits. Cranial nerves: II-XII intact, grossly non-focal.  Psychiatric:  Alert and oriented, thought content appropriate, normal insight  Capillary Refill: Brisk,< 3 seconds   Peripheral Pulses: +2 palpable, equal bilaterally       Labs:   Recent Labs     03/06/23  1310 03/07/23  0416   WBC 26.7* 25.6*   HGB 11.5* 10.3*   HCT 34.9* 31.6*   * 90*     Recent Labs     03/06/23  1310 03/07/23  0416   * 142   K 3.6 3.9   CL 98* 107   CO2 20* 22   BUN 41* 26*   CREATININE 0.6 0.6   CALCIUM 9.9 9.5     Recent Labs     03/06/23  1310   AST 35   ALT 29   BILITOT 0.9   ALKPHOS 377*     Recent Labs     03/06/23  1310 03/06/23  1938 03/06/23  2237   TROPONINI 0.05* 0.05* 0.02*     Radiology:  CT CHEST PULMONARY EMBOLISM W CONTRAST   Final Result   1. Acute bilateral pulmonary emboli seen on prior CT have resolved. 2. Diffuse ground-glass opacity throughout both lungs has increased and could   be due to atypical pneumonia. 3. Mass in the posterior left upper lobe extending into the superior left   lower lobe has not significantly changed and is consistent with known   malignancy. 4. Mediastinal and hilar lymphadenopathy has not significantly changed from   prior CT. 5. Diffuse osseous metastatic disease. 6. Partially visualized hepatic metastatic disease. XR CHEST PORTABLE   Final Result   1. Interval worsening of diffuse airspace opacities which could represent   edema or infection. 2.  Prominence of the left superior pulmonary hilum shown to represent a   probable carcinoma on the comparison CT chest from 13 February 2023           Consults:  IP CONSULT TO HOSPITALIST  IP CONSULT TO PULMONOLOGY  IP CONSULT TO [facility]    Diagnosis Date Noted    Severe malnutrition ([address] [E43] 03/07/2023     Priority: Medium    Severe sepsis ([address] [A41.9, R65.20] 03/06/2023     Priority: Medium     Assessment/Plan:  Acute on chronic hypoxic respiratory failure with severe sepsis secondary to atypical pneumonia POA  -51-year-old female recently diagnosed (2/12/2023 )with metastatic lung cancer with spread to liver/bone/brain and with chronic hypoxic respiratory failure 4 L/min p/w worsening shortness of breath. CTPA showed resolution of her bilateral PE but multifocal bilateral infiltrates c/w atypical pneumonia.   -She met severe sepsis criteria -tachycardia, tachypnea, leukocytosis, elevated lactic(5.7) and imaging consistent with atypical pneumonia. Flu/COVID testing in the ED negative. Received 30 mill per KG fluid bolus and downtrended lactic.   -  Received empiric IV Rocephin/Levaquin in ED-broaden coverage to Vanco and Levaquin (could not use cefepime/Zosyn given patient's allergy to penicillins)   -Patient currently requiring 8 L/min HFNC. Admitted to ICU overnight for closer monitoring. Received IV hydration and antibiotics  -Request pulmonology assistance with management. Evaluated and discontinued IV fluids and ordered 1 dose of IV Lasix  40 mg. Monitor I's/O  - Had [address] conversation with patient and her  at bedside in ED -patient discussed with other family members and wishes to remain full code at this time.     2.  Metastatic lung cancer(adenocarcinoma) with spread to liver, bone, brain -as per work-up during last admission (2/13/23- 2/22/23)   - Patient follows with Dr. Mena Horvath and Dr. Estrada Carrizales but hasn't initiated on Rx yet . Awaiting caris mutation . Consulted oncology -evaluated and agreed with current management. 3.  Recently diagnosed with PE -on Eliquis at home; continue  -CTPA on admission showed resolution     DVT Prophylaxis: Eliquis   Diet: ADULT DIET; Regular  ADULT ORAL NUTRITION SUPPLEMENT; Lunch, Dinner; Fortified Pudding Oral Supplement  Code Status: Full Code    PT/OT Eval Status: Not yet ordered     Dispo - Likely 2-3 days pending Clinical improvement . Ok to transfer out of ICU to PCU      Appropriate for A1 Discharge Unit: No     The note was completed using Dragon -speech recognition software & EMR  . Every effort was made to ensure accuracy; however, inadvertent computerized transcription errors may be present.     Mainor Polanco MD

## 2023-03-07 NOTE — PROGRESS NOTES
Pulmonary & Critical Care Medicine ICU Progress Note      Events of Last 24 hours:   Pt still having cough with bloody sputum. She desaturated to 70's when ambulating. Invasive Lines: PICC D#None   CVC D#None  Art Line D#None            Vitals:  BP (!) 145/78   Pulse (!) 117   Temp 98.2 °F (36.8 °C) (Oral)   Resp 19   Ht 5' 1\" (1.549 m)   Wt 174 lb 9.7 oz (79.2 kg)   SpO2 92%   BMI 32.99 kg/m²    Tmax:  CVP:        Intake/Output Summary (Last 24 hours) at 3/7/2023 7115  Last data filed at 3/7/2023 0636  Gross per 24 hour   Intake 3332.78 ml   Output 1900 ml   Net 1432.78 ml       EXAM:  Physical Exam  Constitutional:       General: She is not in acute distress. Appearance: She is obese. She is not toxic-appearing. HENT:      Head: Normocephalic and atraumatic. Nose: Nose normal.      Mouth/Throat:      Pharynx: No oropharyngeal exudate. Eyes:      General: No scleral icterus. Right eye: No discharge. Left eye: No discharge. Cardiovascular:      Rate and Rhythm: Regular rhythm. Tachycardia present. Heart sounds: No murmur heard. Pulmonary:      Breath sounds: No wheezing or rales. Comments: Tachypneic  Abdominal:      General: Abdomen is flat. Bowel sounds are normal. There is no distension. Tenderness: There is no abdominal tenderness. Musculoskeletal:         General: No swelling. Cervical back: Normal range of motion. Skin:     General: Skin is warm and dry. Neurological:      Mental Status: She is alert and oriented to person, place, and time.    Psychiatric:         Mood and Affect: Mood normal.        Medications:  Scheduled Meds:   0.9 % sodium chloride  434 mL IntraVENous Once    sodium chloride flush  5-40 mL IntraVENous 2 times per day    levofloxacin  750 mg IntraVENous Q24H    apixaban  5 mg Oral BID    atorvastatin  40 mg Oral Nightly    pantoprazole  40 mg Oral QAM AC    vancomycin  1,250 mg IntraVENous Q12H    sodium chloride  500 mL IntraVENous Once       PRN Meds:  sodium chloride flush, sodium chloride, ondansetron **OR** ondansetron, polyethylene glycol, acetaminophen **OR** acetaminophen, traMADol    Results:  CBC:   Recent Labs     03/06/23 1310 03/07/23 0416   WBC 26.7* 25.6*   HGB 11.5* 10.3*   HCT 34.9* 31.6*   MCV 86.6 87.8   * 90*     BMP:   Recent Labs     03/06/23 1310 03/07/23 0416   * 142   K 3.6 3.9   CL 98* 107   CO2 20* 22   BUN 41* 26*   CREATININE 0.6 0.6     LIVER PROFILE:   Recent Labs     03/06/23 1310   AST 35   ALT 29   BILITOT 0.9   ALKPHOS 377*     PT/INR: No results for input(s): PROTIME, INR in the last 72 hours. APTT: No results for input(s): APTT in the last 72 hours. UA:No results for input(s): NITRITE, COLORU, PHUR, LABCAST, WBCUA, RBCUA, MUCUS, TRICHOMONAS, YEAST, BACTERIA, CLARITYU, SPECGRAV, LEUKOCYTESUR, UROBILINOGEN, BILIRUBINUR, BLOODU, GLUCOSEU, AMORPHOUS in the last 72 hours. Invalid input(s): Neri Rosado    Cultures:  Blood Culture 2 [[id number]] Collected: 03/06/23 1449   Order Status: Sent Specimen: Blood Updated: 03/07/23 0220   Blood Culture 1 [[id number]] Collected: 03/06/23 1310   Order Status: Sent Specimen: Blood Updated: 03/07/23 0220   COVID-19 & Influenza Combo [[id number]] Collected: 03/06/23 1319   Order Status: Completed Specimen: Nasopharyngeal Swab Updated: 03/06/23 1447    SARS-CoV-2 RNA, RT PCR NOT DETECTED    Comment: Not Detected results do not preclude SARS-CoV-2 infection and   should not be used as the sole basis for patient management   decisions. Results must be combined with clinical observations,   patient history, and epidemiological information. Testing was performed using [employer] SARS-CoV-2 and Influenza A/B   nucleic acid assay.  This test is a multiplex Real-Time Reverse   Transcriptase Polymerase Chain Reaction (RT-PCR)-based in vitro   diagnostic test intended for the qualitative detection of nucleic   acids from SARS-CoV-2, influenza A, and influenza B in nasopharyngeal   and nasal swab specimens for use under the FDAs Emergency Use   Authorization (EUA) only. Patient Fact Sheet:  [employer]   Provider Fact Sheet: [employer]Drives.pl   EUA: FindDrives.pl   IFU: [employer].pl     Methodology:  RT-PCR        INFLUENZA A NOT DETECTED    INFLUENZA B NOT DETECTED       Films:  CT PE 3/6/2023  No pericardial effusion. Trace left pleural effusion. Mediastinal adenopathy. Left upper lobe mass. Diffuse bilateral groundglass opacities. ASSESSMENT:  Acute on chronic hypoxic respiratory failure  Lung adenocarcinoma stage IV  Obesity  Tachycardia  Mediastinal adenopathy  Bilateral PE  DVT  Leukocytosis  Thrombocytopenia  Anion gap metabolic acidosis  Lactic acidosis     Plan:   - AOx3, answering questions appropriately. Will restart home Melatonin nightly  - Fluid resuscitated, not requiring pressors.   - Repeat lactic acid this morning  - cont HFNC with goal saturation 88-92%, she is currently on 5L high-flow and desaturates with ambulation. Will give dose of lasix due to high right-sided pressures at last admission. She has trace edema on exam.  - Strict I&O's, daily wt  - CTPE with no PE, diffuse b/l GGO's. Much worse ground-glass opacities as comparison to prior. Could have atypical PNA with ground-glass opacities, cont broad spectrum given recent hospitalization. - cont Vanc/Levaquin, blood cx pending  - Regular diet  - Replace electrolytes PRN  - cont Eliquis for prior PE/ DVT ppx  - SSl for hyglycmia, keep blood glucose 140-180    Pt can be transferred to step-down unit. Pulmonary will continue to follow patient.      Ppx/Obrien/Lines  - No obrien  - PIV  - Eliquis for ppx    Critical care time spent reviewing labs/films, examining patient, collaborating with other physicians but excluding procedures for life threatening organ failure is 40 minutes.         Electronically signed by:  Ryan Sheikh MD    3/7/2023    7:14 AM.

## 2023-03-08 PROBLEM — J96.01 ACUTE RESPIRATORY FAILURE WITH HYPOXIA (HCC): Status: ACTIVE | Noted: 2023-02-13

## 2023-03-08 PROBLEM — Z86.711 HISTORY OF PULMONARY EMBOLISM: Status: ACTIVE | Noted: 2023-03-08

## 2023-03-08 PROBLEM — D69.6 THROMBOCYTOPENIA (HCC): Status: ACTIVE | Noted: 2023-03-08

## 2023-03-08 PROBLEM — R91.8 PULMONARY INFILTRATES: Status: ACTIVE | Noted: 2023-03-08

## 2023-03-08 PROBLEM — R74.8 ELEVATED ALKALINE PHOSPHATASE LEVEL: Status: ACTIVE | Noted: 2023-03-08

## 2023-03-08 PROBLEM — E87.20 LACTIC ACIDOSIS: Status: ACTIVE | Noted: 2023-03-08

## 2023-03-08 PROBLEM — D72.829 LEUKOCYTOSIS: Status: ACTIVE | Noted: 2023-03-08

## 2023-03-08 PROBLEM — C79.9 METASTATIC ADENOCARCINOMA (HCC): Status: ACTIVE | Noted: 2023-03-08

## 2023-03-08 LAB
ANION GAP SERPL CALCULATED.3IONS-SCNC: 15 MMOL/L (ref 3–16)
BASOPHILS ABSOLUTE: 0 K/UL (ref 0–0.2)
BASOPHILS RELATIVE PERCENT: 0 %
BUN BLDV-MCNC: 24 MG/DL (ref 7–20)
CALCIUM SERPL-MCNC: 9.7 MG/DL (ref 8.3–10.6)
CHLORIDE BLD-SCNC: 108 MMOL/L (ref 99–110)
CO2: 20 MMOL/L (ref 21–32)
CREAT SERPL-MCNC: 0.6 MG/DL (ref 0.6–1.2)
EOSINOPHILS ABSOLUTE: 0.9 K/UL (ref 0–0.6)
EOSINOPHILS RELATIVE PERCENT: 4 %
GFR SERPL CREATININE-BSD FRML MDRD: >60 ML/MIN/{1.73_M2}
GLUCOSE BLD-MCNC: 115 MG/DL (ref 70–99)
HCT VFR BLD CALC: 32.6 % (ref 36–48)
HEMOGLOBIN: 10.4 G/DL (ref 12–16)
LYMPHOCYTES ABSOLUTE: 3.3 K/UL (ref 1–5.1)
LYMPHOCYTES RELATIVE PERCENT: 14 %
MACROCYTES: ABNORMAL
MCH RBC QN AUTO: 28.6 PG (ref 26–34)
MCHC RBC AUTO-ENTMCNC: 31.9 G/DL (ref 31–36)
MCV RBC AUTO: 89.6 FL (ref 80–100)
METAMYELOCYTES RELATIVE PERCENT: 3 %
MICROCYTES: ABNORMAL
MONOCYTES ABSOLUTE: 0.7 K/UL (ref 0–1.3)
MONOCYTES RELATIVE PERCENT: 3 %
NEUTROPHILS ABSOLUTE: 18.4 K/UL (ref 1.7–7.7)
NEUTROPHILS RELATIVE PERCENT: 76 %
OVALOCYTES: ABNORMAL
PDW BLD-RTO: 20.9 % (ref 12.4–15.4)
PLATELET # BLD: 72 K/UL (ref 135–450)
PLATELET SLIDE REVIEW: ABNORMAL
PMV BLD AUTO: 9.6 FL (ref 5–10.5)
POLYCHROMASIA: ABNORMAL
POTASSIUM REFLEX MAGNESIUM: 3.7 MMOL/L (ref 3.5–5.1)
RBC # BLD: 3.64 M/UL (ref 4–5.2)
SLIDE REVIEW: ABNORMAL
SODIUM BLD-SCNC: 143 MMOL/L (ref 136–145)
TEAR DROP CELLS: ABNORMAL
VANCOMYCIN RANDOM: 13 UG/ML
WBC # BLD: 23.3 K/UL (ref 4–11)

## 2023-03-08 PROCEDURE — 6370000000 HC RX 637 (ALT 250 FOR IP)

## 2023-03-08 PROCEDURE — 80202 ASSAY OF VANCOMYCIN: CPT

## 2023-03-08 PROCEDURE — 6370000000 HC RX 637 (ALT 250 FOR IP): Performed by: INTERNAL MEDICINE

## 2023-03-08 PROCEDURE — 2060000000 HC ICU INTERMEDIATE R&B

## 2023-03-08 PROCEDURE — 36415 COLL VENOUS BLD VENIPUNCTURE: CPT

## 2023-03-08 PROCEDURE — 6360000002 HC RX W HCPCS: Performed by: NURSE PRACTITIONER

## 2023-03-08 PROCEDURE — 6360000002 HC RX W HCPCS: Performed by: INTERNAL MEDICINE

## 2023-03-08 PROCEDURE — 2580000003 HC RX 258: Performed by: INTERNAL MEDICINE

## 2023-03-08 PROCEDURE — 85025 COMPLETE CBC W/AUTO DIFF WBC: CPT

## 2023-03-08 PROCEDURE — 94761 N-INVAS EAR/PLS OXIMETRY MLT: CPT

## 2023-03-08 PROCEDURE — 2700000000 HC OXYGEN THERAPY PER DAY

## 2023-03-08 PROCEDURE — 99233 SBSQ HOSP IP/OBS HIGH 50: CPT | Performed by: INTERNAL MEDICINE

## 2023-03-08 PROCEDURE — 80048 BASIC METABOLIC PNL TOTAL CA: CPT

## 2023-03-08 RX ORDER — LORAZEPAM 2 MG/ML
0.5 INJECTION INTRAMUSCULAR ONCE
Status: COMPLETED | OUTPATIENT
Start: 2023-03-08 | End: 2023-03-08

## 2023-03-08 RX ORDER — DEXAMETHASONE 4 MG/1
4 TABLET ORAL EVERY 12 HOURS SCHEDULED
Status: DISCONTINUED | OUTPATIENT
Start: 2023-03-08 | End: 2023-03-09 | Stop reason: HOSPADM

## 2023-03-08 RX ADMIN — Medication 10 ML: at 08:33

## 2023-03-08 RX ADMIN — PANTOPRAZOLE SODIUM 40 MG: 40 TABLET, DELAYED RELEASE ORAL at 08:32

## 2023-03-08 RX ADMIN — MICONAZOLE NITRATE: 2 POWDER TOPICAL at 08:33

## 2023-03-08 RX ADMIN — Medication 5 MG: at 20:20

## 2023-03-08 RX ADMIN — LEVOFLOXACIN 750 MG: 5 INJECTION, SOLUTION INTRAVENOUS at 08:37

## 2023-03-08 RX ADMIN — THYROID, PORCINE 30 MG: 30 TABLET ORAL at 20:22

## 2023-03-08 RX ADMIN — Medication 10 ML: at 20:25

## 2023-03-08 RX ADMIN — TRAMADOL HYDROCHLORIDE 50 MG: 50 TABLET ORAL at 20:20

## 2023-03-08 RX ADMIN — VANCOMYCIN HYDROCHLORIDE 1000 MG: 1 INJECTION, POWDER, LYOPHILIZED, FOR SOLUTION INTRAVENOUS at 20:29

## 2023-03-08 RX ADMIN — DEXAMETHASONE 4 MG: 4 TABLET ORAL at 20:20

## 2023-03-08 RX ADMIN — VANCOMYCIN HYDROCHLORIDE 1000 MG: 1 INJECTION, POWDER, LYOPHILIZED, FOR SOLUTION INTRAVENOUS at 10:28

## 2023-03-08 RX ADMIN — DEXAMETHASONE 4 MG: 4 TABLET ORAL at 08:32

## 2023-03-08 RX ADMIN — APIXABAN 5 MG: 5 TABLET, FILM COATED ORAL at 20:20

## 2023-03-08 RX ADMIN — LORAZEPAM 0.5 MG: 2 INJECTION INTRAMUSCULAR; INTRAVENOUS at 21:32

## 2023-03-08 RX ADMIN — ATORVASTATIN CALCIUM 40 MG: 40 TABLET, FILM COATED ORAL at 20:20

## 2023-03-08 RX ADMIN — APIXABAN 5 MG: 5 TABLET, FILM COATED ORAL at 08:32

## 2023-03-08 RX ADMIN — TRAMADOL HYDROCHLORIDE 50 MG: 50 TABLET ORAL at 00:24

## 2023-03-08 RX ADMIN — MICONAZOLE NITRATE: 2 POWDER TOPICAL at 20:20

## 2023-03-08 ASSESSMENT — PAIN SCALES - GENERAL
PAINLEVEL_OUTOF10: 3
PAINLEVEL_OUTOF10: 5
PAINLEVEL_OUTOF10: 0
PAINLEVEL_OUTOF10: 3
PAINLEVEL_OUTOF10: 8

## 2023-03-08 ASSESSMENT — PAIN DESCRIPTION - ORIENTATION: ORIENTATION: LEFT

## 2023-03-08 ASSESSMENT — PAIN - FUNCTIONAL ASSESSMENT: PAIN_FUNCTIONAL_ASSESSMENT: PREVENTS OR INTERFERES SOME ACTIVE ACTIVITIES AND ADLS

## 2023-03-08 ASSESSMENT — PAIN DESCRIPTION - LOCATION: LOCATION: RIB CAGE;BUTTOCKS

## 2023-03-08 ASSESSMENT — PAIN DESCRIPTION - DESCRIPTORS: DESCRIPTORS: ACHING

## 2023-03-08 NOTE — PROGRESS NOTES
INPATIENT PULMONARY CRITICAL CARE PROGRESS NOTE      Reason for visit    hypoxia. SUBJECTIVE: Patient when seen this morning states that she is emotionally compromised because patient was told that she has a stage IV lung cancer which will not benefit from immunotherapy and patient not be able to tolerate the cytotoxic chemotherapy and not much can be done about her cancer; patient continues to be hypoxemic and patient was on 15 L of high flow oxygen with saturation of 88 to 91% when seen, patient has sinus tachycardia on the monitor, patient was not have any chest pain or palpitations, patient was afebrile and hemodynamically maintained, patient was having good urine output overnight, patient was not have any chest pain when seen, patient's glycemic control was acceptable, no other pertinent review of system of concern       Physical Exam:  Blood pressure 129/82, pulse (!) 137, temperature 98 °F (36.7 °C), temperature source Oral, resp. rate 18, height 5' 1\" (1.549 m), weight 177 lb 4 oz (80.4 kg), SpO2 (!) 88 %.'   Constitutional:  No acute distress. HENT:  Oropharynx is clear and moist. No thyromegaly. Eyes:  Conjunctivae are normal. Pupils equal, round, and reactive to light. No scleral icterus. Neck: . No tracheal deviation present. No obvious thyroid mass. Cardiovascular: Normal rate, regular rhythm, normal heart sounds. No right ventricular heave. No lower extremity edema. Pulmonary/Chest: No wheezes. Bilateral rales. Chest wall is not dull to percussion. No accessory muscle usage or stridor. Abdominal: Soft. Bowel sounds present. No distension or hernia. No tenderness. Musculoskeletal: No cyanosis. No clubbing. No obvious joint deformity. Lymphadenopathy: No cervical or supraclavicular adenopathy. Skin: Skin is warm and dry. No rash or nodules on the exposed extremities. Psychiatric: Normal mood and affect appropriate for the situation. Behavior is normal.  Some anxiety. Neurologic: Alert, awake and oriented. PERRL. Speech fluent        Results:  CBC:   Recent Labs     03/06/23  1310 03/07/23  0416 03/08/23  0516   WBC 26.7* 25.6* 23.3*   HGB 11.5* 10.3* 10.4*   HCT 34.9* 31.6* 32.6*   MCV 86.6 87.8 89.6   * 90* 72*     BMP:   Recent Labs     03/06/23  1310 03/07/23  0416 03/08/23  0516   * 142 143   K 3.6 3.9 3.7   CL 98* 107 108   CO2 20* 22 20*   BUN 41* 26* 24*   CREATININE 0.6 0.6 0.6     LIVER PROFILE:   Recent Labs     03/06/23  1310   AST 35   ALT 29   BILITOT 0.9   ALKPHOS 377*       Imaging:  I have reviewed radiology images personally. CT CHEST PULMONARY EMBOLISM W CONTRAST   Final Result   1. Acute bilateral pulmonary emboli seen on prior CT have resolved. 2. Diffuse ground-glass opacity throughout both lungs has increased and could   be due to atypical pneumonia. 3. Mass in the posterior left upper lobe extending into the superior left   lower lobe has not significantly changed and is consistent with known   malignancy. 4. Mediastinal and hilar lymphadenopathy has not significantly changed from   prior CT. 5. Diffuse osseous metastatic disease. 6. Partially visualized hepatic metastatic disease. XR CHEST PORTABLE   Final Result   1. Interval worsening of diffuse airspace opacities which could represent   edema or infection.       2.  Prominence of the left superior pulmonary hilum shown to represent a   probable carcinoma on the comparison CT chest from 13 February 2023           XR CHEST (2 VW)    Result Date: 3/3/2023  EXAMINATION: TWO XRAY VIEWS OF THE CHEST 3/3/2023 2:57 pm COMPARISON: 02/16/2023 HISTORY: ORDERING SYSTEM PROVIDED HISTORY: Congestive heart failure, unspecified HF chronicity, unspecified heart failure type [facility]) TECHNOLOGIST PROVIDED HISTORY: Reason for Exam: hx of covid, pna, and blood clots, sob for 2 months, been on 1.5 liters of oxygen when resting and 3  liters when walking, since Feb 13th FINDINGS: Cardiomediastinal silhouette stable. Bilateral pulmonary opacities. No pneumothorax. No pleural effusion. Bilateral pulmonary opacities similar to previous exam could represent edema or infection     CT GUIDED NEEDLE PLACEMENT    Result Date: 2/20/2023  PROCEDURE: CT GUIDED CORE NEEDLE BONE BIOPSY OF THE LEFT ILIAC BONE. MODERATE CONSCIOUS SEDATION 2/20/2023 HISTORY: ORDERING SYSTEM PROVIDED HISTORY: Likely mets to lung, spoke to Dr. Autumn Carrington, he has reviewed images. Will stop heaprin for 12 hours TECHNOLOGIST PROVIDED HISTORY: Reason for exam:->Likely mets to lung, spoke to Dr. Autumn Carrington, he has reviewed images. Will stop heaprin for 12 hours Reason for Exam: metastatic disease SEDATION: Moderate sedation was ordered and supervised by the attending with physician face-to-face monitoring. Medications were provided and recorded by Radiology nurses. Moderate conscious sedation was administered for 7 minutes TECHNIQUE: Informed consent was obtained following a detailed explanation of the procedure including risks, benefits, and alternatives. Universal protocol was followed. The patient was placed on the CT table in a prone position. Sterile gowns, masks, hats and gloves utilized for maximal sterile barrier. Axial images were obtained through the iliac bones using CT guidance and a suitable skin site was prepped and draped in sterile fashion. Local anesthesia was achieved with lidocaine. An 11 gauge [employer] bone marrow biopsy needle was advanced into the left and a core biopsy specimen was obtained and the patient tolerated the procedure well. EBL: Minimal, less than 5 cc Dose modulation, iterative reconstruction, and/or weight based adjustment of the mA/kV was utilized to reduce the radiation dose to as low as reasonably achievable. Automated exposure control, iterative reconstruction, and/or weight based adjustment of the mA/kV was utilized to reduce the radiation dose to as low as reasonably achievable. Successful CT guided core biopsy of the iliac bone. CT ABDOMEN PELVIS W IV CONTRAST Additional Contrast? None    Result Date: 2/20/2023  EXAMINATION: CT OF THE ABDOMEN AND PELVIS WITH CONTRAST 2/20/2023 8:36 am TECHNIQUE: CT of the abdomen and pelvis was performed with the administration of intravenous contrast. Multiplanar reformatted images are provided for review. Automated exposure control, iterative reconstruction, and/or weight based adjustment of the mA/kV was utilized to reduce the radiation dose to as low as reasonably achievable. COMPARISON: None. HISTORY: ORDERING SYSTEM PROVIDED HISTORY: evaluate for liver biopsy TECHNOLOGIST PROVIDED HISTORY: Reason for exam:->evaluate for liver biopsy Additional Contrast?->None Reason for Exam: poss metastatic ca, eval liver FINDINGS: Lower Chest: Moderate left-sided pleural effusion is seen. Patchy ground-glass opacity and parenchymal nodularity seen in left lung base Patchy parenchymal nodularity seen in the right lung base. Septal thickening seen right lung base Small hiatal hernia seen. There is nonspecific thickening at the GE junction. Organs: No splenomegaly. Right adrenal gland is normal.  Left adrenal gland is normal. No hydronephrosis on the right or left No pancreatic calcification. No peripancreatic fluid There are clips from prior cholecystectomy There is subtle lobular contour throughout the liver. There is trace intrahepatic biliary ductal dilatation Scattered ill-defined hypodense nodules are seen throughout the liver, involving both the right and left hepatic lobes. There is a dominant peripheral nodule in the right lobe of the liver, measuring 3.1 cm x 2.3 cm as seen on image number 47 GI/Bowel: No significant small bowel distention noted. Mild stool load seen in colon. Scattered colonic diverticula are seen. No bowel obstruction noted. Pelvis: There is a dominant cystic nodule seen in the pelvis, measuring 6.6 cm x 6.5 cm.   No free fluid in pelvis. No pelvic adenopathy. Calcifications are seen in the pelvis, compatible with phleboliths. Peritoneum/Retroperitoneum: No retroperitoneal adenopathy. Atherosclerotic change seen in aorta. Atherosclerotic change seen in iliacs. Bones/Soft Tissues: Widespread sclerotic lesions are seen throughout the pelvic bones, and vertebral bodies. Widespread hepatic metastasis. Widespread bony osseous metastatic disease Dominant cystic nodule in the pelvis. This is not clearly benign. Consider pelvic ultrasound to rule out any complex internal features. Pleural-parenchymal disease at the lung bases. Please see recent chest CT report     VL Extremity Venous Left    Result Date: 2/13/2023  Lower Extremities DVT Study  Demographics   Patient Name      Beto Page   Date of Study     02/13/2023          Gender              Female   Patient Number    [id number]          Date of Birth       1955   Visit Number      [id number]           Age                 79 year(s)   Accession Number  [id number]          Room Number         07   Corporate ID      [id number]           [address] [facility], [place],                                                            [address] [place] [address]   Ordering          [place], [place]  Interpreting        [facility]  Physician                             Physician           Readers                                                            Terri Garcia MD  Procedure Type of Study:   Veins:Lower Extremities DVT Study, VL EXTREMITY VENOUS DUPLEX LEFT. Vascular Sonographer Report  Indications for Study:Swelling and Leg pain. Additional Indications:Patient scanned bedside in ED with complaints of left lower extremity swelling for 1 week with shortness of breath. Patient noted pain behind knee over the past two days. Recent travel where patient was sedentary for over 8 hours.  Venous Duplex Scan: B-mode imaging of the deep and superficial veins, with compression maneuvers, including color and Doppler spectral waveform analysis. Impressions Right Impression There is no evidence of deep venous thrombosis involving the right common femoral vein. Left Impression Acute totally occluding deep vein thrombosis involving the left femoral vein at the distal thigh, popliteal vein, gastrocnemius veins (4 of 4), posterior tibial, peroneal and soleal veins. No other evidence of deep vein or superficial vein thrombosis involving the left lower extremity. There is no previous exam for comparison. Conclusions   Summary   Extensive acute deep vein thrombosis involving the left femoral vein,  popliteal vein, gastrocnemius veins, posterior tibial, peroneal and soleal  veins. Signature   ------------------------------------------------------------------  Electronically signed by Luz Leija MD (Interpreting  physician) on 02/13/2023 at 04:27 PM  ------------------------------------------------------------------  Patient Status:ER. Study [facility] Forman [facility] - Vascular Lab. Technical Quality:Adequate visualization.   - Results were reported to:Wilberto Seymour RN @ 12:23pm on 2/13/23. Velocities are measured in cm/s ; Diameters are measured in mm Right Lower Extremities DVT Study Measurements Right 2D Measurements +--------------+----------+---------------+----------+ ! Location      ! Visualized! Compressibility! Thrombosis! +--------------+----------+---------------+----------+ ! Common Femoral!Yes       ! Yes            ! None      ! +--------------+----------+---------------+----------+ Right Doppler Measurements +--------------+------+------+------------+ ! Location      ! Signal!Reflux! Reflux (sec)! +--------------+------+------+------------+ ! Common Femoral!Phasic! No    !            ! +--------------+------+------+------------+ Left Lower Extremities DVT Study Measurements Left 2D Measurements +------------------------+----------+---------------+----------+ ! Location                ! Visualized! Compressibility! Thrombosis! +------------------------+----------+---------------+----------+ ! Sapheno Femoral Junction! Yes       ! Yes            ! None      ! +------------------------+----------+---------------+----------+ ! GSV Thigh               ! Yes       ! Yes            ! None      ! +------------------------+----------+---------------+----------+ ! Common Femoral          !Yes       ! Yes            ! None      ! +------------------------+----------+---------------+----------+ ! Femoral                 !Yes       ! Yes            ! None      ! +------------------------+----------+---------------+----------+ ! Prox Femoral            !Yes       ! Yes            ! None      ! +------------------------+----------+---------------+----------+ ! Mid Femoral             !Yes       ! Yes            ! None      ! +------------------------+----------+---------------+----------+ ! Dist Femoral            !Yes       ! No             !Acute     ! +------------------------+----------+---------------+----------+ ! Deep Femoral            !Yes       ! Yes            ! None      ! +------------------------+----------+---------------+----------+ ! Popliteal               !Yes       ! No             !Acute     ! +------------------------+----------+---------------+----------+ ! GSV Below Knee          ! Yes       ! Yes            ! None      ! +------------------------+----------+---------------+----------+ ! Gastroc                 ! Yes       ! No             !Acute     ! +------------------------+----------+---------------+----------+ ! Soleal                  !Yes       ! No             !Acute     ! +------------------------+----------+---------------+----------+ ! PTV                     ! Yes       ! No             !Acute     ! +------------------------+----------+---------------+----------+ ! Peroneal                !Yes       ! No             !Acute     ! +------------------------+----------+---------------+----------+ ! GSV Calf                ! Yes       ! Yes            ! None      ! +------------------------+----------+---------------+----------+ ! SSV                     ! Yes       ! Yes            ! None      ! +------------------------+----------+---------------+----------+ Left Doppler Measurements +------------------------+------+------+------------+ ! Location                ! Signal!Reflux! Reflux (sec)! +------------------------+------+------+------------+ ! Sapheno Femoral Junction! Phasic! No    !            ! +------------------------+------+------+------------+ ! Common Femoral          !Phasic! No    !            ! +------------------------+------+------+------------+ ! Femoral                 !Phasic! No    !            ! +------------------------+------+------+------------+ ! Dist Femoral            !Absent! No    !            ! +------------------------+------+------+------------+ ! Deep Femoral            !Phasic! No    !            ! +------------------------+------+------+------------+ ! Popliteal               !Absent! No    !            ! +------------------------+------+------+------------+    XR CHEST PORTABLE    Result Date: 3/6/2023  EXAMINATION: ONE XRAY VIEW OF THE CHEST 3/6/2023 1:17 pm COMPARISON: Chest x-ray dated 3 March 2023 HISTORY: ORDERING SYSTEM PROVIDED HISTORY: SOB TECHNOLOGIST PROVIDED HISTORY: Reason for exam:->SOB Reason for Exam: Shortness of Breath FINDINGS: Interval worsening of diffuse bilateral airspace disease. No pneumothorax or pleural effusion. Prominence of the left superior hilum is stable. 1.  Interval worsening of diffuse airspace opacities which could represent edema or infection.  2.  Prominence of the left superior pulmonary hilum shown to represent a probable carcinoma on the comparison CT chest from 13 February 2023     XR CHEST PORTABLE    Result Date: 2/16/2023  EXAMINATION: ONE XRAY VIEW OF THE CHEST 2/16/2023 12:57 pm COMPARISON: 02/13/2023 HISTORY: ORDERING SYSTEM PROVIDED HISTORY: L pleural effusion, pleutici CP, hypoxia. ? Increased effusion TECHNOLOGIST PROVIDED HISTORY: Reason for exam:->L pleural effusion, pleutici CP, hypoxia. ? Increased effusion Reason for Exam: SOB FINDINGS: Heart size is stable. Mediastinal contours are unchanged, including left suprahilar opacity. Patchy interstitial and airspace opacities in the lungs are unchanged. No evidence of pneumothorax or sizable pleural effusion. No free air beneath the diaphragm. No significant interval change. Patchy bilateral interstitial and airspace opacities. Left suprahilar opacity. XR CHEST PORTABLE    Result Date: 2/13/2023  EXAMINATION: ONE XRAY VIEW OF THE CHEST 2/13/2023 10:50 am COMPARISON: None. HISTORY: ORDERING SYSTEM PROVIDED HISTORY: sob TECHNOLOGIST PROVIDED HISTORY: Reason for exam:->sob Reason for Exam: sob FINDINGS: Diffuse airspace infiltrates. Prominence of the left superior mediastinal border. No pneumothorax or pleural effusion     1. Prominence of the left superior mediastinal border. It is unclear if this represents a paramediastinal mass or lymphadenopathy. Recommend further evaluation with a CT chest as there are no prior studies for comparison. 2.  Diffuse airspace infiltrates. These are indeterminate this could represent pneumonia or edema. CT CHEST PULMONARY EMBOLISM W CONTRAST    Result Date: 3/6/2023  EXAMINATION: CTA OF THE CHEST 3/6/2023 2:25 pm TECHNIQUE: CTA of the chest was performed after the administration of 75 mL Isovue 370 intravenous contrast.  Multiplanar reformatted images are provided for review. MIP images are provided for review. Automated exposure control, iterative reconstruction, and/or weight based adjustment of the mA/kV was utilized to reduce the radiation dose to as low as reasonably achievable.  COMPARISON: Correlation with concurrent chest radiograph, CT on 02/13/2023 HISTORY: Recently diagnosed lung cancer.  Acute hypoxia and hemoptysis. FINDINGS: Image quality degraded by motion artifact. Pulmonary Arteries: Bilateral pulmonary emboli seen on prior CT have resolved.  No new filling defect seen.  Main pulmonary artery is normal in caliber. Mediastinum: Heart is not enlarged.  Minimal pericardial effusion.  Thoracic aorta is normal caliber.  Mediastinal and hilar lymphadenopathy seen on prior CT has not significantly changed.  Thyroid is unremarkable.  Small hiatal hernia with associated wall thickening. Lungs/pleura: The mass in the posterior left upper lobe extending into the superior left lower lobe does not appear significantly changed from recent CT.  Diffuse ground-glass opacity throughout both lungs has increased. Minimal left pleural effusion has decreased. Upper Abdomen: Cholecystectomy.  Scattered hepatic metastasis as seen on recent CT. Soft Tissues/Bones: Widespread sclerotic osseous metastatic disease as seen on recent imaging.     1. Acute bilateral pulmonary emboli seen on prior CT have resolved. 2. Diffuse ground-glass opacity throughout both lungs has increased and could be due to atypical pneumonia. 3. Mass in the posterior left upper lobe extending into the superior left lower lobe has not significantly changed and is consistent with known malignancy. 4. Mediastinal and hilar lymphadenopathy has not significantly changed from prior CT. 5. Diffuse osseous metastatic disease. 6. Partially visualized hepatic metastatic disease.     CT CHEST PULMONARY EMBOLISM W CONTRAST    Result Date: 2/13/2023  EXAMINATION: CTA OF THE CHEST 2/13/2023 12:19 pm TECHNIQUE: CTA of the chest was performed after the administration of intravenous contrast.  Multiplanar reformatted images are provided for review.  MIP images are provided for review. Automated exposure control, iterative reconstruction, and/or weight based adjustment of the mA/kV was utilized to reduce the radiation dose to as low as  reasonably achievable. COMPARISON: None. HISTORY: ORDERING SYSTEM PROVIDED HISTORY: sob/tachy/abnl cxr TECHNOLOGIST PROVIDED HISTORY: Reason for exam:->sob/tachy/abnl cxr Decision Support Exception - unselect if not a suspected or confirmed emergency medical condition->Emergency Medical Condition (MA) Reason for Exam: chest pain in LT lower anterior chest, SOB,  dry cough , dizziness symptoms x 4-6 weeks but gerttting worse. pt did test (+) for COVID today   pt has had COVID one previous time with no lasting complications Relevant Medical/Surgical History: HTN,  GB removed FINDINGS: Pulmonary Arteries: The study is of good technical quality. Acute bilateral pulmonary embolism is confirmed. A filling defect is observed within the left upper lobar pulmonary artery. Filling defect also demonstrated within posterior segmental branches of the right lower lobar pulmonary artery. Poor characterization of the right upper lobar pulmonary artery due to dense beam hardening artifact within the SVC. However, there does appear to be a filling defect within an anterior segmental branch of the right upper lobar pulmonary artery as well. RV: LV ratio 1.2. Mediastinum: Mild cardiomegaly. Small pericardial effusion. Normal aorta. Thyroid and esophagus normal.  Extensive mediastinal and hilar lymphadenopathy. Index right paratracheal lymph node measures 13 mm. Lungs/pleura: The airways are patent. Small left pleural effusion. There is a mass in the left pulmonary hilum extending into the upper lobe. Best approximate measurements 3.5 x 3.5 cm. Of note, this mass appears to cross the major fissure involving the superior segment of the left lower lobe as well and insinuates within the left pulmonary hilum. Additional solid pulmonary nodules are identified in the bilateral lower lobes and right upper lobe. An index nodule in the right upper lobe measures 7 x 5 mm.   Mosaic pattern of ground-glass opacification in the lungs diffusely. Interlobular septal thickening is also demonstrated. Upper Abdomen: Multiple low attenuating masses are demonstrated within the liver. These cannot be characterized on this angiographic study of the chest.  The largest nodule in the right hepatic lobe measures 3.2 cm. Soft Tissues/Bones: No skeletal abnormalities throughout the chest.     1. Acute bilateral pulmonary embolism is confirmed. 2. No significant right ventricular strain. 3. Large mass centered in the left pulmonary hilum, suspected to represent bronchogenic carcinoma. Pulmonary follow-up is recommended. 4. Mediastinal and bilateral hilar lymphadenopathy. 5. Multiple small pulmonary nodules bilaterally. Pulmonary metastasis cannot be excluded. 6. Suspected malignant left pleural effusion. 7. Suspected hepatic metastasis. 8. Mosaic attenuation throughout the lungs likely in association with known pulmonary emboli. 9. Small pericardial effusion noted incidentally. Findings were discussed with the emergency room clinician at 1:15 pm on 2/13/2023. CT BIOPSY DEEP BONE PERCUTANEOUS    Result Date: 2/20/2023  PROCEDURE: CT GUIDED CORE NEEDLE BONE BIOPSY OF THE LEFT ILIAC BONE. MODERATE CONSCIOUS SEDATION 2/20/2023 HISTORY: ORDERING SYSTEM PROVIDED HISTORY: Likely mets to lung, spoke to Dr. Adam Gonzalez, he has reviewed images. Will stop heaprin for 12 hours TECHNOLOGIST PROVIDED HISTORY: Reason for exam:->Likely mets to lung, spoke to Dr. Adam Gonzalez, he has reviewed images. Will stop heaprin for 12 hours Reason for Exam: metastatic disease SEDATION: Moderate sedation was ordered and supervised by the attending with physician face-to-face monitoring. Medications were provided and recorded by Radiology nurses. Moderate conscious sedation was administered for 7 minutes TECHNIQUE: Informed consent was obtained following a detailed explanation of the procedure including risks, benefits, and alternatives. Universal protocol was followed.   The patient was placed on the CT table in a prone position. Sterile gowns, masks, hats and gloves utilized for maximal sterile barrier. Axial images were obtained through the iliac bones using CT guidance and a suitable skin site was prepped and draped in sterile fashion. Local anesthesia was achieved with lidocaine. An 11 gauge [employer] bone marrow biopsy needle was advanced into the left and a core biopsy specimen was obtained and the patient tolerated the procedure well. EBL: Minimal, less than 5 cc Dose modulation, iterative reconstruction, and/or weight based adjustment of the mA/kV was utilized to reduce the radiation dose to as low as reasonably achievable. Automated exposure control, iterative reconstruction, and/or weight based adjustment of the mA/kV was utilized to reduce the radiation dose to as low as reasonably achievable. Successful CT guided core biopsy of the iliac bone. MRI BRAIN W WO CONTRAST    Result Date: 2/24/2023  EXAMINATION: MRI OF THE BRAIN WITHOUT AND WITH CONTRAST  2/21/2023 5:54 pm TECHNIQUE: Multiplanar multisequence MRI of the head/brain was performed without and with the administration of intravenous contrast. COMPARISON: None. HISTORY: ORDERING SYSTEM PROVIDED HISTORY: large pulmonary mass with mets to liver and bone TECHNOLOGIST PROVIDED HISTORY: Reason for exam:->large pulmonary mass with mets to liver and bone Reason for Exam: large pulmonary mass with mets to liver and bone Initial evaluation FINDINGS: INTRACRANIAL STRUCTURES/VENTRICLES:  There is no acute infarct. There is mild cerebral atrophy. There are multiple punctate areas of high-signal in the periventricular white matter and centrum semiovale that are partly related to chronic small vessel ischemic disease. There are multiple punctate areas of high signal associated with enhancing lesions scattered throughout the supratentorial and infratentorial brain. The lesions are too numerous to count.   The largest lesion is in the right cerebellum and measures 1.1 cm. There is a lesion in the medial temporal lobe on the right measuring 1.8 x 1.4 cm that appears to be extra-axial in location. It  is difficult to say if this is related to an extradural metastasis or incidental meningioma. ORBITS: The visualized portion of the orbits demonstrate no acute abnormality. SINUSES: There is mild mucoperiosteal thickening of the maxillary sinuses and ethmoid air cells. The remainder of the sinuses are clear. The mastoid air cells are clear. BONES/SOFT TISSUES: There is a lesion in the skull in the right frontal region consistent with metastasis. There is a 2nd tiny skull metastasis in the right occipital region. Numerous punctate metastases scattered throughout the brain parenchyma in the supratentorial and infratentorial compartments of the brain. There is also an enhancing lesion that is extra-axial in location near the medial aspect of the right temporal lobe/lateral to the cavernous sinus. This appears to be extra-axial and may represent an extradural metastasis versus an incidental meningioma. There are 1 or 2 skull metastases identified. There is mild cerebral atrophy. There are minimal chronic small vessel ischemic changes. Left pelvic bone, needle core biopsies:   -Metastatic moderately differentiated adenocarcinoma of pulmonary origin,   involving bone. Assessment:  Principal Problem:    Severe sepsis (HCC)  Active Problems:    Acute respiratory failure with hypoxia (HCC)    Severe malnutrition (HCC)    Pulmonary infiltrates    Metastatic adenocarcinoma (HCC)    History of pulmonary embolism    Lactic acidosis    Leukocytosis    Thrombocytopenia (HCC)    Elevated alkaline phosphatase level  Resolved Problems:    * No resolved hospital problems.  *          Plan:   Oxygen supplementation to keep saturation between 90 and 94% only  Patient's oxygen requirements are going up and patient was on 15 L of high flow oxygen with saturation 88 to 92% when evaluated  If patient has worsening hypoxemia patient may require Vapotherm oxygen  Patient's case was discussed by oncology with patient and family and patient is not a candidate for any chemo or immunotherapy  Patient will be requiring comfort care measures-hospice consult has been requested  Nursing was told to discuss with patient's family and internal medicine team about change of CODE STATUS if deemed appropriate  If patient has worsening hypoxemia and patient requires mechanical vent support-it may be not comfort care measures and also patient may not have a meaningful recovery after that  Patient lives in [place] and patient has to figure out the logistics how to reach home safely  Patient has been started on Levaquin and vancomycin by the admitting provider for possible postobstructive pneumonia  IV steroids to continue which probably for lungs as well as for brain mets  Trend the WBC count/lactic acid levels  Patient has received IV fluids as per protocol  Monitor input output and BMP  Correct electrolytes on whenever necessary basis  Patient is on Eliquis  Trend the platelet counts  [place] Thyroid as per TSH as per IM  PUD prophylaxis      Case discussed with patient, family and nursing    Overall prognosis the patient is guarded    Further management regarding care as per patient's decision about hospice care and also how to provide the logistics so that patient can go to [place]-to be discussed with the case management    No other recommendations from pulmonary/critical care standpoint of view- will sign off            Electronically signed by:  Magalys Baumann MD    3/8/2023    9:46 AM.

## 2023-03-08 NOTE — ACP (ADVANCE CARE PLANNING)
Advance Care Planning   The patient has the following advanced directives on file:  Advance Directives       Power of [address] ACP-Advance Directive ACP-Power of     Not on File Not on File Not on File Not on File            The patient has appointed the following active healthcare agents:    Primary Decision Maker: Alex Marcum - Spouse - [phone]    The Patient has the following current code status:    Code Status: Limited    Visit Documentation:  I discussed Advance Care Planning with Anat Chandler today which included the patient's choices for care and treatment in the case of a health event that adversely affects decision-making abilities. She stated the Advance Care Directives on file are current. We discussed her current values, goals and care preferences at the end of life. Anat Chandler has no questions at this time and has agreed to keep me up-to-date should anything change. 1. Goals of medical treatment were reviewed . 2. Patient's stated goal/treatment preference is Limited x 1 med only (DNI/DNR ) . 3. Others present during the discussion Spouse Eugene Cat at bedside    4. The discussion lasted 20 minutes. 5. I will notify Treatment team  of change in care plan.     Henok Samuel MD  3/8/2023

## 2023-03-08 NOTE — PROGRESS NOTES
Hospice of [address] [place] [address] held with spouse and patient to discuss [facility] philosophy and levels of care. Educational brochure provided for the family and patient to review. All contact numbers provided     Patient qualifies for [facility] admission. I spoke to Abdulaziz Jacob, coordinator, for mission flight ([phone]) This is the gentleman that would coordinate her getting back to [place] when and if she is stable enough to [address] home. I will ask for [facility] 61 bed tomorrow  and try to get her transferred. I left all educational materials for them to review tonight. I will give discuss patient with DR Tiffani Caldwell prior to transfer.     I have updated Mayte  and Edgard Dsouza RN    Any questions please call [phone]    Thank you for the referral  Pita JIANG, RN  [phone]

## 2023-03-08 NOTE — PLAN OF CARE
Problem: Discharge Planning  Goal: Discharge to home or other facility with appropriate resources  3/8/2023 1450 by Armin Barnes RN  Outcome: Progressing  3/8/2023 0151 by Roxanne Foster  Outcome: Progressing  Flowsheets (Taken 3/7/2023 1845 by Trinh Ayala RN)  Discharge to home or other facility with appropriate resources: Identify barriers to discharge with patient and caregiver     Problem: Safety - Adult  Goal: Free from fall injury  3/8/2023 1450 by Armin Barnes RN  Outcome: Progressing  3/8/2023 0151 by Roxanne Foster  Outcome: Progressing     Problem: ABCDS Injury Assessment  Goal: Absence of physical injury  3/8/2023 1450 by Armin Barnes RN  Outcome: Progressing  3/8/2023 0151 by Roxanne Foster  Outcome: Progressing     Problem: Respiratory - Adult  Goal: Achieves optimal ventilation and oxygenation  3/8/2023 1450 by Armin Barnes RN  Outcome: Progressing  3/8/2023 0151 by Roxanne Foster  Outcome: Progressing  Flowsheets (Taken 3/7/2023 1845 by Trinh Ayala RN)  Achieves optimal ventilation and oxygenation: Assess for changes in respiratory status     Problem: Cardiovascular - Adult  Goal: Maintains optimal cardiac output and hemodynamic stability  3/8/2023 1450 by Armin Barnes RN  Outcome: Progressing  3/8/2023 0151 by Roxanne Foster  Outcome: Progressing  Flowsheets (Taken 3/7/2023 1845 by Trinh Ayala RN)  Maintains optimal cardiac output and hemodynamic stability: Monitor blood pressure and heart rate  Goal: Absence of cardiac dysrhythmias or at baseline  3/8/2023 1450 by Armin Barnes RN  Outcome: Progressing  3/8/2023 0151 by Roxanne Foster  Outcome: Progressing  Flowsheets (Taken 3/7/2023 1845 by Trinh Ayala RN)  Absence of cardiac dysrhythmias or at baseline:   Monitor cardiac rate and rhythm   Administer antiarrhythmia medication and electrolyte replacement as ordered   Assess for signs of decreased cardiac output     Problem: Skin/Tissue Integrity - Adult  Goal: Skin integrity remains intact  3/8/2023 1450 by Uriel Khan RN  Outcome: Progressing  3/8/2023 0151 by Celia Garrett  Outcome: Progressing  Flowsheets (Taken 3/7/2023 1845 by Maren Paul RN)  Skin Integrity Remains Intact: Monitor for areas of redness and/or skin breakdown     Problem: Genitourinary - Adult  Goal: Absence of urinary retention  3/8/2023 1450 by Uriel Khan RN  Outcome: Progressing  3/8/2023 0151 by Celia Garrett  Outcome: Progressing  Flowsheets (Taken 3/7/2023 1845 by Maren Paul RN)  Absence of urinary retention: Assess patients ability to void and empty bladder     Problem: Metabolic/Fluid and Electrolytes - Adult  Goal: Electrolytes maintained within normal limits  3/8/2023 1450 by Uriel Khan RN  Outcome: Progressing  3/8/2023 0151 by Celia Garrett  Outcome: Progressing  Flowsheets (Taken 3/7/2023 1845 by Maren Paul RN)  Electrolytes maintained within normal limits: Monitor labs and assess patient for signs and symptoms of electrolyte imbalances  Goal: Hemodynamic stability and optimal renal function maintained  3/8/2023 1450 by Uriel Khan RN  Outcome: Progressing  3/8/2023 0151 by Celia Garrett  Outcome: Progressing  Flowsheets (Taken 3/7/2023 1845 by Maren Paul RN)  Hemodynamic stability and optimal renal function maintained: Monitor labs and assess for signs and symptoms of volume excess or deficit     Problem: Nutrition Deficit:  Goal: Optimize nutritional status  3/8/2023 1450 by Uriel Khan RN  Outcome: Progressing  3/8/2023 0151 by Celia Garrett  Outcome: Progressing     Problem: Skin/Tissue Integrity  Goal: Absence of new skin breakdown  Description: 1. Monitor for areas of redness and/or skin breakdown  2. Assess vascular access sites hourly  3. Every 4-6 hours minimum:  Change oxygen saturation probe site  4.   Every 4-6 hours:  If on nasal continuous positive airway pressure, respiratory therapy assess nares and determine need for appliance change or resting period.  Outcome: Progressing

## 2023-03-08 NOTE — CARE COORDINATION
Met with the Pt and spouse at the bedside regarding hospice consult. Plan per Spouse at this time is plane transport to [facility] in [facility]. Oncologist will be Dr. Tahmina Mcdonald at [facility]. Per Spouse, their missionary medical advisor is working on arranging transfer and plane transport for the Pt back to [facility]. His name is Ministerio Caal [phone]. Message to MD to update and ask to reach out to Ministerio Caal to make arrangements for xfr. Per Dr. Gable Lombard he has already spoken to the Doctor there. RN CM not involved with xfr, but is available to assist if needed. Will continue to follow. Pt is on 15 lHF o2 at this time. Addendum 1544pm: Met with the pt and Spouse at the bedside. Per Spouse Pulm will not sign off for the Pt to fly to [facility] due to her increased O2 requirements at this time. They have opted to stay here in [place] and start hospice services. We discussed hospice agencies. They would like referral to [facility] for plan to transfer to [facility] and then once O2 requirements are more stable transition to home with hospice at [facility] who lives in this area. Referral made to [facility]. Serjio Collado RN to see the Pt and spouse. Pt given extra pillow for comfort. Family asking about incentive spirometer, states REDDY told them respiratory would bring it in. Nursing notified and will get incentive spirometer for Pt and educate on use.

## 2023-03-08 NOTE — ONCOLOGY
ONCOLOGY HEMATOLOGY CARE PROGRESS NOTE      SUBJECTIVE:    Afebrile and on 8 LPM by high flow NC. Her  is at the bedside. We discussed her case. See below. ROS:     Constitutional:  No weight loss, No fever, No chills, No night sweats. Energy level good.   Eyes:  No impairment or change in vision  ENT / Mouth:  No pain, abnormal ulceration, bleeding, nasal drip or change in voice or hearing  Cardiovascular:  No chest pain, palpitations, new edema, or calf discomfort  Respiratory:  No pain, hemoptysis, change to breathing  Breast:  No pain, discharge, change in appearance or texture  Gastrointestinal:  No pain, cramping, jaundice, change to eating and bowel habits  Urinary:  No pain, bleeding or change in continence  Genitalia: No pain, bleeding or discharge  Musculoskeletal:  No redness, pain, edema or weakness  Skin:  No pruritus, rash, change to nodules or lesions  Neurologic:  No discomfort, change in mental status, speech, sensory or motor activity  Psychiatric:  No change in concentration or change to affect or mood  Endocrine:  No hot flashes, increased thirst, or change to urine production  Hematologic: No petechiae, ecchymosis or bleeding  Lymphatic:  No lymphadenopathy or lymphedema  Allergy / Immunologic:  No eczema, hives, frequent or recurrent infections    OBJECTIVE        Physical    VITALS:  Patient Vitals for the past 24 hrs:   BP Temp Temp src Pulse Resp SpO2 Height Weight   03/08/23 0445 -- -- -- -- -- -- -- 177 lb 4 oz (80.4 kg)   03/08/23 0415 129/89 97.8 °F (36.6 °C) Oral (!) 127 16 -- -- --   03/08/23 0054 -- -- -- -- 16 -- -- --   03/08/23 0015 123/81 98.1 °F (36.7 °C) Oral (!) 124 16 91 % -- --   03/07/23 2038 108/82 98.1 °F (36.7 °C) Axillary (!) 144 24 (!) 89 % -- --   03/07/23 1700 135/89 -- -- (!) 133 22 -- -- --   03/07/23 1600 132/82 -- -- (!) 132 22 -- -- --   03/07/23 1500 (!) 147/82 -- -- (!) 129 17 -- -- --   03/07/23 1400 136/80 -- -- (!) 134 20 -- -- --   03/07/23 1300 (!) 144/82 -- -- (!) 131 20 -- -- --   03/07/23 1200 126/84 98.1 °F (36.7 °C) Oral (!) 131 17 -- -- --   03/07/23 1100 131/82 -- -- (!) 135 15 -- -- --   03/07/23 1015 (!) 146/88 -- -- (!) 136 20 93 % -- --   03/07/23 0918 -- -- -- -- -- -- 5' 1\" (1.549 m) --   03/07/23 0900 (!) 147/57 -- -- (!) 117 17 92 % -- --   03/07/23 0800 (!) 142/86 -- -- (!) 111 15 92 % -- --   03/07/23 0700 (!) 145/69 -- -- (!) 112 19 93 % -- --       24HR INTAKE/OUTPUT:    Intake/Output Summary (Last 24 hours) at 3/8/2023 5991  Last data filed at 3/8/2023 0031  Gross per 24 hour   Intake 2102.78 ml   Output 2925 ml   Net -822.22 ml       CONSTITUTIONAL: awake, alert, cooperative, no apparent distress   EYES: pupils equal, round and reactive to light, sclera clear and conjunctiva normal  ENT: Normocephalic, without obvious abnormality, atraumatic  NECK: supple, symmetrical, no jugular venous distension and no carotid bruits   HEMATOLOGIC/LYMPHATIC: no cervical, supraclavicular or axillary lymphadenopathy   LUNGS: no increased work of breathing and clear to auscultation   CARDIOVASCULAR: regular rate and rhythm, normal S1 and S2, no murmur noted  ABDOMEN: normal bowel sounds x 4, soft, non-distended, non-tender, no masses palpated, no hepatosplenomgaly   MUSCULOSKELETAL: full range of motion noted, tone is normal  NEUROLOGIC: awake, alert, oriented to name, place and time. Motor skills grossly intact. SKIN: Normal skin color, texture, turgor and no jaundice.  appears intact   EXTREMITIES: no LE edema     DATA:  CBC:    Recent Labs     03/08/23  0516 03/07/23  0416 03/06/23  1310 03/02/23  1106 02/20/23  0749 02/17/23  0638 02/16/23  0637   WBC 23.3* 25.6* 26.7* 28.4*   < > 13.0* 14.2*   NEUTROABS  --  22.3* 23.8*  --   --  9.9* 10.9*   LYMPHOPCT  --  9.0 2.0  --   --  18.0 19.0   RBC 3.64* 3.60* 4.03 4.79   < > 4.00 3.66*   HGB 10.4* 10.3* 11.5* 13.3   < > 11.0* 10.3*   HCT 32.6* 31.6* 34.9* 41.8 < > 34.3* 31.3*   MCV 89.6 87.8 86.6 87.2   < > 85.9 85.4   MCH 28.6 28.5 28.4 27.8   < > 27.4 28.1   MCHC 31.9 32.5 32.8 31.9   < > 32.0 32.9   RDW 20.9* 20.3* 20.0* 19.7*   < > 17.1* 17.2*   PLT 72* 90* 115* 186   < > 228 186    < > = values in this interval not displayed. PT/INR:    Recent Labs     03/06/23  1310 03/02/23  1106 02/20/23  0644 02/15/23  0601 02/13/23  1112   PROT 5.8* 6.8  --   --  7.2   INR  --   --  0.99 1.16*  --      PTT:    Recent Labs     02/13/23  1305   APTT 26.5       CMP:    Recent Labs     03/08/23  0516 03/07/23  0416 03/06/23  1310 03/02/23  1106 02/21/23  0448 02/20/23  0644 02/19/23  0629 02/17/23  9567 02/16/23  0636 02/14/23  0357 02/13/23  1112    142 135* 139 142 146* 145   < > 145   < > 141   K 3.7 3.9 3.6 4.6 4.0 3.8 3.7   < > 3.5   < > 3.3*    107 98* 98* 102 107 111*   < > 112*   < > 101   CO2 20* 22 20* 24 26 25 24   < > 22   < > 24   GLUCOSE 115* 96 145* 93 107* 95 90   < > 96   < > 131*   BUN 24* 26* 41* 51* 17 18 16   < > 17   < > 22*   CREATININE 0.6 0.6 0.6 0.9 0.6 0.7 0.7   < > 0.7   < > 0.8   LABGLOM >60 >60 >60 >60 >60 >60 >60   < > >60   < > >60   CALCIUM 9.7 9.5 9.9 10.7* 9.6 9.7 9.4   < > 9.1   < > 10.4   PROT  --   --  5.8* 6.8  --   --   --   --   --   --  7.2   LABALBU  --   --  3.6 4.0 3.5 3.5 2.9*  --   --   --  4.0   AGRATIO  --   --  1.6 1.4  --   --   --   --   --   --  1.3   BILITOT  --   --  0.9 0.9  --   --   --   --   --   --  0.9   ALKPHOS  --   --  377* 456*  --   --   --   --   --   --  351*   ALT  --   --  29 39  --   --   --   --   --   --  21   AST  --   --  35 22  --   --   --   --   --   --  34   MG  --   --   --   --  2.20 2.20 2.10  --  2.10   < >  --     < > = values in this interval not displayed.        Lab Results   Component Value Date    CALCIUM 9.7 03/08/2023    PHOS 3.7 02/21/2023       LDH:  Recent Labs     02/15/23  0601   *       Radiology Review:  CT CHEST PULMONARY EMBOLISM W CONTRAST  Narrative: EXAMINATION:  CTA OF THE CHEST 3/6/2023 2:25 pm    TECHNIQUE:  CTA of the chest was performed after the administration of 75 mL Isovue 370  intravenous contrast.  Multiplanar reformatted images are provided for  review. MIP images are provided for review. Automated exposure control,  iterative reconstruction, and/or weight based adjustment of the mA/kV was  utilized to reduce the radiation dose to as low as reasonably achievable. COMPARISON:  Correlation with concurrent chest radiograph, CT on 02/13/2023    HISTORY:  Recently diagnosed lung cancer. Acute hypoxia and hemoptysis. FINDINGS:  Image quality degraded by motion artifact. Pulmonary Arteries: Bilateral pulmonary emboli seen on prior CT have  resolved. No new filling defect seen. Main pulmonary artery is normal in  caliber. Mediastinum: Heart is not enlarged. Minimal pericardial effusion. Thoracic  aorta is normal caliber. Mediastinal and hilar lymphadenopathy seen on prior  CT has not significantly changed. Thyroid is unremarkable. Small hiatal  hernia with associated wall thickening. Lungs/pleura: The mass in the posterior left upper lobe extending into the  superior left lower lobe does not appear significantly changed from recent  CT. Diffuse ground-glass opacity throughout both lungs has increased. Minimal left pleural effusion has decreased. Upper Abdomen: Cholecystectomy. Scattered hepatic metastasis as seen on  recent CT. Soft Tissues/Bones: Widespread sclerotic osseous metastatic disease as seen  on recent imaging. Impression: 1. Acute bilateral pulmonary emboli seen on prior CT have resolved. 2. Diffuse ground-glass opacity throughout both lungs has increased and could  be due to atypical pneumonia. 3. Mass in the posterior left upper lobe extending into the superior left  lower lobe has not significantly changed and is consistent with known  malignancy.   4. Mediastinal and hilar lymphadenopathy has not significantly changed from  prior CT. 5. Diffuse osseous metastatic disease. 6. Partially visualized hepatic metastatic disease. XR CHEST PORTABLE  Narrative: EXAMINATION:  ONE XRAY VIEW OF THE CHEST    3/6/2023 1:17 pm    COMPARISON:  Chest x-ray dated 3 March 2023    HISTORY:  ORDERING SYSTEM PROVIDED HISTORY: SOB  TECHNOLOGIST PROVIDED HISTORY:  Reason for exam:->SOB  Reason for Exam: Shortness of Breath    FINDINGS:  Interval worsening of diffuse bilateral airspace disease. No pneumothorax or  pleural effusion. Prominence of the left superior hilum is stable. Impression: 1. Interval worsening of diffuse airspace opacities which could represent  edema or infection. 2.  Prominence of the left superior pulmonary hilum shown to represent a  probable carcinoma on the comparison CT chest from 13 February 2023      Problem List  Patient Active Problem List   Diagnosis    Acute respiratory failure (HCC)    Acute deep vein thrombosis (DVT) of proximal vein of left lower extremity (HCC)    Bilateral pulmonary embolism (HCC)    Elevated troponin    Deep venous thrombosis (HCC)    Pericardial effusion    Pleural effusion on left    Pneumonia due to COVID-19 virus    Mass of left lung    Mediastinal adenopathy    Hilar adenopathy    Metastasis to liver (HCC)    Severe sepsis (HCC)    Severe malnutrition ([address]       ASSESSMENT AND PLAN:    1.) 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 - This is occurring in a female nonsmoker.  - Caris tumor profiling is pending.  - A CTPA on 3/06/2022 showed a stable left lung mas, stable mediastinal LAD, diffuse sclerotic bone mets, scattered liver mets. Overall this is stable. 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   This is a missense mutation that lacks an indication for the usual EGFR inhibitors. In general, the EGFR inhibitors do not work as well for this mutation, though there are case reports of patients attaining a TN that can last for some months. Additionally, I would expect a 2-4 week delay in getting the medication, as it will initially be denied and then we would need to appeal.  Co-pay would be expensive, as there will not likely be co-pay assistance due to the lack of an indication for this rare missense mutation.  - Immunotherapy, is less likely to be of benefit in a patient with an EGFR  mutation. The major side effect is pneumonitis, which she cannot afford at this time. - Cytotoxic chemotherapy would be poorly tolerated in her current condition.  - Given the above new information, it would be reasonable to shift our focus to comfort. The patient and her  want time to take in this new information. 2.) Brain mets  - An MRI of the brain on 2/21/2023 showed numerous punctate brain mets in the supratentorial and infratentorial parenchyma of the brain.    - On Dex 4 mg BID at home for this reason. 3.) Bilateral GGO  - SARS-CoV-2 PCR neg on 3/06/2023. - Influenza A/B neg on 2/06/2023. - Blood cultures collected on 3/06/2023 are in process. - On Levaquin, Vanco/ Azithro currently. - Now on 6 LPM by NC.     4.) Bilateral PEs  - A CTPA at the time of diagnosis on 2/13/2023 showed bilateral PEs.    - Treating with Eliquis. - a CTPA on 3/06/2023 showed resolution of pulmonary emboli. ONCOLOGIC DISPOSITION:  TBD.     Bharath Desouza MD  Please contact through [facility]

## 2023-03-09 VITALS
SYSTOLIC BLOOD PRESSURE: 145 MMHG | DIASTOLIC BLOOD PRESSURE: 90 MMHG | BODY MASS INDEX: 34.38 KG/M2 | OXYGEN SATURATION: 91 % | WEIGHT: 182.1 LBS | RESPIRATION RATE: 18 BRPM | TEMPERATURE: 97.6 F | HEIGHT: 61 IN | HEART RATE: 129 BPM

## 2023-03-09 LAB
ANION GAP SERPL CALCULATED.3IONS-SCNC: 11 MMOL/L (ref 3–16)
ANISOCYTOSIS: ABNORMAL
BANDED NEUTROPHILS RELATIVE PERCENT: 4 % (ref 0–7)
BASOPHILS ABSOLUTE: 0 K/UL (ref 0–0.2)
BASOPHILS RELATIVE PERCENT: 0 %
BUN BLDV-MCNC: 21 MG/DL (ref 7–20)
CALCIUM SERPL-MCNC: 9.7 MG/DL (ref 8.3–10.6)
CHLORIDE BLD-SCNC: 104 MMOL/L (ref 99–110)
CO2: 24 MMOL/L (ref 21–32)
CREAT SERPL-MCNC: <0.5 MG/DL (ref 0.6–1.2)
EOSINOPHILS ABSOLUTE: 0 K/UL (ref 0–0.6)
EOSINOPHILS RELATIVE PERCENT: 0 %
GFR SERPL CREATININE-BSD FRML MDRD: >60 ML/MIN/{1.73_M2}
GLUCOSE BLD-MCNC: 154 MG/DL (ref 70–99)
HCT VFR BLD CALC: 28.7 % (ref 36–48)
HEMOGLOBIN: 9.3 G/DL (ref 12–16)
LYMPHOCYTES ABSOLUTE: 1.1 K/UL (ref 1–5.1)
LYMPHOCYTES RELATIVE PERCENT: 5 %
MACROCYTES: ABNORMAL
MCH RBC QN AUTO: 28.5 PG (ref 26–34)
MCHC RBC AUTO-ENTMCNC: 32.4 G/DL (ref 31–36)
MCV RBC AUTO: 87.8 FL (ref 80–100)
METAMYELOCYTES RELATIVE PERCENT: 1 %
MONOCYTES ABSOLUTE: 1.1 K/UL (ref 0–1.3)
MONOCYTES RELATIVE PERCENT: 5 %
NEUTROPHILS ABSOLUTE: 19.7 K/UL (ref 1.7–7.7)
NEUTROPHILS RELATIVE PERCENT: 85 %
OVALOCYTES: ABNORMAL
PDW BLD-RTO: 20.6 % (ref 12.4–15.4)
PLATELET # BLD: 85 K/UL (ref 135–450)
PLATELET SLIDE REVIEW: ABNORMAL
PMV BLD AUTO: 9.6 FL (ref 5–10.5)
POIKILOCYTES: ABNORMAL
POLYCHROMASIA: ABNORMAL
POTASSIUM REFLEX MAGNESIUM: 4.3 MMOL/L (ref 3.5–5.1)
RBC # BLD: 3.27 M/UL (ref 4–5.2)
SODIUM BLD-SCNC: 139 MMOL/L (ref 136–145)
TEAR DROP CELLS: ABNORMAL
WBC # BLD: 21.9 K/UL (ref 4–11)

## 2023-03-09 PROCEDURE — 6370000000 HC RX 637 (ALT 250 FOR IP): Performed by: INTERNAL MEDICINE

## 2023-03-09 PROCEDURE — 2580000003 HC RX 258: Performed by: INTERNAL MEDICINE

## 2023-03-09 PROCEDURE — 94761 N-INVAS EAR/PLS OXIMETRY MLT: CPT

## 2023-03-09 PROCEDURE — 6360000002 HC RX W HCPCS: Performed by: INTERNAL MEDICINE

## 2023-03-09 PROCEDURE — 2700000000 HC OXYGEN THERAPY PER DAY

## 2023-03-09 PROCEDURE — 51702 INSERT TEMP BLADDER CATH: CPT

## 2023-03-09 PROCEDURE — 36415 COLL VENOUS BLD VENIPUNCTURE: CPT

## 2023-03-09 PROCEDURE — 94669 MECHANICAL CHEST WALL OSCILL: CPT

## 2023-03-09 PROCEDURE — 85025 COMPLETE CBC W/AUTO DIFF WBC: CPT

## 2023-03-09 PROCEDURE — 80048 BASIC METABOLIC PNL TOTAL CA: CPT

## 2023-03-09 RX ORDER — LEVOFLOXACIN 500 MG/1
500 TABLET, FILM COATED ORAL DAILY
Qty: 5 TABLET | Refills: 0 | Status: SHIPPED | OUTPATIENT
Start: 2023-03-09 | End: 2023-03-14

## 2023-03-09 RX ORDER — FAMOTIDINE 10 MG/ML
20 INJECTION, SOLUTION INTRAVENOUS ONCE
Status: DISCONTINUED | OUTPATIENT
Start: 2023-03-09 | End: 2023-03-09

## 2023-03-09 RX ORDER — PANTOPRAZOLE SODIUM 40 MG/1
40 TABLET, DELAYED RELEASE ORAL 2 TIMES DAILY
Status: DISCONTINUED | OUTPATIENT
Start: 2023-03-10 | End: 2023-03-09 | Stop reason: HOSPADM

## 2023-03-09 RX ORDER — FAMOTIDINE 20 MG/1
20 TABLET, FILM COATED ORAL ONCE
Status: COMPLETED | OUTPATIENT
Start: 2023-03-09 | End: 2023-03-09

## 2023-03-09 RX ORDER — CALCIUM CARBONATE 200(500)MG
500 TABLET,CHEWABLE ORAL 3 TIMES DAILY PRN
Status: DISCONTINUED | OUTPATIENT
Start: 2023-03-09 | End: 2023-03-09 | Stop reason: HOSPADM

## 2023-03-09 RX ORDER — DOXYCYCLINE HYCLATE 100 MG
100 TABLET ORAL 2 TIMES DAILY
Qty: 10 TABLET | Refills: 0 | Status: SHIPPED | OUTPATIENT
Start: 2023-03-09 | End: 2023-03-14

## 2023-03-09 RX ORDER — DEXAMETHASONE 4 MG/1
4 TABLET ORAL EVERY 12 HOURS SCHEDULED
Qty: 20 TABLET | Refills: 0 | Status: SHIPPED | OUTPATIENT
Start: 2023-03-09 | End: 2023-03-19

## 2023-03-09 RX ADMIN — VANCOMYCIN HYDROCHLORIDE 1000 MG: 1 INJECTION, POWDER, LYOPHILIZED, FOR SOLUTION INTRAVENOUS at 10:58

## 2023-03-09 RX ADMIN — THYROID, PORCINE 60 MG: 30 TABLET ORAL at 08:32

## 2023-03-09 RX ADMIN — FAMOTIDINE 20 MG: 20 TABLET, FILM COATED ORAL at 17:20

## 2023-03-09 RX ADMIN — DEXAMETHASONE 4 MG: 4 TABLET ORAL at 08:32

## 2023-03-09 RX ADMIN — Medication 10 ML: at 08:32

## 2023-03-09 RX ADMIN — TRAMADOL HYDROCHLORIDE 50 MG: 50 TABLET ORAL at 11:21

## 2023-03-09 RX ADMIN — LEVOFLOXACIN 750 MG: 5 INJECTION, SOLUTION INTRAVENOUS at 09:16

## 2023-03-09 RX ADMIN — MICONAZOLE NITRATE: 2 POWDER TOPICAL at 08:33

## 2023-03-09 RX ADMIN — SODIUM CHLORIDE: 9 INJECTION, SOLUTION INTRAVENOUS at 10:56

## 2023-03-09 RX ADMIN — PANTOPRAZOLE SODIUM 40 MG: 40 TABLET, DELAYED RELEASE ORAL at 08:32

## 2023-03-09 RX ADMIN — APIXABAN 5 MG: 5 TABLET, FILM COATED ORAL at 08:32

## 2023-03-09 ASSESSMENT — PAIN SCALES - GENERAL
PAINLEVEL_OUTOF10: 6
PAINLEVEL_OUTOF10: 3
PAINLEVEL_OUTOF10: 7
PAINLEVEL_OUTOF10: 0
PAINLEVEL_OUTOF10: 6

## 2023-03-09 ASSESSMENT — PAIN SCALES - WONG BAKER
WONGBAKER_NUMERICALRESPONSE: 0

## 2023-03-09 ASSESSMENT — PAIN - FUNCTIONAL ASSESSMENT: PAIN_FUNCTIONAL_ASSESSMENT: PREVENTS OR INTERFERES WITH MANY ACTIVE NOT PASSIVE ACTIVITIES

## 2023-03-09 ASSESSMENT — PAIN DESCRIPTION - LOCATION: LOCATION: RIB CAGE;BUTTOCKS

## 2023-03-09 ASSESSMENT — PAIN DESCRIPTION - ORIENTATION: ORIENTATION: RIGHT;LEFT

## 2023-03-09 NOTE — PLAN OF CARE
Problem: Discharge Planning  Goal: Discharge to home or other facility with appropriate resources  3/9/2023 1231 by Soumya Chiang RN  Outcome: Progressing  Flowsheets (Taken 3/9/2023 0830 by Mike Jones RN)  Discharge to home or other facility with appropriate resources: Identify barriers to discharge with patient and caregiver  3/9/2023 0012 by Jailene De La Torre RN  Outcome: Progressing     Problem: Safety - Adult  Goal: Free from fall injury  3/9/2023 1231 by Soumya Chiang RN  Outcome: Progressing  3/9/2023 0012 by Jailene De La Torre RN  Outcome: Progressing     Problem: ABCDS Injury Assessment  Goal: Absence of physical injury  3/9/2023 1231 by Soumya Chiang RN  Outcome: Progressing  3/9/2023 0012 by Jailene De La Torre RN  Outcome: Progressing     Problem: Respiratory - Adult  Goal: Achieves optimal ventilation and oxygenation  3/9/2023 1231 by Soumya Chiang RN  Outcome: Progressing  Flowsheets (Taken 3/9/2023 0830 by Mike Jones RN)  Achieves optimal ventilation and oxygenation:   Assess for changes in respiratory status   Assess for changes in mentation and behavior   Position to facilitate oxygenation and minimize respiratory effort   Oxygen supplementation based on oxygen saturation or arterial blood gases  3/9/2023 0012 by Jailene De La Torre RN  Outcome: Progressing     Problem: Cardiovascular - Adult  Goal: Maintains optimal cardiac output and hemodynamic stability  3/9/2023 1231 by Soumya Chiang RN  Outcome: Progressing  Flowsheets (Taken 3/9/2023 0830 by Mike Jones RN)  Maintains optimal cardiac output and hemodynamic stability: Monitor blood pressure and heart rate  3/9/2023 0012 by Jailene De La Torre RN  Outcome: Progressing  Goal: Absence of cardiac dysrhythmias or at baseline  3/9/2023 1231 by Soumya Chiang RN  Outcome: Progressing  Flowsheets (Taken 3/9/2023 0830 by Mike Jones RN)  Absence of cardiac dysrhythmias or at baseline: Monitor cardiac rate and rhythm  3/9/2023 0012 by Faith Poole RN  Outcome: Progressing     Problem: Skin/Tissue Integrity - Adult  Goal: Skin integrity remains intact  3/9/2023 1231 by Yari Alcantar RN  Outcome: Progressing  Flowsheets (Taken 3/9/2023 0830 by Irvin Peñaloza RN)  Skin Integrity Remains Intact: Monitor for areas of redness and/or skin breakdown  3/9/2023 0012 by Faith Poole RN  Outcome: Progressing     Problem: Genitourinary - Adult  Goal: Absence of urinary retention  3/9/2023 1231 by Yari Alcantar RN  Outcome: Progressing  Flowsheets (Taken 3/9/2023 0830 by Irvin Peñaloza RN)  Absence of urinary retention:   Assess patients ability to void and empty bladder   Monitor intake/output and perform bladder scan as needed  3/9/2023 0012 by Faith Poole RN  Outcome: Progressing     Problem: Metabolic/Fluid and Electrolytes - Adult  Goal: Electrolytes maintained within normal limits  3/9/2023 1231 by Yari Alcantar RN  Outcome: Progressing  Flowsheets (Taken 3/9/2023 0830 by Irvin Peñaloza RN)  Electrolytes maintained within normal limits:   Monitor labs and assess patient for signs and symptoms of electrolyte imbalances   Administer electrolyte replacement as ordered  3/9/2023 0012 by Faith Poole RN  Outcome: Progressing  Goal: Hemodynamic stability and optimal renal function maintained  3/9/2023 1231 by Yari Alcantar RN  Outcome: Progressing  Flowsheets (Taken 3/9/2023 0830 by Ivrin Peñaloza RN)  Hemodynamic stability and optimal renal function maintained:   Monitor labs and assess for signs and symptoms of volume excess or deficit   Monitor intake, output and patient weight  3/9/2023 0012 by Faith Poole RN  Outcome: Progressing     Problem: Nutrition Deficit:  Goal: Optimize nutritional status  3/9/2023 1231 by Yari Alcantar RN  Outcome: Progressing  3/9/2023 0012 by Faith Poole RN  Outcome: Progressing     Problem: Skin/Tissue Integrity  Goal: Absence of new skin breakdown  Description: 1. Monitor for areas of redness and/or skin breakdown  2. Assess vascular access sites hourly  3. Every 4-6 hours minimum:  Change oxygen saturation probe site  4. Every 4-6 hours:  If on nasal continuous positive airway pressure, respiratory therapy assess nares and determine need for appliance change or resting period.   3/9/2023 1231 by Lenora Rangel RN  Outcome: Progressing  3/9/2023 0012 by Sakina Kelly RN  Outcome: Progressing     Problem: Pain  Goal: Verbalizes/displays adequate comfort level or baseline comfort level  3/9/2023 1231 by Lenora Rangel RN  Outcome: Progressing  Flowsheets (Taken 3/9/2023 0830 by Citlali Pitts RN)  Verbalizes/displays adequate comfort level or baseline comfort level:   Encourage patient to monitor pain and request assistance   Assess pain using appropriate pain scale  3/9/2023 0012 by Sakina Kelly RN  Outcome: Progressing  Flowsheets (Taken 3/8/2023 1131 by Citlali Pitts RN)  Verbalizes/displays adequate comfort level or baseline comfort level:   Encourage patient to monitor pain and request assistance   Assess pain using appropriate pain scale   Administer analgesics based on type and severity of pain and evaluate response

## 2023-03-09 NOTE — CARE COORDINATION
Nena/HOC updated writer, she met with  at bedside again this morning, plan for [id number] as bed becomes available, hopefully today. Large family is planning to arrive in town today/Graham steiner is advocating for pt to be moved today to accommodate family visiting at [facility]. CM will assist as needed.   SOLITARIO Lebron-RN

## 2023-03-09 NOTE — PROGRESS NOTES
Per Oncology/Hematology, please place obrien catheter for comfort and to help keep pt.'s buttocks dry and intact. Preventative for excoriation. 16fr/10cc obrien catheter to be inserted.

## 2023-03-09 NOTE — CARE COORDINATION
Case Management Discharge Summary- Hospice Discharge    Destination: [facility]osp[facility] of [address] [place] [address] Agency name and contact: [address] [place] [address], [phone]    Durable Equipment arrangements are completed by the Hospice nurse. [address] [place] [address] Dr JEREZ signed and placed in discharge packet AND patient's hard chart. (This is required for DNR patients.)    Signed [facility]/AVS faxed to above agency/facility. Transportation arrangements per [employer]. [facility]  [facility] completed and signed by: writer  Transport form placed with discharge packet: done    Notified Family: done by Zion Hilton with  and other family at bedside    Patient's RN notified of plan: by Zion Hilton    Note:    Discharging nurse to complete nursing portion of [facility], reconcile AVS, place final copy with patient's discharge packet. RN to ensure signed prescriptions are sent home with patient or the facility as per nursing protocol.

## 2023-03-09 NOTE — DISCHARGE SUMMARY
Hospital Medicine Discharge Summary    Patient ID: Shanell Moreno      Patient's PCP: Fior Miguel, APRN - CNP    Admit Date: 3/6/2023     Discharge Date: 03/09/23    Admitting Provider: Alma Delia Gomez MD     Discharge Provider: Alma Delia Gomez MD     Discharge Diagnoses: Active Hospital Problems    Diagnosis     Pulmonary infiltrates [R91.8]      Priority: Medium    Metastatic adenocarcinoma ([address] [C79.9]      Priority: Medium    History of pulmonary embolism [Z86.711]      Priority: Medium    Lactic acidosis [E87.20]      Priority: Medium    Leukocytosis [D72.829]      Priority: Medium    Thrombocytopenia (HCC) [D69.6]      Priority: Medium    Elevated alkaline phosphatase level [R74.8]      Priority: Medium    Severe malnutrition (HCC) [E43]      Priority: Medium    Severe sepsis ([address] [A41.9, R65.20]      Priority: Medium    Acute respiratory failure with hypoxia ([address] [J96.01]      Priority: Medium       The patient was seen and examined on day of discharge and this discharge summary is in conjunction with any daily progress note from day of discharge. Hospital Course: ***          Physical Exam Performed:     BP (!) 145/90   Pulse (!) 129   Temp 97.6 °F (36.4 °C) (Oral)   Resp 20   Ht 5' 1\" (1.549 m)   Wt 182 lb 1.6 oz (82.6 kg)   SpO2 91%   BMI 34.41 kg/m²       General appearance:  No apparent distress, appears stated age and cooperative. HEENT:  Normal cephalic, atraumatic without obvious deformity. Pupils equal, round, and reactive to light. Extra ocular muscles intact. Conjunctivae/corneas clear. Neck: Supple, with full range of motion. No jugular venous distention. Trachea midline. Respiratory:  Normal respiratory effort. Clear to auscultation, bilaterally without Rales/Wheezes/Rhonchi. Cardiovascular:  Regular rate and rhythm with normal S1/S2 without murmurs, rubs or gallops. Abdomen: Soft, non-tender, non-distended with normal bowel sounds.   Musculoskeletal:  No clubbing, cyanosis or edema bilaterally. Full range of motion without deformity. Skin: Skin color, texture, turgor normal.  No rashes or lesions. Neurologic:  Neurovascularly intact without any focal sensory/motor deficits. Cranial nerves: II-XII intact, grossly non-focal.  Psychiatric:  Alert and oriented, thought content appropriate, normal insight  Capillary Refill: Brisk,< 3 seconds   Peripheral Pulses: +2 palpable, equal bilaterally       Labs: For convenience and continuity at follow-up the following most recent labs are provided:      CBC:    Lab Results   Component Value Date/Time    WBC 21.9 03/09/2023 05:11 AM    HGB 9.3 03/09/2023 05:11 AM    HCT 28.7 03/09/2023 05:11 AM    PLT 85 03/09/2023 05:11 AM       Renal:    Lab Results   Component Value Date/Time     03/09/2023 05:11 AM    K 4.3 03/09/2023 05:11 AM     03/09/2023 05:11 AM    CO2 24 03/09/2023 05:11 AM    BUN 21 03/09/2023 05:11 AM    CREATININE <0.5 03/09/2023 05:11 AM    CALCIUM 9.7 03/09/2023 05:11 AM    PHOS 3.7 02/21/2023 04:48 AM         Significant Diagnostic Studies    Radiology:   CT CHEST PULMONARY EMBOLISM W CONTRAST   Final Result   1. Acute bilateral pulmonary emboli seen on prior CT have resolved. 2. Diffuse ground-glass opacity throughout both lungs has increased and could   be due to atypical pneumonia. 3. Mass in the posterior left upper lobe extending into the superior left   lower lobe has not significantly changed and is consistent with known   malignancy. 4. Mediastinal and hilar lymphadenopathy has not significantly changed from   prior CT. 5. Diffuse osseous metastatic disease. 6. Partially visualized hepatic metastatic disease. XR CHEST PORTABLE   Final Result   1. Interval worsening of diffuse airspace opacities which could represent   edema or infection.       2.  Prominence of the left superior pulmonary hilum shown to represent a   probable carcinoma on the comparison CT chest from 13 February 2023            Consults:   IP CONSULT TO HOSPITALIST  IP CONSULT TO PULMONOLOGY  IP CONSULT TO PHARMACY  IP CONSULT TO ONCOLOGY  IP CONSULT TO HOSPICE    [facility]      Condition at Discharge: Terminal    Discharge Instructions/Follow-up:      Code Status:  Limited     Activity: activity as tolerated    Diet: regular diet      Discharge Medications:   Current Discharge Medication List             Details   dexamethasone (DECADRON) 4 MG tablet Take 1 tablet by mouth every 12 hours for 10 days  Qty: 20 tablet, Refills: 0      miconazole (MICOTIN) 2 % powder Apply topically 2 times daily. Qty: 45 g, Refills: 1                Details   levoFLOXacin (LEVAQUIN) 500 MG tablet Take 1 tablet by mouth daily for 5 days  Qty: 5 tablet, Refills: 0      doxycycline hyclate (VIBRA-TABS) 100 MG tablet Take 1 tablet by mouth 2 times daily for 5 days  Qty: 10 tablet, Refills: 0    Associated Diagnoses: Community acquired pneumonia of right lower lobe of lung                Details   traMADol (ULTRAM) 50 MG tablet Take 1 tablet by mouth every 6 hours as needed for Pain for up to 14 days. Intended supply: 14 days.  Take lowest dose possible to manage pain Max Daily Amount: 200 mg  Qty: 28 tablet, Refills: 0    Comments: Reduce doses taken as pain becomes manageable  Associated Diagnoses: Pain due to malignant neoplasm metastatic to bone (HCC)      apixaban (ELIQUIS) 5 MG TABS tablet Take 1 tablet by mouth 2 times daily  Qty: 60 tablet, Refills: 2      ascorbic acid (VITAMIN C) 500 MG tablet Take 1 tablet by mouth daily  Qty: 30 tablet, Refills: 3      Bacillus Coagulans-Inulin (ALIGN PREBIOTIC-PROBIOTIC PO) Take by mouth daily      VITAMIN D PO Take 10,000 Int'l Units by mouth daily      Biotin 1000 MCG TABS Take 10,000 mcg by mouth nightly      Melatonin 5 MG CHEW Take 5 mg by mouth nightly      atorvastatin (LIPITOR) 40 MG tablet atorvastatin 40 mg tablet      !! thyroid (ARMOUR) 30 MG tablet Take one tablet on Monday, Weednesday, and Friday      omeprazole (PRILOSEC) 40 MG delayed release capsule omeprazole 40 mg capsule,delayed release      !! thyroid (ARMOUR) 60 MG tablet Take 60 mg by mouth daily 1 tablet on Sunday, Tuesday, Thursday and Saturday       !! - Potential duplicate medications found. Please discuss with provider. Time Spent on discharge: 32 mins  in the examination, evaluation, counseling and review of medications and discharge plan. Signed:    Joselito Varner MD   3/9/2023      Thank you YANN Molina CNP for the opportunity to be involved in this patient's care. If you have any questions or concerns, please feel free to contact me at [phone].

## 2023-03-09 NOTE — PROGRESS NOTES
Hospitalist Progress Note      PCP: Luz Marina Peters, YANN - CNP    Date of Admission: 3/6/2023    Chief Complaint: Shortness of breath    Hospital Course: Reviewed H&P    Subjective: Patient seen and examined with her  at bedside. Patient reports feeling fatigued and lethargic. Continues with increased oxygen demand 8 L/min oxygen by nasal cannula in the morning and up to 15 L/min HFNC by the afternoon. Patient evaluated couple of times throughout the day . Patient's  reports that they were evaluated by oncology/pulmonology this morning and were discussed about EOL. Patient currently inclining towards hospice and patient's  prefers patient to be transported to [place] (where patient's family lives) which would be arranged through her [employer]/missionary. Received call regarding patient's status from Dr. Michael De Santiago (Doctor from [place] ) who is getting information to arrange transportation. Discussed with pulmonology(Dr. Louisa Carcamo) and given increased oxygen requirement patient not stable enough for transportation . Patient and family aware of risks of transportation including death while transportation. Patient and her  would like to think further but still wishes to be transported. Continue rest of the current care.         Medications:  Reviewed    Infusion Medications    sodium chloride 10 mL/hr at 03/07/23 4438     Scheduled Medications    dexamethasone  4 mg Oral 2 times per day    miconazole   Topical BID    melatonin  5 mg Oral Nightly    thyroid  30 mg Oral Once per day on Mon Wed Fri    thyroid  60 mg Oral Once per day on Sun Tue Thu Sat    vancomycin  1,000 mg IntraVENous Q12H    0.9 % sodium chloride  434 mL IntraVENous Once    sodium chloride flush  5-40 mL IntraVENous 2 times per day    levofloxacin  750 mg IntraVENous Q24H    apixaban  5 mg Oral BID    atorvastatin  40 mg Oral Nightly    pantoprazole  40 mg Oral QAM AC    sodium chloride  500 mL IntraVENous Once PRN Meds: potassium chloride **OR** potassium alternative oral replacement **OR** potassium chloride, bisacodyl, sodium chloride flush, sodium chloride, ondansetron **OR** ondansetron, polyethylene glycol, acetaminophen **OR** acetaminophen, traMADol      Intake/Output Summary (Last 24 hours) at 3/9/2023 0818  Last data filed at 3/9/2023 0548  Gross per 24 hour   Intake 1659.48 ml   Output 2950 ml   Net -1290.52 ml       Physical Exam Performed:  /80   Pulse (!) 129   Temp 97.9 °F (36.6 °C) (Axillary)   Resp 20   Ht 5' 1\" (1.549 m)   Wt 182 lb 1.6 oz (82.6 kg)   SpO2 90%   BMI 34.41 kg/m²     General appearance: No apparent distress, appears stated age and cooperative. On HFNC 15 L/min  HEENT:  Normal cephalic, atraumatic without obvious deformity. Pupils equal, round, and reactive to light. Extra ocular muscles intact. Conjunctivae/corneas clear. Neck: Supple, with full range of motion. No jugular venous distention. Trachea midline. Respiratory: Increased respiratory effort. Accessory respiratory muscle usage noted, diminished breath sounds at bases bilaterally with occasional  Rales  Cardiovascular:  Regular rate and rhythm with normal S1/S2 without murmurs, rubs or gallops. Abdomen: Soft, non-tender, non-distended with normal bowel sounds. Musculoskeletal:  No clubbing, cyanosis or edema bilaterally. Full range of motion without deformity. Skin: Skin color, texture, turgor normal.  No rashes or lesions. Neurologic:  Neurovascularly intact without any focal sensory/motor deficits.  Cranial nerves: II-XII intact, grossly non-focal.  Psychiatric:  Alert and oriented, thought content appropriate, normal insight  Capillary Refill: Brisk,< 3 seconds   Peripheral Pulses: +2 palpable, equal bilaterally       Labs:   Recent Labs     03/07/23  0416 03/08/23  0516 03/09/23  0511   WBC 25.6* 23.3* 21.9*   HGB 10.3* 10.4* 9.3*   HCT 31.6* 32.6* 28.7*   PLT 90* 72* 85*       Recent Labs 03/07/23  0416 03/08/23  0516 03/09/23  0511    143 139   K 3.9 3.7 4.3    108 104   CO2 22 20* 24   BUN 26* 24* 21*   CREATININE 0.6 0.6 <0.5*   CALCIUM 9.5 9.7 9.7       Recent Labs     03/06/23  1310   AST 35   ALT 29   BILITOT 0.9   ALKPHOS 377*       Recent Labs     03/06/23  1310 03/06/23  1938 03/06/23  2237   TROPONINI 0.05* 0.05* 0.02*       Radiology:  CT CHEST PULMONARY EMBOLISM W CONTRAST   Final Result   1. Acute bilateral pulmonary emboli seen on prior CT have resolved. 2. Diffuse ground-glass opacity throughout both lungs has increased and could   be due to atypical pneumonia. 3. Mass in the posterior left upper lobe extending into the superior left   lower lobe has not significantly changed and is consistent with known   malignancy. 4. Mediastinal and hilar lymphadenopathy has not significantly changed from   prior CT. 5. Diffuse osseous metastatic disease. 6. Partially visualized hepatic metastatic disease. XR CHEST PORTABLE   Final Result   1. Interval worsening of diffuse airspace opacities which could represent   edema or infection.       2.  Prominence of the left superior pulmonary hilum shown to represent a   probable carcinoma on the comparison CT chest from 13 February 2023           Consults:  IP CONSULT TO HOSPITALIST  IP CONSULT TO PULMONOLOGY  IP CONSULT TO PHARMACY  IP CONSULT TO ONCOLOGY  IP CONSULT TO [address] [place] [address]    Diagnosis Date Noted    Pulmonary infiltrates [R91.8] 03/08/2023     Priority: Medium    Metastatic adenocarcinoma ([facility] [C79.9] 03/08/2023     Priority: Medium    History of pulmonary embolism [Z86.711] 03/08/2023     Priority: Medium    Lactic acidosis [E87.20] 03/08/2023     Priority: Medium    Leukocytosis [D72.829] 03/08/2023     Priority: Medium    Thrombocytopenia ([address] [D69.6] 03/08/2023     Priority: Medium    Elevated alkaline phosphatase level [R74.8] 03/08/2023     Priority: Medium    Severe malnutrition ([facility] [E43] 03/07/2023     Priority: Medium    Severe sepsis ([facility] [A41.9, R65.20] 03/06/2023     Priority: Medium    Acute respiratory failure with hypoxia ([facility] [J96.01] 02/13/2023     Priority: Medium     Assessment/Plan:  Acute on chronic hypoxic respiratory failure with severe sepsis secondary to atypical pneumonia POA  -75-year-old female recently diagnosed (2/12/2023 )with metastatic lung cancer with spread to liver/bone/brain and with chronic hypoxic respiratory failure 4 L/min p/w worsening shortness of breath. CTPA showed resolution of her bilateral PE but multifocal bilateral infiltrates c/w atypical pneumonia.   -She met severe sepsis criteria -tachycardia, tachypnea, leukocytosis, elevated lactic(5.7) and imaging consistent with atypical pneumonia. Flu/COVID testing in the ED negative. Received 30 mill per KG fluid bolus and downtrended lactic.   -  Received empiric IV Rocephin/Levaquin in ED-broaden coverage to Vanco and Levaquin (could not use cefepime/Zosyn given patient's allergy to penicillins)   -Patient currently with increased oxygen requirements up to 15 L/min HFNC. Admitted to ICU initially for closer monitoring. Received IV hydration and antibiotics . -Request pulmonology assistance with management. Evaluated and discontinued IV fluids and ordered 1 dose of IV Lasix  40 mg. Monitor I's/O  - Had [address] conversation with patient and her  at bedside in ED -patient discussed with other family members and prefers limited x 1 (IV meds only) code at this time. Please see additional documentation (ACP note note) on the same date  -Encourage I-S and Acapella to improve mucociliary clearance. 2.  Metastatic lung cancer(adenocarcinoma) with spread to liver, bone, brain -as per work-up during last admission (2/13/23- 2/22/23)   - Patient follows with Dr. Karolina Whitlock and Dr. Dejon Morales but hasn't initiated on Rx yet . Awaiting caris mutation .   Consulted oncology -evaluated and recommended hospice  -Patient and her  prefers to be transported to her hometown in [place].  -Restarted home dose of Decadron 4 mg twice daily. 3.  Recently diagnosed with PE -on Eliquis at home; continue  -CTPA on admission showed resolution     DVT Prophylaxis: Eliquis   Diet: ADULT DIET; Regular  ADULT ORAL NUTRITION SUPPLEMENT; Lunch, Dinner; Fortified Pudding Oral Supplement  Code Status: Limited    PT/OT Eval Status: Not yet ordered     Dispo - Likely 2-3 days pending Clinical improvement . Patient/family more inclined towards hospice. We will continue to follow    Appropriate for A1 Discharge Unit: No     The note was completed using Dragon -speech recognition software & EMR  . Every effort was made to ensure accuracy; however, inadvertent computerized transcription errors may be present.     Venora Aase, MD

## 2023-03-09 NOTE — PLAN OF CARE
Problem: Discharge Planning  Goal: Discharge to home or other facility with appropriate resources  3/9/2023 0012 by Shane Acevedo RN  Outcome: Progressing  3/8/2023 1450 by Uriel Khan RN  Outcome: Progressing     Problem: Safety - Adult  Goal: Free from fall injury  3/9/2023 0012 by Shane Acevedo RN  Outcome: Progressing  3/8/2023 1450 by Uriel Khan RN  Outcome: Progressing     Problem: ABCDS Injury Assessment  Goal: Absence of physical injury  3/9/2023 0012 by Shane Acevedo RN  Outcome: Progressing  3/8/2023 1450 by Uriel Khan RN  Outcome: Progressing     Problem: Respiratory - Adult  Goal: Achieves optimal ventilation and oxygenation  3/9/2023 0012 by Shane Acevedo RN  Outcome: Progressing  3/8/2023 1450 by Uriel Khan RN  Outcome: Progressing     Problem: Cardiovascular - Adult  Goal: Maintains optimal cardiac output and hemodynamic stability  3/9/2023 0012 by Shane Acevedo RN  Outcome: Progressing  3/8/2023 1450 by Uriel Khan RN  Outcome: Progressing  Goal: Absence of cardiac dysrhythmias or at baseline  3/9/2023 0012 by Shane Acevedo RN  Outcome: Progressing  3/8/2023 1450 by Uriel Khan RN  Outcome: Progressing     Problem: Skin/Tissue Integrity - Adult  Goal: Skin integrity remains intact  3/9/2023 0012 by Shane Acevedo RN  Outcome: Progressing  3/8/2023 1450 by Uriel Khan RN  Outcome: Progressing     Problem: Genitourinary - Adult  Goal: Absence of urinary retention  3/9/2023 0012 by Shane Acevedo RN  Outcome: Progressing  3/8/2023 1450 by Uriel Khan RN  Outcome: Progressing     Problem: Metabolic/Fluid and Electrolytes - Adult  Goal: Electrolytes maintained within normal limits  3/9/2023 0012 by Shane Acevedo RN  Outcome: Progressing  3/8/2023 1450 by Uriel Khan RN  Outcome: Progressing  Goal: Hemodynamic stability and optimal renal function maintained  3/9/2023 0012 by Shane Acevedo RN  Outcome: Progressing  3/8/2023 1450 by Simona Cespedes RN  Outcome: Progressing     Problem: Nutrition Deficit:  Goal: Optimize nutritional status  3/9/2023 0012 by Gigi Ramos RN  Outcome: Progressing  3/8/2023 1450 by Simona Cespedes RN  Outcome: Progressing     Problem: Skin/Tissue Integrity  Goal: Absence of new skin breakdown  Description: 1. Monitor for areas of redness and/or skin breakdown  2. Assess vascular access sites hourly  3. Every 4-6 hours minimum:  Change oxygen saturation probe site  4. Every 4-6 hours:  If on nasal continuous positive airway pressure, respiratory therapy assess nares and determine need for appliance change or resting period.   3/9/2023 0012 by Gigi Ramos RN  Outcome: Progressing  3/8/2023 1450 by Simona Cespedes RN  Outcome: Progressing     Problem: Pain  Goal: Verbalizes/displays adequate comfort level or baseline comfort level  Outcome: Progressing  Flowsheets (Taken 3/8/2023 1131 by Chester Ramirez RN)  Verbalizes/displays adequate comfort level or baseline comfort level:   Encourage patient to monitor pain and request assistance   Assess pain using appropriate pain scale   Administer analgesics based on type and severity of pain and evaluate response

## 2023-03-09 NOTE — ONCOLOGY
ONCOLOGY HEMATOLOGY CARE PROGRESS NOTE      SUBJECTIVE:    Afebrile and on 15 LPM by high flow NC. Sleeping soundly. No family present. Did not awaken to exam.    ROS:     Constitutional:  No weight loss, No fever, No chills, No night sweats. Energy level good.   Eyes:  No impairment or change in vision  ENT / Mouth:  No pain, abnormal ulceration, bleeding, nasal drip or change in voice or hearing  Cardiovascular:  No chest pain, palpitations, new edema, or calf discomfort  Respiratory:  No pain, hemoptysis, change to breathing  Breast:  No pain, discharge, change in appearance or texture  Gastrointestinal:  No pain, cramping, jaundice, change to eating and bowel habits  Urinary:  No pain, bleeding or change in continence  Genitalia: No pain, bleeding or discharge  Musculoskeletal:  No redness, pain, edema or weakness  Skin:  No pruritus, rash, change to nodules or lesions  Neurologic:  No discomfort, change in mental status, speech, sensory or motor activity  Psychiatric:  No change in concentration or change to affect or mood  Endocrine:  No hot flashes, increased thirst, or change to urine production  Hematologic: No petechiae, ecchymosis or bleeding  Lymphatic:  No lymphadenopathy or lymphedema  Allergy / Immunologic:  No eczema, hives, frequent or recurrent infections    OBJECTIVE        Physical    VITALS:  Patient Vitals for the past 24 hrs:   BP Temp Temp src Pulse Resp SpO2 Weight   03/09/23 0611 -- -- -- (!) 129 -- 90 % --   03/09/23 0450 133/80 97.9 °F (36.6 °C) Axillary (!) 129 20 93 % --   03/09/23 0418 -- -- -- -- -- -- 182 lb 1.6 oz (82.6 kg)   03/09/23 0319 -- -- -- (!) 122 -- 94 % --   03/08/23 2358 130/78 97 °F (36.1 °C) Axillary (!) 128 24 95 % --   03/08/23 2240 -- -- -- (!) 133 -- 95 % --   03/08/23 2050 -- -- -- -- 24 -- --   03/08/23 2037 -- -- -- (!) 136 -- (!) 89 % --   03/08/23 2027 -- -- -- (!) 139 -- (!) 86 % --   03/08/23 2010 138/85 98.5 °F (36.9 °C) Oral (!) 125 24 (!) 88 % --   03/08/23 1548 139/84 98.1 °F (36.7 °C) Axillary (!) 127 18 93 % --   03/08/23 1131 136/86 97.8 °F (36.6 °C) Oral (!) 128 18 95 % --   03/08/23 0822 129/82 98 °F (36.7 °C) Oral (!) 137 18 (!) 88 % --         24HR INTAKE/OUTPUT:    Intake/Output Summary (Last 24 hours) at 3/9/2023 8262  Last data filed at 3/9/2023 0548  Gross per 24 hour   Intake 1659.48 ml   Output 2950 ml   Net -1290.52 ml         CONSTITUTIONAL: awake, alert, cooperative, no apparent distress   EYES: pupils equal, round and reactive to light, sclera clear and conjunctiva normal  ENT: Normocephalic, without obvious abnormality, atraumatic  NECK: supple, symmetrical, no jugular venous distension and no carotid bruits   HEMATOLOGIC/LYMPHATIC: no cervical, supraclavicular or axillary lymphadenopathy   LUNGS: no increased work of breathing and clear to auscultation   CARDIOVASCULAR: regular rate and rhythm, normal S1 and S2, no murmur noted  ABDOMEN: normal bowel sounds x 4, soft, non-distended, non-tender, no masses palpated, no hepatosplenomgaly   MUSCULOSKELETAL: full range of motion noted, tone is normal  NEUROLOGIC: awake, alert, oriented to name, place and time. Motor skills grossly intact. SKIN: Normal skin color, texture, turgor and no jaundice.  appears intact   EXTREMITIES: no LE edema     DATA:  CBC:    Recent Labs     03/09/23  0511 03/08/23  0516 03/07/23  0416 03/06/23  1310   WBC 21.9* 23.3* 25.6* 26.7*   NEUTROABS 19.7* 18.4* 22.3* 23.8*   LYMPHOPCT 5.0 14.0 9.0 2.0   RBC 3.27* 3.64* 3.60* 4.03   HGB 9.3* 10.4* 10.3* 11.5*   HCT 28.7* 32.6* 31.6* 34.9*   MCV 87.8 89.6 87.8 86.6   MCH 28.5 28.6 28.5 28.4   MCHC 32.4 31.9 32.5 32.8   RDW 20.6* 20.9* 20.3* 20.0*   PLT 85* 72* 90* 115*         PT/INR:    Recent Labs     03/06/23  1310 03/02/23  1106 02/20/23  0644 02/15/23  0601 02/13/23  1112   PROT 5.8* 6.8  --   --  7.2   INR  --   --  0.99 1.16*  --        PTT:    Recent Labs     02/13/23  1305 APTT 26.5         CMP:    Recent Labs     03/09/23  0511 03/08/23  0516 03/07/23  0416 03/06/23  1310 03/02/23  1106 02/21/23  0448 02/20/23  0644 02/19/23  0629 02/17/23  0638 02/16/23  0636 02/14/23  0357 02/13/23  1112    143 142 135* 139 142 146* 145   < > 145   < > 141   K 4.3 3.7 3.9 3.6 4.6 4.0 3.8 3.7   < > 3.5   < > 3.3*    108 107 98* 98* 102 107 111*   < > 112*   < > 101   CO2 24 20* 22 20* 24 26 25 24   < > 22   < > 24   GLUCOSE 154* 115* 96 145* 93 107* 95 90   < > 96   < > 131*   BUN 21* 24* 26* 41* 51* 17 18 16   < > 17   < > 22*   CREATININE <0.5* 0.6 0.6 0.6 0.9 0.6 0.7 0.7   < > 0.7   < > 0.8   LABGLOM >60 >60 >60 >60 >60 >60 >60 >60   < > >60   < > >60   CALCIUM 9.7 9.7 9.5 9.9 10.7* 9.6 9.7 9.4   < > 9.1   < > 10.4   PROT  --   --   --  5.8* 6.8  --   --   --   --   --   --  7.2   LABALBU  --   --   --  3.6 4.0 3.5 3.5 2.9*  --   --   --  4.0   AGRATIO  --   --   --  1.6 1.4  --   --   --   --   --   --  1.3   BILITOT  --   --   --  0.9 0.9  --   --   --   --   --   --  0.9   ALKPHOS  --   --   --  377* 456*  --   --   --   --   --   --  351*   ALT  --   --   --  29 39  --   --   --   --   --   --  21   AST  --   --   --  35 22  --   --   --   --   --   --  34   MG  --   --   --   --   --  2.20 2.20 2.10  --  2.10   < >  --     < > = values in this interval not displayed. Lab Results   Component Value Date    CALCIUM 9.7 03/09/2023    PHOS 3.7 02/21/2023       LDH:  Recent Labs     02/15/23  0601   *         Radiology Review:  CT CHEST PULMONARY EMBOLISM W CONTRAST  Narrative: EXAMINATION:  CTA OF THE CHEST 3/6/2023 2:25 pm    TECHNIQUE:  CTA of the chest was performed after the administration of 75 mL Isovue 370  intravenous contrast.  Multiplanar reformatted images are provided for  review. MIP images are provided for review.  Automated exposure control,  iterative reconstruction, and/or weight based adjustment of the mA/kV was  utilized to reduce the radiation dose to as low as reasonably achievable.    COMPARISON:  Correlation with concurrent chest radiograph, CT on 02/13/2023    HISTORY:  Recently diagnosed lung cancer.  Acute hypoxia and hemoptysis.    FINDINGS:  Image quality degraded by motion artifact.    Pulmonary Arteries: Bilateral pulmonary emboli seen on prior CT have  resolved.  No new filling defect seen.  Main pulmonary artery is normal in  caliber.    Mediastinum: Heart is not enlarged.  Minimal pericardial effusion.  Thoracic  aorta is normal caliber.  Mediastinal and hilar lymphadenopathy seen on prior  CT has not significantly changed.  Thyroid is unremarkable.  Small hiatal  hernia with associated wall thickening.    Lungs/pleura: The mass in the posterior left upper lobe extending into the  superior left lower lobe does not appear significantly changed from recent  CT.  Diffuse ground-glass opacity throughout both lungs has increased.  Minimal left pleural effusion has decreased.    Upper Abdomen: Cholecystectomy.  Scattered hepatic metastasis as seen on  recent CT.    Soft Tissues/Bones: Widespread sclerotic osseous metastatic disease as seen  on recent imaging.  Impression: 1. Acute bilateral pulmonary emboli seen on prior CT have resolved.  2. Diffuse ground-glass opacity throughout both lungs has increased and could  be due to atypical pneumonia.  3. Mass in the posterior left upper lobe extending into the superior left  lower lobe has not significantly changed and is consistent with known  malignancy.  4. Mediastinal and hilar lymphadenopathy has not significantly changed from  prior CT.  5. Diffuse osseous metastatic disease.  6. Partially visualized hepatic metastatic disease.  XR CHEST PORTABLE  Narrative: EXAMINATION:  ONE XRAY VIEW OF THE CHEST    3/6/2023 1:17 pm    COMPARISON:  Chest x-ray dated 3 March 2023    HISTORY:  ORDERING SYSTEM PROVIDED HISTORY: SOB  TECHNOLOGIST PROVIDED HISTORY:  Reason for exam:->SOB  Reason for Exam: Shortness of  Breath    FINDINGS:  Interval worsening of diffuse bilateral airspace disease. No pneumothorax or  pleural effusion. Prominence of the left superior hilum is stable. Impression: 1. Interval worsening of diffuse airspace opacities which could represent  edema or infection. 2.  Prominence of the left superior pulmonary hilum shown to represent a  probable carcinoma on the comparison CT chest from 13 February 2023      Problem List  Patient Active Problem List   Diagnosis    Acute respiratory failure with hypoxia (HCC)    Acute deep vein thrombosis (DVT) of proximal vein of left lower extremity (HCC)    Bilateral pulmonary embolism (HCC)    Elevated troponin    Deep venous thrombosis (HCC)    Pericardial effusion    Pleural effusion on left    Pneumonia due to COVID-19 virus    Mass of left lung    Mediastinal adenopathy    Hilar adenopathy    Metastasis to liver (HCC)    Severe sepsis (HCC)    Severe malnutrition ([address]    Pulmonary infiltrates    Metastatic adenocarcinoma ([address]    History of pulmonary embolism    Lactic acidosis    Leukocytosis    Thrombocytopenia (HCC)    Elevated alkaline phosphatase level       ASSESSMENT AND PLAN:    1.) 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 - This is occurring in a female nonsmoker.  - A CTPA on 3/06/2022 showed a stable left lung mass, stable mediastinal LAD, diffuse sclerotic bone mets, scattered liver mets. Overall this is stable (but diffuse GGO is worse). 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 This is a missense mutation that lacks an indication for the usual EGFR inhibitors. In general, the EGFR inhibitors do not work as well for this mutation, though there are case reports of patients attaining a MS that can last for some months.   Additionally, I would expect a 2-4 week delay in getting the medication, as it will initially be denied and then we would need to appeal.  Co-pay would be expensive, as there will not likely be co-pay assistance due to the lack of an indication for this rare missense mutation.  - Immunotherapy, is less likely to be of benefit in a patient with an EGFR  mutation. The major side effect is pneumonitis, which she cannot afford at this time. - Cytotoxic chemotherapy would be poorly tolerated in her current condition.  - Given the above new information, it would be reasonable to shift our focus to comfort. - Ultimately elected for hospice on 3/08/2023.     2.) Brain mets  - An MRI of the brain on 2/21/2023 showed numerous punctate brain mets in the supratentorial and infratentorial parenchyma of the brain.    - On Dex 4 mg BID at home for this reason. 3.) Bilateral GGO  - SARS-CoV-2 PCR neg on 3/06/2023. - Influenza A/B neg on 2/06/2023. - Blood cultures collected on 3/06/2023 are in process. - On Kaley Polanco currently. - Now on 15 LPM by [facility].     4.) Bilateral PEs  - A CTPA at the time of diagnosis on 2/13/2023 showed bilateral PEs.    - Treating with Eliquis. - A CTPA on 3/06/2023 showed resolution of pulmonary emboli. ONCOLOGIC DISPOSITION:  Hospice.     Reina Griffith MD  Please contact through [facility]

## 2023-03-09 NOTE — PROGRESS NOTES
Hospice of [address] and chart review complete. Waiting on available bed at [place]. Assured family we remain available. Received message patient will be able to be transferred to [place] after 1900. Will set up transportation [address] pick-up 1930.       I have updated [facility] and [facility] RN    Any questions please call [phone]    Thank you for the referral  Richard Moon BSN, RN  [phone]

## 2023-03-10 LAB
BLOOD CULTURE, ROUTINE: NORMAL
CULTURE, BLOOD 2: NORMAL

## 2023-03-10 NOTE — PLAN OF CARE
Problem: Discharge Planning  Goal: Discharge to home or other facility with appropriate resources  3/9/2023 1920 by Nena Gomez RN  Outcome: Completed  3/9/2023 1231 by Nena Gomez RN  Outcome: Progressing  Flowsheets (Taken 3/9/2023 0830 by Ramesh Washington RN)  Discharge to home or other facility with appropriate resources: Identify barriers to discharge with patient and caregiver     Problem: Safety - Adult  Goal: Free from fall injury  3/9/2023 1920 by Nena Gomez RN  Outcome: Completed  3/9/2023 1231 by Nena Gomez RN  Outcome: Progressing     Problem: ABCDS Injury Assessment  Goal: Absence of physical injury  3/9/2023 1920 by Nena Gomez RN  Outcome: Completed  Flowsheets (Taken 3/9/2023 1716)  Absence of Physical Injury: Implement safety measures based on patient assessment  3/9/2023 1231 by Nena Gomez RN  Outcome: Progressing     Problem: Respiratory - Adult  Goal: Achieves optimal ventilation and oxygenation  3/9/2023 1920 by Nena Gomez RN  Outcome: Completed  3/9/2023 1231 by Nena Gomez RN  Outcome: Progressing  Flowsheets (Taken 3/9/2023 0830 by Ramesh Washington RN)  Achieves optimal ventilation and oxygenation:   Assess for changes in respiratory status   Assess for changes in mentation and behavior   Position to facilitate oxygenation and minimize respiratory effort   Oxygen supplementation based on oxygen saturation or arterial blood gases     Problem: Cardiovascular - Adult  Goal: Maintains optimal cardiac output and hemodynamic stability  3/9/2023 1920 by Nena Gomez RN  Outcome: Completed  3/9/2023 1231 by Nena Gomez RN  Outcome: Progressing  Flowsheets (Taken 3/9/2023 0830 by Ramesh Washington RN)  Maintains optimal cardiac output and hemodynamic stability: Monitor blood pressure and heart rate  Goal: Absence of cardiac dysrhythmias or at baseline  3/9/2023 1920 by Nena Gomez RN  Outcome: Completed  3/9/2023 1231 by Nena Gomez RN  Outcome: Progressing  Flowsheets (Taken 3/9/2023 0830 by Ramesh Washington RN)  Absence of cardiac dysrhythmias or at baseline: Monitor cardiac rate and rhythm     Problem: Skin/Tissue Integrity - Adult  Goal: Skin integrity remains intact  3/9/2023 1920 by Nena Gomez RN  Outcome: Completed  3/9/2023 1231 by Nena Gomez RN  Outcome: Progressing  Flowsheets (Taken 3/9/2023 0830 by Ramesh Washington RN)  Skin Integrity Remains Intact: Monitor for areas of redness and/or skin breakdown     Problem: Genitourinary - Adult  Goal: Absence of urinary retention  3/9/2023 1920 by Nena Gomez RN  Outcome: Completed  3/9/2023 1231 by Nena Gomez RN  Outcome: Progressing  Flowsheets (Taken 3/9/2023 0830 by Ramesh Washington RN)  Absence of urinary retention:   Assess patients ability to void and empty bladder   Monitor intake/output and perform bladder scan as needed     Problem: Metabolic/Fluid and Electrolytes - Adult  Goal: Electrolytes maintained within normal limits  3/9/2023 1920 by Nena Gomez RN  Outcome: Completed  3/9/2023 1231 by Nena Gomez RN  Outcome: Progressing  Flowsheets (Taken 3/9/2023 0830 by Ramesh Washington RN)  Electrolytes maintained within normal limits:   Monitor labs and assess patient for signs and symptoms of electrolyte imbalances   Administer electrolyte replacement as ordered  Goal: Hemodynamic stability and optimal renal function maintained  3/9/2023 1920 by Nena Gomez RN  Outcome: Completed  3/9/2023 1231 by Nena Gomez RN  Outcome: Progressing  Flowsheets (Taken 3/9/2023 0830 by Ramesh Washington RN)  Hemodynamic stability and optimal renal function maintained:   Monitor labs and assess for signs and symptoms of volume excess or deficit   Monitor intake, output and patient weight     Problem: Nutrition Deficit:  Goal: Optimize nutritional status  3/9/2023 1920 by Nena Gomez RN  Outcome: Completed  3/9/2023 1231 by Nena Gomez RN  Outcome: Progressing     Problem: Skin/Tissue Integrity  Goal: Absence of new skin breakdown  Description: 1. Monitor for areas of redness and/or skin breakdown  2. Assess vascular access sites hourly  3. Every 4-6 hours minimum:  Change oxygen saturation probe site  4. Every 4-6 hours:  If on nasal continuous positive airway pressure, respiratory therapy assess nares and determine need for appliance change or resting period.   3/9/2023 1920 by Enrico Rogel RN  Outcome: Completed  3/9/2023 1231 by Enrico Rogel RN  Outcome: Progressing     Problem: Pain  Goal: Verbalizes/displays adequate comfort level or baseline comfort level  3/9/2023 1920 by Enrico Rogel RN  Outcome: Completed  3/9/2023 1231 by Enrico Rogel RN  Outcome: Progressing  Flowsheets (Taken 3/9/2023 0830 by Caridad Vance RN)  Verbalizes/displays adequate comfort level or baseline comfort level:   Encourage patient to monitor pain and request assistance   Assess pain using appropriate pain scale

## 2023-03-10 NOTE — PROGRESS NOTES
Inserted obrien catheter 16fr/10ml for end of life comfort. Pt. Tolerated well. No urine return, notified oncoming nurse and hospice nurse of no urine output at this time and to monitor for correct placement. Oncoming nurse and hospice verbalized understanding.

## 2023-03-10 NOTE — PROGRESS NOTES
Pt d/c's with transport. All belongings were given to  at bedside. Transport updated on pt's status. Pt's VSS.